# Patient Record
Sex: FEMALE | Race: WHITE | NOT HISPANIC OR LATINO | Employment: FULL TIME | URBAN - METROPOLITAN AREA
[De-identification: names, ages, dates, MRNs, and addresses within clinical notes are randomized per-mention and may not be internally consistent; named-entity substitution may affect disease eponyms.]

---

## 2019-08-12 RX ORDER — ALBUTEROL SULFATE 2.5 MG/3ML
2.5 SOLUTION RESPIRATORY (INHALATION) EVERY 4 HOURS PRN
COMMUNITY
Start: 2018-03-13

## 2019-08-12 RX ORDER — DICYCLOMINE HCL 20 MG
20 TABLET ORAL EVERY 6 HOURS
COMMUNITY
Start: 2019-02-19 | End: 2019-10-07 | Stop reason: SDUPTHER

## 2019-08-12 RX ORDER — LORATADINE 10 MG/1
10 CAPSULE, LIQUID FILLED ORAL
COMMUNITY
End: 2019-11-01

## 2019-08-12 RX ORDER — LACTOBACILLUS RHAMNOSUS GG 10B CELL
1 CAPSULE ORAL DAILY
Status: ON HOLD | COMMUNITY
Start: 2019-07-19 | End: 2019-11-12

## 2019-08-12 RX ORDER — FLUOXETINE 10 MG/1
CAPSULE ORAL
Refills: 0 | COMMUNITY
Start: 2019-06-24 | End: 2019-11-01

## 2019-08-12 RX ORDER — DOCUSATE SODIUM 100 MG/1
100 CAPSULE, LIQUID FILLED ORAL DAILY
Refills: 0 | Status: ON HOLD | COMMUNITY
Start: 2019-07-11 | End: 2019-11-12

## 2019-08-12 RX ORDER — OXYCODONE HYDROCHLORIDE AND ACETAMINOPHEN 5; 325 MG/1; MG/1
1 TABLET ORAL
COMMUNITY
Start: 2019-08-08 | End: 2019-11-01

## 2019-08-15 ENCOUNTER — OFFICE VISIT (OUTPATIENT)
Dept: GASTROENTEROLOGY | Facility: CLINIC | Age: 34
End: 2019-08-15
Payer: COMMERCIAL

## 2019-08-15 VITALS
WEIGHT: 143.8 LBS | HEART RATE: 78 BPM | BODY MASS INDEX: 26.46 KG/M2 | HEIGHT: 62 IN | DIASTOLIC BLOOD PRESSURE: 68 MMHG | SYSTOLIC BLOOD PRESSURE: 116 MMHG

## 2019-08-15 DIAGNOSIS — K64.9 HEMORRHOIDS, UNSPECIFIED HEMORRHOID TYPE: ICD-10-CM

## 2019-08-15 DIAGNOSIS — K58.0 IRRITABLE BOWEL SYNDROME WITH DIARRHEA: Primary | ICD-10-CM

## 2019-08-15 PROCEDURE — 99203 OFFICE O/P NEW LOW 30 MIN: CPT | Performed by: PHYSICIAN ASSISTANT

## 2019-08-15 RX ORDER — FLUOXETINE HYDROCHLORIDE 20 MG/1
20 CAPSULE ORAL DAILY
Refills: 0 | COMMUNITY
Start: 2019-07-25 | End: 2020-10-16

## 2019-08-15 RX ORDER — SERTRALINE HYDROCHLORIDE 100 MG/1
TABLET, FILM COATED ORAL
COMMUNITY
Start: 2019-05-22 | End: 2019-11-01

## 2019-08-15 RX ORDER — IBUPROFEN 600 MG/1
TABLET ORAL
COMMUNITY
Start: 2019-08-01 | End: 2019-11-01

## 2019-08-15 RX ORDER — LIDOCAINE 40 MG/G
CREAM TOPICAL AS NEEDED
Qty: 30 G | Refills: 0 | Status: SHIPPED | OUTPATIENT
Start: 2019-08-15 | End: 2019-11-01

## 2019-08-15 NOTE — PROGRESS NOTES
[facility] - Outpatient Follow-up Note  Glenroy Robertson 29 y o  female MRN: [id number]  Encounter: [id number]          ASSESSMENT AND PLAN:      1  Irritable bowel syndrome with diarrhea  He she has a long history of IBS D  Recently she has suffered with severe hemorrhoids and is status post hemorrhoidectomy although she reports she is having significant pain postoperatively and has been unable to see the surgeon before August 28th  Anal exam shows an external hemorrhoid - start lidocaine and Anusol cream  Cannot use any antidiarrheals as she is status post hemorrhoidectomy but will start a trial of Xifaxan  Donnatal as needed for pain    She will need EGD and a colonoscopy however will have to wait 6 weeks postoperatively    2  Hemorrhoids, unspecified hemorrhoid type  Follow-up with surgeon  ______________________________________________________________________    SUBJECTIVE:  17-year-old female with irritable bowel syndrome complicated by internal and external hemorrhoids presents for evaluation  She reports that she has had bowel troubles for most of her life  She tends towards diarrhea  She has 4-5 loose stools daily associated with abdominal cramping and bloating  There is no rectal bleeding or unexpected weight loss  She has never had a full evaluation for her symptoms in the past   She had a hemorrhoidectomy on August 1st and is reporting significant pain postoperatively  She has tried to contact the office several times and was given an appointment for August 28  REVIEW OF SYSTEMS IS OTHERWISE NEGATIVE        Historical Information   Past Medical History:   Diagnosis Date    Irritable bowel syndrome      Past Surgical History:   Procedure Laterality Date    HEMORRHOID SURGERY       Social History   Social History     Substance and Sexual Activity   Alcohol Use Not Currently     Social History     Substance and Sexual Activity   Drug Use Yes    Types: Marijuana Comment: medical     Social History     Tobacco Use   Smoking Status Former Smoker   Smokeless Tobacco Current User     Family History   Problem Relation Age of Onset    Cancer Mother     Diabetes Father     Heart disease Father        Meds/Allergies       Current Outpatient Medications:     docusate sodium (COLACE) 100 mg capsule    FLUoxetine (PROzac) 10 mg capsule    FLUoxetine (PROzac) 20 mg capsule    ibuprofen (MOTRIN) 600 mg tablet    Lactobacillus-Inulin ([facility]) CAPS    Loratadine (CLARITIN) 10 MG CAPS    PROCTOZONE-HC 2 5 % rectal cream    sertraline (ZOLOFT) 50 mg tablet    albuterol (2 5 mg/3 mL) 0 083 % nebulizer solution    dicyclomine (BENTYL) 20 mg tablet    hydrocortisone 2 5 % cream    lidocaine (LMX) 4 % cream    oxyCODONE-acetaminophen (PERCOCET) 5-325 mg per tablet    rifaximin (XIFAXAN) 550 mg tablet    sertraline (ZOLOFT) 100 mg tablet    Allergies   Allergen Reactions    Peanut-Containing Drug Products      GI distress           Objective     Blood pressure 116/68, pulse 78, height 5' 2" (1 575 m), weight 65 2 kg (143 lb 12 8 oz)  Body mass index is 26 3 kg/m²  PHYSICAL EXAM:      General Appearance:   Alert, cooperative, no distress   HEENT:   Normocephalic, atraumatic, anicteric      Neck:  Supple, symmetrical, trachea midline   Lungs:   Clear to auscultation bilaterally; no rales, rhonchi or wheezing; respirations unlabored    Heart[de-identified]   Regular rate and rhythm; no murmur, rub, or gallop  Abdomen:   Soft, non-tender, non-distended; normal bowel sounds; no masses, no organomegaly    Genitalia:   Deferred    Rectal:   Deferred    Extremities:  No cyanosis, clubbing or edema    Pulses:  2+ and symmetric    Skin:  No jaundice, rashes, or lesions    Lymph nodes:  No palpable cervical lymphadenopathy        Lab Results:   No visits with results within 1 Day(s) from this visit     Latest known visit with results is:   No results found for any previous visit  Radiology Results:   No results found

## 2019-08-16 ENCOUNTER — TELEPHONE (OUTPATIENT)
Dept: GASTROENTEROLOGY | Facility: CLINIC | Age: 34
End: 2019-08-16

## 2019-09-06 ENCOUNTER — OFFICE VISIT (OUTPATIENT)
Dept: GASTROENTEROLOGY | Facility: CLINIC | Age: 34
End: 2019-09-06
Payer: COMMERCIAL

## 2019-09-06 VITALS
HEIGHT: 62 IN | BODY MASS INDEX: 25.69 KG/M2 | DIASTOLIC BLOOD PRESSURE: 82 MMHG | HEART RATE: 80 BPM | WEIGHT: 139.6 LBS | SYSTOLIC BLOOD PRESSURE: 102 MMHG

## 2019-09-06 DIAGNOSIS — A09 DIARRHEA OF INFECTIOUS ORIGIN: Primary | ICD-10-CM

## 2019-09-06 PROCEDURE — 99213 OFFICE O/P EST LOW 20 MIN: CPT | Performed by: PHYSICIAN ASSISTANT

## 2019-09-06 RX ORDER — HYDROXYZINE HYDROCHLORIDE 25 MG/1
25 TABLET, FILM COATED ORAL EVERY 6 HOURS PRN
COMMUNITY
End: 2020-07-20 | Stop reason: ALTCHOICE

## 2019-09-06 RX ORDER — ALOSETRON HYDROCHLORIDE 1 MG/1
1 TABLET, FILM COATED ORAL DAILY
Qty: 30 TABLET | Refills: 3 | Status: SHIPPED | OUTPATIENT
Start: 2019-09-06 | End: 2019-11-01

## 2019-09-06 NOTE — PROGRESS NOTES
[facility] - Outpatient Follow-up Note  Ac Payne 29 y o  female MRN: [id number]  Encounter: [id number]          ASSESSMENT AND PLAN:      1  Diarrhea of infectious origin  No relief with Xifaxan  Her anal pain has improved significantly  She still reports abdominal cramping  Will start Lotronex 0 5mg daily  ______________________________________________________________________    SUBJECTIVE:  59-year-old female with diarrhea predominant irritable bowel syndrome presents for follow-up  Unfortunately, she experienced no relief in her symptoms with the Xifaxan  She is she admits still taking her stool softener  She reports that her anal pain is significantly improved since last visit  She did follow-up with the surgeon who told her that what appeared to be a residual hemorrhoid was just a skin tag  She admits to associated abdominal cramping but denies any bleeding  She has had these symptoms for most of her adult life  REVIEW OF SYSTEMS IS OTHERWISE NEGATIVE        Historical Information   Past Medical History:   Diagnosis Date    Irritable bowel syndrome      Past Surgical History:   Procedure Laterality Date    HEMORRHOID SURGERY       Social History   Social History     Substance and Sexual Activity   Alcohol Use Not Currently     Social History     Substance and Sexual Activity   Drug Use Yes    Types: Marijuana    Comment: medical     Social History     Tobacco Use   Smoking Status Former Smoker   Smokeless Tobacco Current User     Family History   Problem Relation Age of Onset    Cancer Mother     Diabetes Father     Heart disease Father        Meds/Allergies       Current Outpatient Medications:     albuterol (2 5 mg/3 mL) 0 083 % nebulizer solution    docusate sodium (COLACE) 100 mg capsule    FLUoxetine (PROzac) 10 mg capsule    FLUoxetine (PROzac) 20 mg capsule    hydrocortisone 2 5 % cream    hydrOXYzine HCL (ATARAX) 25 mg tablet   ibuprofen (MOTRIN) 600 mg tablet    Lactobacillus-Inulin ([address]) CAPS    lidocaine (LMX) 4 % cream    Loratadine (CLARITIN) 10 MG CAPS    PROCTOZONE-HC 2 5 % rectal cream    sertraline (ZOLOFT) 100 mg tablet    alosetron (LOTRONEX) 1 MG tablet    dicyclomine (BENTYL) 20 mg tablet    oxyCODONE-acetaminophen (PERCOCET) 5-325 mg per tablet    sertraline (ZOLOFT) 50 mg tablet    Allergies   Allergen Reactions    Nuts      GI distress    Peanut-Containing Drug Products      GI distress           Objective     Blood pressure 102/82, pulse 80, height 5' 2" (1 575 m), weight 63 3 kg (139 lb 9 6 oz)  Body mass index is 25 53 kg/m²  PHYSICAL EXAM:      General Appearance:   Alert, cooperative, no distress   HEENT:   Normocephalic, atraumatic, anicteric      Neck:  Supple, symmetrical, trachea midline   Lungs:   Clear to auscultation bilaterally; no rales, rhonchi or wheezing; respirations unlabored    Heart[de-identified]   Regular rate and rhythm; no murmur, rub, or gallop  Abdomen:   Soft, non-tender, non-distended; normal bowel sounds; no masses, no organomegaly    Genitalia:   Deferred    Rectal:   Deferred    Extremities:  No cyanosis, clubbing or edema    Pulses:  2+ and symmetric    Skin:  No jaundice, rashes, or lesions    Lymph nodes:  No palpable cervical lymphadenopathy        Lab Results:   No visits with results within 1 Day(s) from this visit  Latest known visit with results is:   No results found for any previous visit  Radiology Results:   No results found

## 2019-09-10 ENCOUNTER — TELEPHONE (OUTPATIENT)
Dept: GASTROENTEROLOGY | Facility: CLINIC | Age: 34
End: 2019-09-10

## 2019-09-10 NOTE — TELEPHONE ENCOUNTER
Tried on covermymeds and [employer] is not the PA processor for this patient evne though that is the infomration from [facility]

## 2019-09-10 NOTE — TELEPHONE ENCOUNTER
rcvd fax from [address]     Alosetron 1mg tab daily needs prior auth      Will follow up with with prior auth

## 2019-09-12 ENCOUNTER — APPOINTMENT (EMERGENCY)
Dept: RADIOLOGY | Facility: HOSPITAL | Age: 34
End: 2019-09-12
Payer: COMMERCIAL

## 2019-09-12 ENCOUNTER — HOSPITAL ENCOUNTER (EMERGENCY)
Facility: HOSPITAL | Age: 34
Discharge: HOME | End: 2019-09-12
Admitting: EMERGENCY MEDICINE
Payer: COMMERCIAL

## 2019-09-12 VITALS
TEMPERATURE: 98.4 F | RESPIRATION RATE: 12 BRPM | HEART RATE: 100 BPM | OXYGEN SATURATION: 98 % | DIASTOLIC BLOOD PRESSURE: 80 MMHG | WEIGHT: 150.13 LBS | SYSTOLIC BLOOD PRESSURE: 126 MMHG

## 2019-09-12 DIAGNOSIS — M54.2 MUSCULOSKELETAL NECK PAIN: Primary | ICD-10-CM

## 2019-09-12 PROBLEM — S06.0X0A CONCUSSION WITH NO LOSS OF CONSCIOUSNESS: Status: ACTIVE | Noted: 2019-09-12
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
## 2019-09-12 LAB
AMPHETAMINES SERPL QL SCN: NEGATIVE
ATRIAL RATE: 84 BPM
BARBITURATES UR QL: NEGATIVE
BASE EXCESS BLDA CALC-SCNC: -3 MMOL/L (ref -2–3)
BENZODIAZ UR QL: NEGATIVE
CA-I BLD-SCNC: 1.16 MMOL/L (ref 1.12–1.32)
COCAINE UR QL: NEGATIVE
GLUCOSE SERPL-MCNC: 161 MG/DL (ref 65–140)
HCO3 BLDA-SCNC: 20.9 MMOL/L (ref 24–30)
HCT VFR BLD CALC: 37 % (ref 34.8–46.1)
HGB BLDA-MCNC: 12.6 G/DL (ref 11.5–15.4)
METHADONE UR QL: NEGATIVE
OPIATES UR QL SCN: NEGATIVE
P AXIS: 63 DEGREES
PCO2 BLD: 22 MMOL/L (ref 21–32)
PCO2 BLD: 33.4 MM HG (ref 42–50)
PCP UR QL: NEGATIVE
PH BLD: 7.4 [PH] (ref 7.3–7.4)
PO2 BLD: 27 MM HG (ref 35–45)
POTASSIUM BLD-SCNC: 3.5 MMOL/L (ref 3.5–5.3)
PR INTERVAL: 156 MS
QRS AXIS: 54 DEGREES
QRSD INTERVAL: 66 MS
QT INTERVAL: 350 MS
QTC INTERVAL: 413 MS
SAO2 % BLD FROM PO2: 52 % (ref 95–98)
SODIUM BLD-SCNC: 140 MMOL/L (ref 136–145)
SPECIMEN SOURCE: ABNORMAL
T WAVE AXIS: 52 DEGREES
THC UR QL: POSITIVE
VENTRICULAR RATE: 84 BPM

## 2019-09-12 PROCEDURE — 84132 ASSAY OF SERUM POTASSIUM: CPT

## 2019-09-12 PROCEDURE — 82330 ASSAY OF CALCIUM: CPT

## 2019-09-12 PROCEDURE — 71045 X-RAY EXAM CHEST 1 VIEW: CPT

## 2019-09-12 PROCEDURE — 72125 CT NECK SPINE W/O DYE: CPT

## 2019-09-12 PROCEDURE — 80307 DRUG TEST PRSMV CHEM ANLYZR: CPT | Performed by: PHYSICIAN ASSISTANT

## 2019-09-12 PROCEDURE — 85014 HEMATOCRIT: CPT

## 2019-09-12 PROCEDURE — 70450 CT HEAD/BRAIN W/O DYE: CPT

## 2019-09-12 PROCEDURE — 93005 ELECTROCARDIOGRAM TRACING: CPT

## 2019-09-12 PROCEDURE — 99282 EMERGENCY DEPT VISIT SF MDM: CPT | Performed by: SURGERY

## 2019-09-12 PROCEDURE — 93010 ELECTROCARDIOGRAM REPORT: CPT | Performed by: INTERNAL MEDICINE

## 2019-09-12 PROCEDURE — 84295 ASSAY OF SERUM SODIUM: CPT

## 2019-09-12 PROCEDURE — 99285 EMERGENCY DEPT VISIT HI MDM: CPT

## 2019-09-12 PROCEDURE — 82947 ASSAY GLUCOSE BLOOD QUANT: CPT

## 2019-09-12 PROCEDURE — 82803 BLOOD GASES ANY COMBINATION: CPT

## 2019-09-12 PROCEDURE — NC001 PR NO CHARGE: Performed by: EMERGENCY MEDICINE

## 2019-09-12 RX ORDER — GABAPENTIN 100 MG/1
100 CAPSULE ORAL 3 TIMES DAILY
Qty: 30 CAPSULE | Refills: 0 | Status: SHIPPED | OUTPATIENT
Start: 2019-09-12 | End: 2019-11-01

## 2019-09-12 RX ORDER — FLUOXETINE 10 MG/1
10 CAPSULE ORAL DAILY
COMMUNITY
End: 2019-11-01

## 2019-09-12 RX ORDER — METHOCARBAMOL 500 MG/1
500 TABLET, FILM COATED ORAL EVERY 8 HOURS PRN
Qty: 15 TABLET | Refills: 0 | Status: SHIPPED | OUTPATIENT
Start: 2019-09-12 | End: 2020-06-04

## 2019-09-12 RX ORDER — IBUPROFEN 600 MG/1
600 TABLET ORAL ONCE
Status: COMPLETED | OUTPATIENT
Start: 2019-09-12 | End: 2019-09-12

## 2019-09-12 RX ORDER — DIPHENOXYLATE HYDROCHLORIDE AND ATROPINE SULFATE 2.5; .025 MG/1; MG/1
1 TABLET ORAL DAILY
Status: ON HOLD | COMMUNITY
End: 2019-11-12

## 2019-09-12 RX ADMIN — IBUPROFEN 600 MG: 600 TABLET, FILM COATED ORAL at 14:05

## 2019-09-12 NOTE — QUICK NOTE
The patient had a CT scan of the cervical spine demonstrating no acute fractures or traumatic malalignment  On exam, the patient had no midline cervical spine tenderness  The patient had full range of motion (was then able to flex, extend, and rotate head side to side) without pain  There were no distracting injuries and the patient was not intoxicated  The patients cervical collar was cleared radiologically and clinically      Ovidio Moe PA-C  9/12/2019  12:21 PM
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
## 2019-09-12 NOTE — DISCHARGE INSTRUCTIONS
Acute Neck Pain   WHAT YOU NEED TO KNOW:   Acute neck pain starts suddenly, increases quickly, and goes away in a few days  The pain may come and go, or be worse with certain movements  The pain may be only in your neck, or it may move to your arms, back, or shoulders  You may also have pain that starts in another body area and moves to your neck  DISCHARGE INSTRUCTIONS:   Return to the emergency department if:   · You have an injury that causes neck pain and shooting pain down your arms or legs  · Your neck pain suddenly becomes severe  · You have neck pain along with numbness, tingling, or weakness in your arms or legs  · You have a stiff neck, a headache, and a fever  Contact your healthcare provider if:   · You have new or worsening symptoms  · Your symptoms continue even after treatment  · You have questions or concerns about your condition or care  Medicines:   · NSAIDs , such as ibuprofen, help decrease swelling, pain, and fever  This medicine is available without a doctor's order  Ask your healthcare provider which medicine to take and how often to take it  Follow directions  NSAIDs can cause stomach bleeding or kidney problems if not taken correctly  If you take blood thinner medicine, always ask if NSAIDs are safe for you  · Acetaminophen  helps decrease pain and fever  Ask your healthcare provider how much to take and how often to take it  Follow directions  Acetaminophen can cause liver damage if not taken correctly  · Steroid medicine  may be used to reduce inflammation  This can help relieve pain caused by swelling  · Take your medicine as directed  Contact your healthcare provider if you think your medicine is not helping or if you have side effects  Tell him or her if you are allergic to any medicine  Keep a list of the medicines, vitamins, and herbs you take  Include the amounts, and when and why you take them  Bring the list or the pill bottles to follow-up visits  Carry your medicine list with you in case of an emergency  Manage or prevent acute neck pain:   · Rest your neck as directed  Do not make sudden movements, such as turning your head quickly  Your healthcare provider may recommend you wear a cervical collar for a short time  The collar will prevent you from moving your head  This will give your neck time to heal if an injury is causing your neck pain  Ask your healthcare provider when you can return to sports or other normal daily activities  · Apply heat as directed  Heat helps relieve pain and swelling  Use a heat wrap, or soak a small towel in warm water  Wring out the extra water  Apply the heat wrap or towel for 20 minutes every hour, or as directed  · Apply ice as directed  Ice helps relieve pain and swelling, and can help prevent tissue damage  Use an ice pack, or put ice in a bag  Cover the ice pack or back with a towel before you apply it to your neck  Apply the ice pack or ice for 15 minutes every hour, or as directed  Your healthcare provider can tell you how often to apply ice  · Do neck exercises as directed  Neck exercises help strengthen the muscles and increase range of motion  Your healthcare provider will tell you which exercises are right for you  He may give you instructions, or he may recommend that you work with a physical therapist  Your healthcare provider or therapist can make sure you are doing the exercises correctly  · Maintain good posture  Try to keep your head and shoulders lifted when you sit  If you work in front of a computer, make sure the monitor is at the right level  You should not need to look up down to see the screen  You should also not have to lean forward to be able to read what is on the screen  Make sure your keyboard, mouse, and other computer items are placed where you do not have to extend your shoulder to reach them  Get up often if you work in front of a computer or sit for long periods of time  Stretch or walk around to keep your neck muscles loose  Follow up with your healthcare provider as directed: Your healthcare provider may refer you to a specialist if your pain does not get better with treatment  Write down your questions so you remember to ask them during your visits  © 2017 [address] Information is for End User's use only and may not be sold, redistributed or otherwise used for commercial purposes  All illustrations and images included in CareNotes® are the copyrighted property of A D A M , Inc  or [employer]  The above information is an  only  It is not intended as medical advice for individual conditions or treatments  Talk to your doctor, nurse or pharmacist before following any medical regimen to see if it is safe and effective for you
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
## 2019-09-24 ENCOUNTER — TELEPHONE (OUTPATIENT)
Dept: GASTROENTEROLOGY | Facility: CLINIC | Age: 34
End: 2019-09-24

## 2019-09-24 NOTE — TELEPHONE ENCOUNTER
[employer] and and lotronix was approved on 9-6-2019 until 2099  Spoke to Martir Brumfield and pt picked up meds on 9-

## 2019-09-24 NOTE — TELEPHONE ENCOUNTER
rcvd fax from [facility] 99 and auth on file for member and drug and no further review required at this time

## 2019-10-02 ENCOUNTER — PREP FOR PROCEDURE (OUTPATIENT)
Dept: GASTROENTEROLOGY | Facility: CLINIC | Age: 34
End: 2019-10-02

## 2019-10-02 ENCOUNTER — OFFICE VISIT (OUTPATIENT)
Dept: GASTROENTEROLOGY | Facility: CLINIC | Age: 34
End: 2019-10-02
Payer: COMMERCIAL

## 2019-10-02 DIAGNOSIS — R19.7 DIARRHEA, UNSPECIFIED TYPE: Primary | ICD-10-CM

## 2019-10-02 DIAGNOSIS — R10.84 GENERALIZED ABDOMINAL PAIN: ICD-10-CM

## 2019-10-02 PROCEDURE — 99214 OFFICE O/P EST MOD 30 MIN: CPT | Performed by: PHYSICIAN ASSISTANT

## 2019-10-02 NOTE — PATIENT INSTRUCTIONS
Nutrition Tips for Relief of Diarrhea   WHAT YOU NEED TO KNOW:   There are diet changes you can make to help relieve or stop diarrhea  These changes include limiting or avoiding foods and liquids that are high in sugar, fat, fiber, and lactose  Lactose is a sugar found in milk products  Milk products can cause diarrhea in people who are lactose intolerant  You should also drink extra liquids to replace fluids that are lost when you have diarrhea  Diarrhea can lead to dehydration  DISCHARGE INSTRUCTIONS:   Foods to limit or avoid:   · Dairy:      ¨ Whole milk    ¨ Half-and-half, cream, and sour cream    ¨ Regular (whole milk) ice cream    · Grains:      ¨ Whole wheat and whole grain breads, pasta, cereals, and crackers    ¨ Brown and wild rice    ¨ Breads and cereals with seeds or nuts    ¨ Popcorn    · Fruit and vegetables:      ¨ All raw fruits, except bananas and melon    ¨ Dried fruits, including prunes and raisins    ¨ Canned fruit in heavy syrup    ¨ Prune juice and any fruit juice with pulp    ¨ Raw vegetables, except lettuce     ¨ Fried vegetables    ¨ Corn, raw and cooked broccoli, cabbage, cauliflower, and silvia greens    · Protein:      ¨ Fried meat, poultry, and fish    ¨ High-fat luncheon meats, such as bologna    ¨ Fatty meats, such as sausage, kaba, and hot dogs    ¨ Beans and nuts    · Liquids:      ¨ Sodas and fruit-flavored drinks    ¨ Drinks that contain caffeine, such as energy drinks, coffee, and tea     ¨ Drinks that contain alcohol or sugar alcohol, such as sorbitol  Foods and liquids you may eat or drink:  Most people can tolerate the foods and liquids listed below  If any of them make your symptoms worse, stop eating or drinking them until you feel better  If you are lactose intolerant, avoid milk products    · Dairy:      ¨ Skim or low-fat milk or evaporated milk    ¨ Soy milk or buttermilk     ¨ Low-fat, part-skim, and aged cheese    ¨ Yogurt, low-fat ice cream, or sherbert    · Grains: (Choose foods with less than 2 grams of dietary fiber per serving )     ¨ White or refined flour breads, bagels, pasta, and crackers    ¨ Cold or hot cereals made from white or refined flour such as puffed rice, cornflakes, or cream of wheat    ¨ White rice    · Fruit and vegetables:      ¨ Bananas or melon    ¨ Fruit juice without pulp, except prune juice    ¨ Canned fruit in juice or light syrup    ¨ Lettuce and most well-cooked vegetables without seeds or skins     ¨ Strained vegetable juice    · Protein:      ¨ Tender, well-cooked meat, poultry, or fish    ¨ Well-cooked eggs or soy foods (cooked without added fat)    ¨ Smooth nut butters    · Fats:  (Limit fats to less than 8 teaspoons a day)     ¨ Oil, butter, or margarine, or mayonnaise    ¨ Cream cheese or salad dressings    · Liquids:      ¨ For infants, breast milk or formula    ¨ Oral rehydration solution     ¨ Decaffeinated coffee or caffeine-free teas    ¨ Soft drinks without caffeine  Other guidelines to follow:   · Drink liquids as directed  You may need to drink more liquids than usual to prevent dehydration  Ask how much liquid to drink each day and which liquids are best for you  You may need to drink an oral rehydration solution (ORS)  An ORS helps replace fluids and electrolytes that you lose when you have diarrhea  · Eat small meals or snacks every 3 to 4 hours  instead of large meals  Continue eating even if you still have diarrhea  Your diarrhea will continue for a few days but should gradually go away  © 2017 [address] [employer] Information is for End User's use only and may not be sold, redistributed or otherwise used for commercial purposes  All illustrations and images included in CareNotes® are the copyrighted property of [employer] , [employer]  or [employer]  The above information is an  only  It is not intended as medical advice for individual conditions or treatments   Talk to your doctor, nurse or pharmacist before following any medical regimen to see if it is safe and effective for you

## 2019-10-02 NOTE — LETTER
October 2, 2019     Crystal Khanna MD  [address] [facility] [place] [address]    Patient: Keaton Dang   YOB: 1985   Date of Visit: 10/2/2019       Dear Dr Antonina Gan: Thank you for referring Keaton Dang to me for evaluation  Below are my notes for this consultation  If you have questions, please do not hesitate to call me  I look forward to following your patient along with you  Sincerely,        Mattie Shah PA-C        CC: No Recipients  Mattie Shah PA-C  10/2/2019 10:20 AM  Sign at close encounter  [facility] - Outpatient Follow-up Note  Keaton Dang 29 y o  female MRN: [id number]  Encounter: [id number]          ASSESSMENT AND PLAN:      1  Diarrhea, unspecified type  2  Generalized abdominal pain  Will continue current medication regimen  I have asked patient to be taking Bentyl 20 mg t i d  As scheduled  Will do colonoscopy with biopsy to rule out microscopic colitis   ______________________________________________________________________    SUBJECTIVE:    79-year-old female who is here with abdominal pain and diarrhea  Patient reports her last appointment she was started on Lotronex and this seems to be helping her  She does report that sometimes she actually has to take a stool softener now because her stool is so hard  She does report that her rectal bleeding has stopped now that she has had her hemorrhoidal surgery  Patient reports that she has never had a colonoscopy at this time  Patient reports abdominal pain that is every day and is like a pulsating cramp  She reports occasional nausea and vomiting  She reports occasional diaphoresis PT  REVIEW OF SYSTEMS IS OTHERWISE NEGATIVE        Historical Information   Past Medical History:   Diagnosis Date    Irritable bowel syndrome      Past Surgical History:   Procedure Laterality Date    ABDOMINAL SURGERY      HEMORRHOID SURGERY       Social History   Social History Substance and Sexual Activity   Alcohol Use Not Currently     Social History     Substance and Sexual Activity   Drug Use Yes    Types: Marijuana    Comment: medical     Social History     Tobacco Use   Smoking Status Never Smoker   Smokeless Tobacco Never Used     Family History   Problem Relation Age of Onset    Cancer Mother     Diabetes Father     Heart disease Father        Meds/Allergies       Current Outpatient Medications:     albuterol (2 5 mg/3 mL) 0 083 % nebulizer solution    alosetron (LOTRONEX) 1 MG tablet    ARIPiprazole (ABILIFY) 5 mg tablet    dicyclomine (BENTYL) 20 mg tablet    docusate sodium (COLACE) 100 mg capsule    FLUoxetine (PROzac) 10 mg capsule    FLUoxetine (PROzac) 10 mg capsule    FLUoxetine (PROzac) 20 mg capsule    gabapentin (NEURONTIN) 100 mg capsule    hydrocortisone 2 5 % cream    hydrOXYzine HCL (ATARAX) 25 mg tablet    ibuprofen (MOTRIN) 600 mg tablet    Lactobacillus-Inulin ([facility] DIGESTIVE HEALTH) CAPS    lidocaine (LMX) 4 % cream    Loratadine (CLARITIN) 10 MG CAPS    methocarbamol (ROBAXIN) 500 mg tablet    multivitamin (THERAGRAN) TABS    oxyCODONE-acetaminophen (PERCOCET) 5-325 mg per tablet    PROCTOZONE-HC 2 5 % rectal cream    sertraline (ZOLOFT) 100 mg tablet    sertraline (ZOLOFT) 50 mg tablet    Allergies   Allergen Reactions    Nuts      GI distress    Peanut-Containing Drug Products      GI distress    Peanut-Containing Drug Products            Objective     There were no vitals taken for this visit  There is no height or weight on file to calculate BMI  PHYSICAL EXAM:      General Appearance:   Alert, cooperative, no distress   HEENT:   Normocephalic, atraumatic, anicteric      Neck:  Supple, symmetrical, trachea midline   Lungs:   Clear to auscultation bilaterally; no rales, rhonchi or wheezing; respirations unlabored    Heart[de-identified]   Regular rate and rhythm; no murmur, rub, or gallop     Abdomen:   Soft, non-tender, non-distended; normal bowel sounds; no masses, no organomegaly    Genitalia:   Deferred    Rectal:   Deferred    Extremities:  No cyanosis, clubbing or edema    Pulses:  2+ and symmetric    Skin:  No jaundice, rashes, or lesions    Lymph nodes:  No palpable cervical lymphadenopathy        Lab Results:   No visits with results within 1 Day(s) from this visit  Latest known visit with results is:   Admission on 09/12/2019, Discharged on 09/12/2019   Component Date Value    Amph/Meth UR 09/12/2019 Negative     Barbiturate Ur 09/12/2019 Negative     Benzodiazepine Urine 09/12/2019 Negative     Cocaine Urine 09/12/2019 Negative     Methadone Urine 09/12/2019 Negative     Opiate Urine 09/12/2019 Negative     PCP Ur 09/12/2019 Negative     THC Urine 09/12/2019 Positive*    ph, Wilson ISTAT 09/12/2019 7 404*    pCO2, Wilson i-STAT 09/12/2019 33 4*    pO2, Wilson i-STAT 09/12/2019 27 0*    BE, i-STAT 09/12/2019 -3*    HCO3, Wilson i-STAT 09/12/2019 20 9*    CO2, i-STAT 09/12/2019 22     O2 Sat, i-STAT 09/12/2019 52*    SODIUM, I-STAT 09/12/2019 140     Potassium, i-STAT 09/12/2019 3 5     Calcium, Ionized i-STAT 09/12/2019 1 16     Hct, i-STAT 09/12/2019 37     Hgb, i-STAT 09/12/2019 12 6     Glucose, i-STAT 09/12/2019 161*    Specimen Type 09/12/2019 VENOUS     Ventricular Rate 09/12/2019 84     Atrial Rate 09/12/2019 84     MO Interval 09/12/2019 156     QRSD Interval 09/12/2019 66     QT Interval 09/12/2019 350     QTC Interval 09/12/2019 413     P Axis 09/12/2019 63     QRS Axis 09/12/2019 54     T Wave Axis 09/12/2019 52          Radiology Results:   Ct Head Wo Contrast    Result Date: 9/12/2019  Narrative: CT BRAIN - WITHOUT CONTRAST INDICATION:   Head trauma, headache  COMPARISON:  None  TECHNIQUE:  CT examination of the brain was performed  In addition to axial images, coronal 2D reformatted images were created and submitted for interpretation    Radiation dose length product (DLP) for this visit: 966 93 mGy-cm   This examination, like all CT scans performed in the [facility], was performed utilizing techniques to minimize radiation dose exposure, including the use of iterative  reconstruction and automated exposure control  IMAGE QUALITY:  Diagnostic  FINDINGS: PARENCHYMA:  No intracranial mass, mass effect or midline shift  No CT signs of acute infarction  No acute parenchymal hemorrhage  VENTRICLES AND EXTRA-AXIAL SPACES:  Normal for the patient's age  VISUALIZED ORBITS AND PARANASAL SINUSES:  Unremarkable  CALVARIUM AND EXTRACRANIAL SOFT TISSUES:  Normal      Impression: No acute intracranial abnormality  I personally discussed this study with Christian Raza on 9/12/2019 at 11:18 AM  Workstation performed: [id number]     Ct Spine Cervical Wo Contrast    Result Date: 9/12/2019  Narrative: CT CERVICAL SPINE - WITHOUT CONTRAST INDICATION:   Polytrauma, critical, head/C-spine injury suspected  COMPARISON:  None  TECHNIQUE:  CT examination of the cervical spine was performed without intravenous contrast   Contiguous axial images were obtained  Sagittal and coronal reconstructions were performed  Radiation dose length product (DLP) for this visit:  289 79 mGy-cm   This examination, like all CT scans performed in the [facility], was performed utilizing techniques to minimize radiation dose exposure, including the use of iterative  reconstruction and automated exposure control  IMAGE QUALITY:  Diagnostic  FINDINGS: ALIGNMENT:  Normal alignment of the cervical spine  No subluxation  VERTEBRAL BODIES:  No fracture  DEGENERATIVE CHANGES:  No significant cervical degenerative changes are noted  PREVERTEBRAL AND PARASPINAL SOFT TISSUES:  Unremarkable  THORACIC INLET:  Normal      Impression: No cervical spine fracture or traumatic malalignment  Workstation performed: [id number]     Xr Trauma Multiple    Result Date: 9/12/2019  Narrative: CHEST INDICATION: Injury   COMPARISON: None VIEWS:  AP supine portable FINDINGS: Monitoring leads and clips project over the chest  The cardiomediastinal silhouette is within normal limits for technique and patient positioning  Lungs are clear  No pleural effusion  No pneumothorax is seen on this supine film  Upright imaging is more sensitive to detect anterior pneumothorax if relevant  No displaced fractures are evident  Impression: No acute cardiopulmonary disease within limitations of supine imaging    Workstation performed: [id number]

## 2019-10-02 NOTE — PROGRESS NOTES
[facility] - Outpatient Follow-up Note  Jeff Nails 29 y o  female MRN: [id number]  Encounter: [id number]          ASSESSMENT AND PLAN:      1  Diarrhea, unspecified type  2  Generalized abdominal pain  Will continue current medication regimen  I have asked patient to be taking Bentyl 20 mg t i d  As scheduled  Will do colonoscopy with biopsy to rule out microscopic colitis   ______________________________________________________________________    SUBJECTIVE:    49-year-old female who is here with abdominal pain and diarrhea  Patient reports her last appointment she was started on Lotronex and this seems to be helping her  She does report that sometimes she actually has to take a stool softener now because her stool is so hard  She does report that her rectal bleeding has stopped now that she has had her hemorrhoidal surgery  Patient reports that she has never had a colonoscopy at this time  Patient reports abdominal pain that is every day and is like a pulsating cramp  She reports occasional nausea and vomiting  She reports occasional diaphoresis PT  REVIEW OF SYSTEMS IS OTHERWISE NEGATIVE        Historical Information   Past Medical History:   Diagnosis Date    Irritable bowel syndrome      Past Surgical History:   Procedure Laterality Date    ABDOMINAL SURGERY      HEMORRHOID SURGERY       Social History   Social History     Substance and Sexual Activity   Alcohol Use Not Currently     Social History     Substance and Sexual Activity   Drug Use Yes    Types: Marijuana    Comment: medical     Social History     Tobacco Use   Smoking Status Never Smoker   Smokeless Tobacco Never Used     Family History   Problem Relation Age of Onset    Cancer Mother     Diabetes Father     Heart disease Father        Meds/Allergies       Current Outpatient Medications:     albuterol (2 5 mg/3 mL) 0 083 % nebulizer solution    alosetron (LOTRONEX) 1 MG tablet    ARIPiprazole (ABILIFY) 5 mg tablet    dicyclomine (BENTYL) 20 mg tablet    docusate sodium (COLACE) 100 mg capsule    FLUoxetine (PROzac) 10 mg capsule    FLUoxetine (PROzac) 10 mg capsule    FLUoxetine (PROzac) 20 mg capsule    gabapentin (NEURONTIN) 100 mg capsule    hydrocortisone 2 5 % cream    hydrOXYzine HCL (ATARAX) 25 mg tablet    ibuprofen (MOTRIN) 600 mg tablet    Lactobacillus-Inulin ([facility] DIGESTIVE HEALTH) CAPS    lidocaine (LMX) 4 % cream    Loratadine (CLARITIN) 10 MG CAPS    methocarbamol (ROBAXIN) 500 mg tablet    multivitamin (THERAGRAN) TABS    oxyCODONE-acetaminophen (PERCOCET) 5-325 mg per tablet    PROCTOZONE-HC 2 5 % rectal cream    sertraline (ZOLOFT) 100 mg tablet    sertraline (ZOLOFT) 50 mg tablet    Allergies   Allergen Reactions    Nuts      GI distress    Peanut-Containing Drug Products      GI distress    Peanut-Containing Drug Products            Objective     There were no vitals taken for this visit  There is no height or weight on file to calculate BMI  PHYSICAL EXAM:      General Appearance:   Alert, cooperative, no distress   HEENT:   Normocephalic, atraumatic, anicteric      Neck:  Supple, symmetrical, trachea midline   Lungs:   Clear to auscultation bilaterally; no rales, rhonchi or wheezing; respirations unlabored    Heart[de-identified]   Regular rate and rhythm; no murmur, rub, or gallop  Abdomen:   Soft, non-tender, non-distended; normal bowel sounds; no masses, no organomegaly    Genitalia:   Deferred    Rectal:   Deferred    Extremities:  No cyanosis, clubbing or edema    Pulses:  2+ and symmetric    Skin:  No jaundice, rashes, or lesions    Lymph nodes:  No palpable cervical lymphadenopathy        Lab Results:   No visits with results within 1 Day(s) from this visit     Latest known visit with results is:   Admission on 09/12/2019, Discharged on 09/12/2019   Component Date Value    Amph/Meth UR 09/12/2019 Negative     Barbiturate Ur 09/12/2019 Negative     Benzodiazepine Urine 09/12/2019 Negative     Cocaine Urine 09/12/2019 Negative     Methadone Urine 09/12/2019 Negative     Opiate Urine 09/12/2019 Negative     PCP Ur 09/12/2019 Negative     THC Urine 09/12/2019 Positive*    ph, Wilson ISTAT 09/12/2019 7 404*    pCO2, Wilson i-STAT 09/12/2019 33 4*    pO2, Wilson i-STAT 09/12/2019 27 0*    BE, i-STAT 09/12/2019 -3*    HCO3, Wilson i-STAT 09/12/2019 20 9*    CO2, i-STAT 09/12/2019 22     O2 Sat, i-STAT 09/12/2019 52*    SODIUM, I-STAT 09/12/2019 140     Potassium, i-STAT 09/12/2019 3 5     Calcium, Ionized i-STAT 09/12/2019 1 16     Hct, i-STAT 09/12/2019 37     Hgb, i-STAT 09/12/2019 12 6     Glucose, i-STAT 09/12/2019 161*    Specimen Type 09/12/2019 VENOUS     Ventricular Rate 09/12/2019 84     Atrial Rate 09/12/2019 84     [place] Interval 09/12/2019 156     QRSD Interval 09/12/2019 66     QT Interval 09/12/2019 350     QTC Interval 09/12/2019 413     P Axis 09/12/2019 63     QRS Axis 09/12/2019 54     T Wave Axis 09/12/2019 52          Radiology Results:   Ct Head Wo Contrast    Result Date: 9/12/2019  Narrative: CT BRAIN - WITHOUT CONTRAST INDICATION:   Head trauma, headache  COMPARISON:  None  TECHNIQUE:  CT examination of the brain was performed  In addition to axial images, coronal 2D reformatted images were created and submitted for interpretation  Radiation dose length product (DLP) for this visit:  966 93 mGy-cm   This examination, like all CT scans performed in the [facility], was performed utilizing techniques to minimize radiation dose exposure, including the use of iterative  reconstruction and automated exposure control  IMAGE QUALITY:  Diagnostic  FINDINGS: PARENCHYMA:  No intracranial mass, mass effect or midline shift  No CT signs of acute infarction  No acute parenchymal hemorrhage  VENTRICLES AND EXTRA-AXIAL SPACES:  Normal for the patient's age  VISUALIZED ORBITS AND PARANASAL SINUSES:  Unremarkable   CALVARIUM AND EXTRACRANIAL SOFT TISSUES:  Normal      Impression: No acute intracranial abnormality  I personally discussed this study with Román Lucio on 9/12/2019 at 11:18 AM  Workstation performed: [id number]     Ct Spine Cervical Wo Contrast    Result Date: 9/12/2019  Narrative: CT CERVICAL SPINE - WITHOUT CONTRAST INDICATION:   Polytrauma, critical, head/C-spine injury suspected  COMPARISON:  None  TECHNIQUE:  CT examination of the cervical spine was performed without intravenous contrast   Contiguous axial images were obtained  Sagittal and coronal reconstructions were performed  Radiation dose length product (DLP) for this visit:  289 79 mGy-cm   This examination, like all CT scans performed in the [facility], was performed utilizing techniques to minimize radiation dose exposure, including the use of iterative  reconstruction and automated exposure control  IMAGE QUALITY:  Diagnostic  FINDINGS: ALIGNMENT:  Normal alignment of the cervical spine  No subluxation  VERTEBRAL BODIES:  No fracture  DEGENERATIVE CHANGES:  No significant cervical degenerative changes are noted  PREVERTEBRAL AND PARASPINAL SOFT TISSUES:  Unremarkable  THORACIC INLET:  Normal      Impression: No cervical spine fracture or traumatic malalignment  Workstation performed: [id number]     Xr Trauma Multiple    Result Date: 9/12/2019  Narrative: CHEST INDICATION: Injury  COMPARISON: None VIEWS:  AP supine portable FINDINGS: Monitoring leads and clips project over the chest  The cardiomediastinal silhouette is within normal limits for technique and patient positioning  Lungs are clear  No pleural effusion  No pneumothorax is seen on this supine film  Upright imaging is more sensitive to detect anterior pneumothorax if relevant  No displaced fractures are evident  Impression: No acute cardiopulmonary disease within limitations of supine imaging    Workstation performed: [id number]

## 2019-10-04 ENCOUNTER — TREATMENT (OUTPATIENT)
Dept: GASTROENTEROLOGY | Facility: CLINIC | Age: 34
End: 2019-10-04

## 2019-10-04 ENCOUNTER — HOSPITAL ENCOUNTER (OUTPATIENT)
Dept: GASTROENTEROLOGY | Facility: HOSPITAL | Age: 34
Setting detail: OUTPATIENT SURGERY
Discharge: HOME | End: 2019-10-04
Attending: INTERNAL MEDICINE
Payer: COMMERCIAL

## 2019-10-04 ENCOUNTER — ANESTHESIA (OUTPATIENT)
Dept: GASTROENTEROLOGY | Facility: HOSPITAL | Age: 34
End: 2019-10-04

## 2019-10-04 ENCOUNTER — ANESTHESIA EVENT (OUTPATIENT)
Dept: GASTROENTEROLOGY | Facility: HOSPITAL | Age: 34
End: 2019-10-04

## 2019-10-04 ENCOUNTER — APPOINTMENT (OUTPATIENT)
Dept: LAB | Facility: HOSPITAL | Age: 34
End: 2019-10-04
Attending: INTERNAL MEDICINE
Payer: COMMERCIAL

## 2019-10-04 VITALS
RESPIRATION RATE: 18 BRPM | HEART RATE: 93 BPM | HEIGHT: 62 IN | OXYGEN SATURATION: 97 % | BODY MASS INDEX: 24.83 KG/M2 | SYSTOLIC BLOOD PRESSURE: 109 MMHG | TEMPERATURE: 97.5 F | WEIGHT: 134.92 LBS | DIASTOLIC BLOOD PRESSURE: 69 MMHG

## 2019-10-04 DIAGNOSIS — C18.3 MALIGNANT NEOPLASM OF HEPATIC FLEXURE (HCC): Primary | ICD-10-CM

## 2019-10-04 DIAGNOSIS — R19.7 DIARRHEA, UNSPECIFIED TYPE: ICD-10-CM

## 2019-10-04 DIAGNOSIS — C18.3 MALIGNANT NEOPLASM OF HEPATIC FLEXURE (HCC): ICD-10-CM

## 2019-10-04 LAB
CEA SERPL-MCNC: 3.4 NG/ML (ref 0–3)
EXT PREGNANCY TEST URINE: NEGATIVE
EXT. CONTROL: NORMAL

## 2019-10-04 PROCEDURE — 88305 TISSUE EXAM BY PATHOLOGIST: CPT | Performed by: PATHOLOGY

## 2019-10-04 PROCEDURE — 82378 CARCINOEMBRYONIC ANTIGEN: CPT

## 2019-10-04 PROCEDURE — 81025 URINE PREGNANCY TEST: CPT | Performed by: STUDENT IN AN ORGANIZED HEALTH CARE EDUCATION/TRAINING PROGRAM

## 2019-10-04 PROCEDURE — 45380 COLONOSCOPY AND BIOPSY: CPT | Performed by: INTERNAL MEDICINE

## 2019-10-04 PROCEDURE — 36415 COLL VENOUS BLD VENIPUNCTURE: CPT

## 2019-10-04 PROCEDURE — 45381 COLONOSCOPY SUBMUCOUS NJX: CPT | Performed by: INTERNAL MEDICINE

## 2019-10-04 RX ORDER — SODIUM CHLORIDE, SODIUM LACTATE, POTASSIUM CHLORIDE, CALCIUM CHLORIDE 600; 310; 30; 20 MG/100ML; MG/100ML; MG/100ML; MG/100ML
125 INJECTION, SOLUTION INTRAVENOUS CONTINUOUS
Status: DISCONTINUED | OUTPATIENT
Start: 2019-10-04 | End: 2019-10-08 | Stop reason: HOSPADM

## 2019-10-04 RX ORDER — LIDOCAINE HYDROCHLORIDE 10 MG/ML
INJECTION, SOLUTION EPIDURAL; INFILTRATION; INTRACAUDAL; PERINEURAL AS NEEDED
Status: DISCONTINUED | OUTPATIENT
Start: 2019-10-04 | End: 2019-10-04 | Stop reason: SURG

## 2019-10-04 RX ORDER — PROPOFOL 10 MG/ML
INJECTION, EMULSION INTRAVENOUS AS NEEDED
Status: DISCONTINUED | OUTPATIENT
Start: 2019-10-04 | End: 2019-10-04 | Stop reason: SURG

## 2019-10-04 RX ADMIN — PROPOFOL 80 MG: 10 INJECTION, EMULSION INTRAVENOUS at 13:31

## 2019-10-04 RX ADMIN — PROPOFOL 170 MG: 10 INJECTION, EMULSION INTRAVENOUS at 13:28

## 2019-10-04 RX ADMIN — PROPOFOL 100 MG: 10 INJECTION, EMULSION INTRAVENOUS at 13:37

## 2019-10-04 RX ADMIN — PROPOFOL 50 MG: 10 INJECTION, EMULSION INTRAVENOUS at 13:33

## 2019-10-04 RX ADMIN — PROPOFOL 50 MG: 10 INJECTION, EMULSION INTRAVENOUS at 13:35

## 2019-10-04 RX ADMIN — PROPOFOL 50 MG: 10 INJECTION, EMULSION INTRAVENOUS at 13:40

## 2019-10-04 RX ADMIN — SODIUM CHLORIDE, SODIUM LACTATE, POTASSIUM CHLORIDE, AND CALCIUM CHLORIDE: .6; .31; .03; .02 INJECTION, SOLUTION INTRAVENOUS at 13:15

## 2019-10-04 RX ADMIN — LIDOCAINE HYDROCHLORIDE 50 MG: 10 INJECTION, SOLUTION EPIDURAL; INFILTRATION; INTRACAUDAL; PERINEURAL at 13:26

## 2019-10-04 RX ADMIN — PROPOFOL 50 MG: 10 INJECTION, EMULSION INTRAVENOUS at 13:39

## 2019-10-04 NOTE — ANESTHESIA PREPROCEDURE EVALUATION
Review of Systems/Medical History  Patient summary reviewed  Chart reviewed  No history of anesthetic complications     Cardiovascular  Exercise tolerance (METS): >4,     Pulmonary       GI/Hepatic            Endo/Other     GYN       Hematology   Musculoskeletal       Neurology   Psychology   Anxiety, Depression , being treated for depression,              Physical Exam    Airway    Mallampati score: II  TM Distance: >3 FB  Neck ROM: full     Dental   No notable dental hx     Cardiovascular      Pulmonary      Other Findings        Anesthesia Plan  ASA Score- 2     Anesthesia Type- IV sedation with anesthesia with ASA Monitors  Additional Monitors:   Airway Plan:         Plan Factors-    Induction- intravenous  Postoperative Plan-     Informed Consent- Anesthetic plan and risks discussed with patient  I personally reviewed this patient with the CRNA  Discussed and agreed on the Anesthesia Plan with the CRNA  Jaun Gonzalez

## 2019-10-04 NOTE — DISCHARGE INSTRUCTIONS
Colonoscopy   WHAT YOU NEED TO KNOW:   A colonoscopy is a procedure to examine the inside of your colon (intestine) with a scope  Polyps or tissue growths may have been removed during your colonoscopy  It is normal to feel bloated and to have some abdominal discomfort  You should be passing gas  If you have hemorrhoids or you had polyps removed, you may have a small amount of bleeding  DISCHARGE INSTRUCTIONS:   Seek care immediately if:   · You have a large amount of bright red blood in your bowel movements  · Your abdomen is hard and firm and you have severe pain  · You have sudden trouble breathing  Contact your healthcare provider if:   · You develop a rash or hives  · You have a fever within 24 hours of your procedure  · You have not had a bowel movement for 3 days after your procedure  · You have questions or concerns about your condition or care  Activity:   · Do not lift, strain, or run  for 3 days after your procedure  · Rest after your procedure  You have been given medicine to relax you  Do not  drive or make important decisions until the day after your procedure  Return to your normal activity as directed  · Relieve gas and discomfort from bloating  by lying on your right side with a heating pad on your abdomen  You may need to take short walks to help the gas move out  Eat small meals until bloating is relieved  If you had polyps removed: For 7 days after your procedure:  · Do not  take aspirin  · Do not  go on long car rides  Help prevent constipation:   · Eat a variety of healthy foods  Healthy foods include fruit, vegetables, whole-grain breads, low-fat dairy products, beans, lean meat, and fish  Ask if you need to be on a special diet  Your healthcare provider may recommend that you eat high-fiber foods such as cooked beans  Fiber helps you have regular bowel movements  · Drink liquids as directed    Adults should drink between 9 and 13 eight-ounce cups of liquid every day  Ask what amount is best for you  For most people, good liquids to drink are water, juice, and milk  · Exercise as directed  Talk to your healthcare provider about the best exercise plan for you  Exercise can help prevent constipation, decrease your blood pressure and improve your health  Follow up with your healthcare provider as directed:  Write down your questions so you remember to ask them during your visits  © 2017 [employer] Information is for End User's use only and may not be sold, redistributed or otherwise used for commercial purposes  All illustrations and images included in CareNotes® are the copyrighted property of [employer]  or [employer]  The above information is an  only  It is not intended as medical advice for individual conditions or treatments  Talk to your doctor, nurse or pharmacist before following any medical regimen to see if it is safe and effective for you

## 2019-10-04 NOTE — H&P
History and Physical -  [facility]  Katy Betancur 29 y o  female MRN: [id number]      HPI: Katy Betancur is a 29y o  year old female who presents for generalized abdominal pain, chronic diarrhea      REVIEW OF SYSTEMS: Per the HPI, and otherwise unremarkable  Historical Information   Past Medical History:   Diagnosis Date    Irritable bowel syndrome      Past Surgical History:   Procedure Laterality Date    ABDOMINAL SURGERY      HEMORRHOID SURGERY       Social History   Social History     Substance and Sexual Activity   Alcohol Use Not Currently     Social History     Substance and Sexual Activity   Drug Use Yes    Types: Marijuana    Comment: medical     Social History     Tobacco Use   Smoking Status Never Smoker   Smokeless Tobacco Never Used     Family History   Problem Relation Age of Onset    Cancer Mother     Diabetes Father     Heart disease Father        Meds/Allergies       (Not in a hospital admission)    Allergies   Allergen Reactions    Nuts      GI distress    Peanut-Containing Drug Products      GI distress    Peanut-Containing Drug Products        Objective     There were no vitals taken for this visit  PHYSICAL EXAM    Gen: NAD  CV: RRR  CHEST: Clear  ABD: soft, NT/ND  EXT: no edema      ASSESSMENT/PLAN:  This is a 29y o  year old female here for colonoscopy with biopsies, and she is stable and optimized for her procedure

## 2019-10-07 DIAGNOSIS — R10.9 ABDOMINAL PAIN, UNSPECIFIED ABDOMINAL LOCATION: Primary | ICD-10-CM

## 2019-10-07 RX ORDER — DICYCLOMINE HCL 20 MG
20 TABLET ORAL EVERY 6 HOURS
Qty: 360 TABLET | Refills: 3 | Status: ON HOLD | OUTPATIENT
Start: 2019-10-07 | End: 2019-11-12

## 2019-10-07 NOTE — TELEPHONE ENCOUNTER
Dr Quin Guidry - Patient called lmom - refill of dicyclomine 20 mg tablet   1 Tablet every 6 hours   Please call [facility] at [phone] ty

## 2019-10-10 ENCOUNTER — TELEPHONE (OUTPATIENT)
Dept: GASTROENTEROLOGY | Facility: CLINIC | Age: 34
End: 2019-10-10

## 2019-10-10 DIAGNOSIS — C18.3 MALIGNANT NEOPLASM OF HEPATIC FLEXURE (HCC): Primary | ICD-10-CM

## 2019-10-10 NOTE — TELEPHONE ENCOUNTER
Called [facility] (Tawana Alonzo) and spoke to Scott she will speak to the doctor to find out when he can see her, usually in cases like this they will see pt within 1 week  Scott will contact pt tomorrow  Called pt and informed her of the above and provider her with Tawana Alonzo contacts info

## 2019-10-10 NOTE — TELEPHONE ENCOUNTER
----- Message from Paolo Vivas PA-C sent at 10/10/2019  2:05 PM EDT -----  Results given  Please have patient see Colorectal Surgery ASAP Thanks,  I will put referral in system

## 2019-10-10 NOTE — TELEPHONE ENCOUNTER
[facility] pt - Pt called to see what the next step is with her treatment, please call [phone]   Ty

## 2019-10-10 NOTE — TELEPHONE ENCOUNTER
----- Message from Emiliano Cannon PA-C sent at 10/10/2019  2:05 PM EDT -----  Results given  Please have patient see Colorectal Surgery ASAP Thanks,  I will put referral in system

## 2019-10-11 NOTE — TELEPHONE ENCOUNTER
Called pt and advised  Marisel Cordoba had already called and pt just had a call 20 min ago for appt with colorectal surgery

## 2019-10-16 PROBLEM — C18.3 CANCER OF HEPATIC FLEXURE (HCC): Status: ACTIVE | Noted: 2019-10-16

## 2019-10-22 ENCOUNTER — HOSPITAL ENCOUNTER (OUTPATIENT)
Dept: CT IMAGING | Facility: CLINIC | Age: 34
Discharge: HOME | End: 2019-10-22
Payer: COMMERCIAL

## 2019-10-22 DIAGNOSIS — C18.3 CANCER OF HEPATIC FLEXURE (HCC): ICD-10-CM

## 2019-10-22 PROCEDURE — 74177 CT ABD & PELVIS W/CONTRAST: CPT

## 2019-10-22 PROCEDURE — 71260 CT THORAX DX C+: CPT

## 2019-10-22 RX ADMIN — IOHEXOL 100 ML: 350 INJECTION, SOLUTION INTRAVENOUS at 12:47

## 2019-10-23 ENCOUNTER — TELEPHONE (OUTPATIENT)
Dept: SURGICAL ONCOLOGY | Facility: CLINIC | Age: 34
End: 2019-10-23

## 2019-10-23 NOTE — TELEPHONE ENCOUNTER
New Patient Encounter    New Patient Intake Form   Patient Details:  Lawanda Garcia  1985  [id number]    Background Information:  [address] starts by opening a telephone encounter and gathering the following information   Who is calling to schedule? If not self, relationship to patient? 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 10/2019   Is patient aware of diagnosis? Yes   Reason for visit? NP DX   Have you had any testing done? If so: when, where? Yes   Are records in [facility]? yes   Was the patient told to bring a disk? no   Scheduling Information:   Preferred [place]:  [place]     Requesting Specific Provider? ali   Are there any dates/time the patient cannot be seen? no   Counseling Pre-Screen:  If the patient answers YES to any of the below questions, please route to the appropriate location specific counselor    Have you felt anxious or worried about cancer and the treatment you are receiving? Did Not Speak to Patient   Has your diagnosis caused physical, emotional, or financial hardship for you? Did Not Speak to Patient   Note: Do not ask the patient about transportation issues/needs  Please notate if the patient brings it up and the counselor will schedule accordingly  Miscellaneous: n/a   After completing the above information, please route to Financial Counselor and the appropriate Nurse Navigator for review

## 2019-11-01 ENCOUNTER — APPOINTMENT (OUTPATIENT)
Dept: LAB | Facility: HOSPITAL | Age: 34
DRG: 231 | End: 2019-11-01
Attending: COLON & RECTAL SURGERY
Payer: COMMERCIAL

## 2019-11-01 ENCOUNTER — CONSULT (OUTPATIENT)
Dept: GYNECOLOGIC ONCOLOGY | Facility: CLINIC | Age: 34
End: 2019-11-01
Payer: COMMERCIAL

## 2019-11-01 DIAGNOSIS — C18.3 CANCER OF HEPATIC FLEXURE (HCC): ICD-10-CM

## 2019-11-01 DIAGNOSIS — Z13.79 VISIT FOR GENETIC SCREENING: ICD-10-CM

## 2019-11-01 DIAGNOSIS — Z85.038 PERSONAL HISTORY OF COLON CANCER: Primary | ICD-10-CM

## 2019-11-01 LAB
ABO GROUP BLD: NORMAL
ALBUMIN SERPL BCP-MCNC: 4 G/DL (ref 3.5–5)
ALP SERPL-CCNC: 69 U/L (ref 46–116)
ALT SERPL W P-5'-P-CCNC: 15 U/L (ref 12–78)
ANION GAP SERPL CALCULATED.3IONS-SCNC: 8 MMOL/L (ref 4–13)
AST SERPL W P-5'-P-CCNC: 10 U/L (ref 5–45)
BASOPHILS # BLD AUTO: 0.07 THOUSANDS/ΜL (ref 0–0.1)
BASOPHILS NFR BLD AUTO: 1 % (ref 0–1)
BILIRUB SERPL-MCNC: 0.14 MG/DL (ref 0.2–1)
BLD GP AB SCN SERPL QL: NEGATIVE
BUN SERPL-MCNC: 5 MG/DL (ref 5–25)
CALCIUM SERPL-MCNC: 9.2 MG/DL (ref 8.3–10.1)
CHLORIDE SERPL-SCNC: 109 MMOL/L (ref 100–108)
CO2 SERPL-SCNC: 25 MMOL/L (ref 21–32)
CREAT SERPL-MCNC: 0.67 MG/DL (ref 0.6–1.3)
EOSINOPHIL # BLD AUTO: 0.25 THOUSAND/ΜL (ref 0–0.61)
EOSINOPHIL NFR BLD AUTO: 3 % (ref 0–6)
ERYTHROCYTE [DISTWIDTH] IN BLOOD BY AUTOMATED COUNT: 13.6 % (ref 11.6–15.1)
EST. AVERAGE GLUCOSE BLD GHB EST-MCNC: 97 MG/DL
GFR SERPL CREATININE-BSD FRML MDRD: 115 ML/MIN/1.73SQ M
GLUCOSE SERPL-MCNC: 92 MG/DL (ref 65–140)
HBA1C MFR BLD: 5 % (ref 4.2–6.3)
HCT VFR BLD AUTO: 36.7 % (ref 34.8–46.1)
HGB BLD-MCNC: 11.8 G/DL (ref 11.5–15.4)
IMM GRANULOCYTES # BLD AUTO: 0.02 THOUSAND/UL (ref 0–0.2)
IMM GRANULOCYTES NFR BLD AUTO: 0 % (ref 0–2)
LYMPHOCYTES # BLD AUTO: 1.7 THOUSANDS/ΜL (ref 0.6–4.47)
LYMPHOCYTES NFR BLD AUTO: 21 % (ref 14–44)
MCH RBC QN AUTO: 28.7 PG (ref 26.8–34.3)
MCHC RBC AUTO-ENTMCNC: 32.2 G/DL (ref 31.4–37.4)
MCV RBC AUTO: 89 FL (ref 82–98)
MONOCYTES # BLD AUTO: 0.43 THOUSAND/ΜL (ref 0.17–1.22)
MONOCYTES NFR BLD AUTO: 5 % (ref 4–12)
NEUTROPHILS # BLD AUTO: 5.47 THOUSANDS/ΜL (ref 1.85–7.62)
NEUTS SEG NFR BLD AUTO: 70 % (ref 43–75)
NRBC BLD AUTO-RTO: 0 /100 WBCS
PLATELET # BLD AUTO: 289 THOUSANDS/UL (ref 149–390)
PMV BLD AUTO: 11.6 FL (ref 8.9–12.7)
POTASSIUM SERPL-SCNC: 4.5 MMOL/L (ref 3.5–5.3)
PROT SERPL-MCNC: 7.9 G/DL (ref 6.4–8.2)
RBC # BLD AUTO: 4.11 MILLION/UL (ref 3.81–5.12)
RH BLD: POSITIVE
SODIUM SERPL-SCNC: 142 MMOL/L (ref 136–145)
SPECIMEN EXPIRATION DATE: NORMAL
WBC # BLD AUTO: 7.94 THOUSAND/UL (ref 4.31–10.16)

## 2019-11-01 PROCEDURE — 80053 COMPREHEN METABOLIC PANEL: CPT

## 2019-11-01 PROCEDURE — 86850 RBC ANTIBODY SCREEN: CPT

## 2019-11-01 PROCEDURE — 83036 HEMOGLOBIN GLYCOSYLATED A1C: CPT

## 2019-11-01 PROCEDURE — 85025 COMPLETE CBC W/AUTO DIFF WBC: CPT

## 2019-11-01 PROCEDURE — 86901 BLOOD TYPING SEROLOGIC RH(D): CPT

## 2019-11-01 PROCEDURE — 99242 OFF/OP CONSLTJ NEW/EST SF 20: CPT | Performed by: PHYSICIAN ASSISTANT

## 2019-11-01 PROCEDURE — 36415 COLL VENOUS BLD VENIPUNCTURE: CPT | Performed by: PHYSICIAN ASSISTANT

## 2019-11-01 PROCEDURE — 86900 BLOOD TYPING SEROLOGIC ABO: CPT

## 2019-11-01 PROCEDURE — 36415 COLL VENOUS BLD VENIPUNCTURE: CPT

## 2019-11-01 NOTE — PROGRESS NOTES
Assessment/Plan:    Problem List Items Addressed This Visit        Other    Personal history of colon cancer - Primary     70-year-old female with a newly diagnosed right sided colon cancer  She has a family history of thyroid cancer and breast cancer  She qualifies for genetic testing per NCCN guidelines  We discussed risk and benefits of testing  She understands the possible outcomes of testing including no pathogenic mutation, a positive pathogenic mutation and VUS  We also discussed prophylactic procedures/screening in the event a genetic mutation identified, as well as implications for patient's family members if mutation identified  Patient agrees to proceed with testing  Venous blood sample collected and sent to [facility]  I will call the patient with results in 2-4 weeks  The patient will undergo formal genetic counseling and appropriate referrals will be made in the event of a positive finding  Other Visit Diagnoses     Visit for genetic screening          I have spent 30 minutes with patient today in which greater than 50% of this time was spent in counseling/coordination of care regarding genetic testing risks, benefits and potential outcomes  CHIEF COMPLAINT:   Genetic testing consultation      Problem:  Right sided colon cancer  Family history of thyroid and breast cancer    Previous therapy:  1  Pending surgical resection    Patient ID: Georges Smith is a 29 y o  female  Who presents to the office for genetic testing consultation  She was referred by Dr Mallory Walsh  The patient has a new diagnosis of right-sided colon cancer  She is scheduled for surgical resection  The patient denies a family history of uterine cancer  Her mother had thyroid cancer and maternal grandmother with breast cancer in her 45s  She is without acute complaints today        Review of Systems   Unable to perform ROS: Other       Current Outpatient Medications   Medication Sig Dispense Refill    albuterol (2 5 mg/3 mL) 0 083 % nebulizer solution 2 5 mg every 4 (four) hours      alosetron (LOTRONEX) 1 MG tablet Take 1 tablet (1 mg total) by mouth daily 30 tablet 3    ARIPiprazole (ABILIFY) 5 mg tablet Take 5 mg by mouth daily  0    dicyclomine (BENTYL) 20 mg tablet Take 1 tablet (20 mg total) by mouth every 6 (six) hours 360 tablet 3    docusate sodium (COLACE) 100 mg capsule Take 100 mg by mouth daily  0    FLUoxetine (PROzac) 10 mg capsule TAKE 1 CAPSULE (10 MG TOTAL) BY MOUTH ONCE DAILY  0    FLUoxetine (PROzac) 10 mg capsule Take 10 mg by mouth daily      FLUoxetine (PROzac) 20 mg capsule Take 20 mg by mouth daily  0    gabapentin (NEURONTIN) 100 mg capsule Take 1 capsule (100 mg total) by mouth 3 (three) times a day 30 capsule 0    hydrocortisone 2 5 % cream Apply topically 3 (three) times a day 30 g 0    hydrOXYzine HCL (ATARAX) 25 mg tablet Take 25 mg by mouth every 6 (six) hours as needed      ibuprofen (MOTRIN) 600 mg tablet       Lactobacillus-Inulin ([address]) CAPS Take 1 capsule by mouth      lidocaine (LMX) 4 % cream Apply topically as needed for mild pain 30 g 0    Loratadine (CLARITIN) 10 MG CAPS Take 10 mg by mouth      methocarbamol (ROBAXIN) 500 mg tablet Take 1 tablet (500 mg total) by mouth every 8 (eight) hours as needed for muscle spasms 15 tablet 0    multivitamin (THERAGRAN) TABS Take 1 tablet by mouth daily      oxyCODONE-acetaminophen (PERCOCET) 5-325 mg per tablet Take 1 tablet by mouth      PROCTOZONE-HC 2 5 % rectal cream APPLY AS DIRECTED TWICE DAILY  0    sertraline (ZOLOFT) 100 mg tablet       sertraline (ZOLOFT) 50 mg tablet Take 50 mg by mouth daily  0     No current facility-administered medications for this visit          Allergies   Allergen Reactions    Nuts      GI distress    Peanut-Containing Drug Products      GI distress    Peanut-Containing Drug Products        Past Medical History:   Diagnosis Date    Anxiety     Depression     Irritable bowel syndrome        Past Surgical History:   Procedure Laterality Date    HEMORRHOID SURGERY         OB History        2    Para   2    Term                AB        Living           SAB        TAB        Ectopic        Multiple        Live Births                     Family History   Problem Relation Age of Onset   [employer] Cancer Mother     Thyroid cancer Mother     Diabetes Father     Heart disease Father     Colon polyps Maternal Uncle     Breast cancer Maternal Grandmother     Pancreatic cancer Paternal Grandfather     Colon cancer Neg Hx        The following portions of the patient's history were reviewed and updated as appropriate: past family history, past medical history and problem list       Objective: There were no vitals taken for this visit  There is no height or weight on file to calculate BMI  Physical Exam   Constitutional: She is oriented to person, place, and time  She appears well-developed and well-nourished  Pulmonary/Chest: Effort normal    Neurological: She is alert and oriented to person, place, and time  Psychiatric: She has a normal mood and affect   Her behavior is normal  Judgment and thought content normal

## 2019-11-01 NOTE — ASSESSMENT & PLAN NOTE
54-year-old female with a newly diagnosed right sided colon cancer  She has a family history of thyroid cancer and breast cancer  She qualifies for genetic testing per NCCN guidelines  We discussed risk and benefits of testing  She understands the possible outcomes of testing including no pathogenic mutation, a positive pathogenic mutation and VUS  We also discussed prophylactic procedures/screening in the event a genetic mutation identified, as well as implications for patient's family members if mutation identified  Patient agrees to proceed with testing  Venous blood sample collected and sent to [facility]  I will call the patient with results in 2-4 weeks  The patient will undergo formal genetic counseling and appropriate referrals will be made in the event of a positive finding

## 2019-11-01 NOTE — PRE-PROCEDURE INSTRUCTIONS
[facility] acPre-Surgery Instructions:   Medication Instructions    albuterol (2 5 mg/3 mL) 0 083 % nebulizer solution Instructed patient per Anesthesia Guidelines   ARIPiprazole (ABILIFY) 5 mg tablet Instructed patient per Anesthesia Guidelines   dicyclomine (BENTYL) 20 mg tablet Instructed patient per Anesthesia Guidelines   docusate sodium (COLACE) 100 mg capsule Instructed patient per Anesthesia Guidelines   FLUoxetine (PROzac) 20 mg capsule Instructed patient per Anesthesia Guidelines   hydrOXYzine HCL (ATARAX) 25 mg tablet Instructed patient per Anesthesia Guidelines   Lactobacillus-Inulin ([address]) CAPS Instructed patient per Anesthesia Guidelines   methocarbamol (ROBAXIN) 500 mg tablet Instructed patient per Anesthesia Guidelines   multivitamin (THERAGRAN) TABS Instructed patient per Anesthesia Guidelines   Probiotic Product (PROBIOTIC DAILY PO) Instructed patient per Anesthesia Guidelines  Review via phone with patient medications and showering instructions  Advice don't use alcohol,marijuana  Stop vitamins,supplements and NSAID'S week prior DOS  Aware of showers prep instructions from MD office  Advice ASC call  Verbalized understanding  PAT phone number provided for futures questions  Antidepressant Med Class   Continue to take this medication on your normal schedule  If this is an oral medication and you take it in the morning, then you may take this medicine with a sip of water  Antipsychotic Med Class   Continue to take this medication on your normal schedule  If this is an oral medication and you take it in the morning, then you may take this medicine with a sip of water  Inhalational Med Class   Continue to take these inhaler medications on your normal schedule up to and including the day of surgery  Stool Softener Med Class   Continue to take this medication on your normal schedule    If this is an oral medication and you take it in the morning, then you may take this medicine with a sip of water  Vitamin Med Class   You may continue to take any vitamin that your surgeon has prescribed to you up to the day before surgery   If your surgeon has not specifically prescribed this vitamin or instructed you to continue then stop taking 7 days prior to surgery

## 2019-11-11 ENCOUNTER — ANESTHESIA EVENT (OUTPATIENT)
Dept: PERIOP | Facility: HOSPITAL | Age: 34
DRG: 231 | End: 2019-11-11
Payer: COMMERCIAL

## 2019-11-12 ENCOUNTER — ANESTHESIA (OUTPATIENT)
Dept: PERIOP | Facility: HOSPITAL | Age: 34
DRG: 231 | End: 2019-11-12
Payer: COMMERCIAL

## 2019-11-12 ENCOUNTER — HOSPITAL ENCOUNTER (INPATIENT)
Facility: HOSPITAL | Age: 34
LOS: 2 days | Discharge: HOME | DRG: 231 | End: 2019-11-14
Attending: COLON & RECTAL SURGERY | Admitting: COLON & RECTAL SURGERY
Payer: COMMERCIAL

## 2019-11-12 DIAGNOSIS — C18.3 CANCER OF HEPATIC FLEXURE (HCC): ICD-10-CM

## 2019-11-12 LAB
ALBUMIN SERPL BCP-MCNC: 3.8 G/DL (ref 3.5–5)
ALP SERPL-CCNC: 74 U/L (ref 46–116)
ALT SERPL W P-5'-P-CCNC: 15 U/L (ref 12–78)
ANION GAP SERPL CALCULATED.3IONS-SCNC: 8 MMOL/L (ref 4–13)
AST SERPL W P-5'-P-CCNC: 8 U/L (ref 5–45)
BASOPHILS # BLD AUTO: 0.09 THOUSANDS/ΜL (ref 0–0.1)
BASOPHILS NFR BLD AUTO: 1 % (ref 0–1)
BILIRUB SERPL-MCNC: 0.24 MG/DL (ref 0.2–1)
BUN SERPL-MCNC: 8 MG/DL (ref 5–25)
CALCIUM SERPL-MCNC: 8.8 MG/DL (ref 8.3–10.1)
CHLORIDE SERPL-SCNC: 110 MMOL/L (ref 100–108)
CO2 SERPL-SCNC: 23 MMOL/L (ref 21–32)
CREAT SERPL-MCNC: 0.64 MG/DL (ref 0.6–1.3)
EOSINOPHIL # BLD AUTO: 0.23 THOUSAND/ΜL (ref 0–0.61)
EOSINOPHIL NFR BLD AUTO: 2 % (ref 0–6)
ERYTHROCYTE [DISTWIDTH] IN BLOOD BY AUTOMATED COUNT: 13.7 % (ref 11.6–15.1)
EXT PREGNANCY TEST URINE: NEGATIVE
EXT. CONTROL: NORMAL
GFR SERPL CREATININE-BSD FRML MDRD: 117 ML/MIN/1.73SQ M
GLUCOSE SERPL-MCNC: 92 MG/DL (ref 65–140)
HCT VFR BLD AUTO: 33.9 % (ref 34.8–46.1)
HGB BLD-MCNC: 11.1 G/DL (ref 11.5–15.4)
IMM GRANULOCYTES # BLD AUTO: 0.03 THOUSAND/UL (ref 0–0.2)
IMM GRANULOCYTES NFR BLD AUTO: 0 % (ref 0–2)
LYMPHOCYTES # BLD AUTO: 1.97 THOUSANDS/ΜL (ref 0.6–4.47)
LYMPHOCYTES NFR BLD AUTO: 21 % (ref 14–44)
MCH RBC QN AUTO: 28.9 PG (ref 26.8–34.3)
MCHC RBC AUTO-ENTMCNC: 32.7 G/DL (ref 31.4–37.4)
MCV RBC AUTO: 88 FL (ref 82–98)
MONOCYTES # BLD AUTO: 0.53 THOUSAND/ΜL (ref 0.17–1.22)
MONOCYTES NFR BLD AUTO: 6 % (ref 4–12)
NEUTROPHILS # BLD AUTO: 6.57 THOUSANDS/ΜL (ref 1.85–7.62)
NEUTS SEG NFR BLD AUTO: 70 % (ref 43–75)
NRBC BLD AUTO-RTO: 0 /100 WBCS
PLATELET # BLD AUTO: 296 THOUSANDS/UL (ref 149–390)
PMV BLD AUTO: 10.8 FL (ref 8.9–12.7)
POTASSIUM SERPL-SCNC: 3.7 MMOL/L (ref 3.5–5.3)
PROT SERPL-MCNC: 7.5 G/DL (ref 6.4–8.2)
RBC # BLD AUTO: 3.84 MILLION/UL (ref 3.81–5.12)
SODIUM SERPL-SCNC: 141 MMOL/L (ref 136–145)
WBC # BLD AUTO: 9.42 THOUSAND/UL (ref 4.31–10.16)

## 2019-11-12 PROCEDURE — 8E0W4CZ ROBOTIC ASSISTED PROCEDURE OF TRUNK REGION, PERCUTANEOUS ENDOSCOPIC APPROACH: ICD-10-PCS | Performed by: COLON & RECTAL SURGERY

## 2019-11-12 PROCEDURE — 15860 IV NJX TST VASC FLO FLAP/GRF: CPT | Performed by: COLON & RECTAL SURGERY

## 2019-11-12 PROCEDURE — 85025 COMPLETE CBC W/AUTO DIFF WBC: CPT | Performed by: COLON & RECTAL SURGERY

## 2019-11-12 PROCEDURE — 44204 LAPARO PARTIAL COLECTOMY: CPT | Performed by: COLON & RECTAL SURGERY

## 2019-11-12 PROCEDURE — 80053 COMPREHEN METABOLIC PANEL: CPT | Performed by: COLON & RECTAL SURGERY

## 2019-11-12 PROCEDURE — 88342 IMHCHEM/IMCYTCHM 1ST ANTB: CPT | Performed by: PATHOLOGY

## 2019-11-12 PROCEDURE — 88341 IMHCHEM/IMCYTCHM EA ADD ANTB: CPT | Performed by: PATHOLOGY

## 2019-11-12 PROCEDURE — 88309 TISSUE EXAM BY PATHOLOGIST: CPT | Performed by: PATHOLOGY

## 2019-11-12 PROCEDURE — 44204 LAPARO PARTIAL COLECTOMY: CPT | Performed by: PHYSICIAN ASSISTANT

## 2019-11-12 PROCEDURE — 15860 IV NJX TST VASC FLO FLAP/GRF: CPT | Performed by: PHYSICIAN ASSISTANT

## 2019-11-12 PROCEDURE — 81025 URINE PREGNANCY TEST: CPT | Performed by: ANESTHESIOLOGY

## 2019-11-12 PROCEDURE — 4A1BXSH MONITORING OF GASTROINTESTINAL VASCULAR PERFUSION USING INDOCYANINE GREEN DYE, EXTERNAL APPROACH: ICD-10-PCS | Performed by: COLON & RECTAL SURGERY

## 2019-11-12 PROCEDURE — 0DTF4ZZ RESECTION OF RIGHT LARGE INTESTINE, PERCUTANEOUS ENDOSCOPIC APPROACH: ICD-10-PCS | Performed by: COLON & RECTAL SURGERY

## 2019-11-12 RX ORDER — NEOSTIGMINE METHYLSULFATE 1 MG/ML
INJECTION INTRAVENOUS AS NEEDED
Status: DISCONTINUED | OUTPATIENT
Start: 2019-11-12 | End: 2019-11-12 | Stop reason: SURG

## 2019-11-12 RX ORDER — ONDANSETRON 2 MG/ML
4 INJECTION INTRAMUSCULAR; INTRAVENOUS ONCE AS NEEDED
Status: COMPLETED | OUTPATIENT
Start: 2019-11-12 | End: 2019-11-12

## 2019-11-12 RX ORDER — DEXTROSE MONOHYDRATE AND SODIUM CHLORIDE 5; .45 G/100ML; G/100ML
125 INJECTION, SOLUTION INTRAVENOUS CONTINUOUS
Status: DISCONTINUED | OUTPATIENT
Start: 2019-11-12 | End: 2019-11-13

## 2019-11-12 RX ORDER — LIDOCAINE HYDROCHLORIDE 10 MG/ML
INJECTION, SOLUTION INFILTRATION; PERINEURAL AS NEEDED
Status: DISCONTINUED | OUTPATIENT
Start: 2019-11-12 | End: 2019-11-12 | Stop reason: SURG

## 2019-11-12 RX ORDER — DEXAMETHASONE SODIUM PHOSPHATE 10 MG/ML
INJECTION, SOLUTION INTRAMUSCULAR; INTRAVENOUS AS NEEDED
Status: DISCONTINUED | OUTPATIENT
Start: 2019-11-12 | End: 2019-11-12 | Stop reason: SURG

## 2019-11-12 RX ORDER — MAGNESIUM HYDROXIDE 1200 MG/15ML
LIQUID ORAL AS NEEDED
Status: DISCONTINUED | OUTPATIENT
Start: 2019-11-12 | End: 2019-11-12 | Stop reason: HOSPADM

## 2019-11-12 RX ORDER — HEPARIN SODIUM 5000 [USP'U]/ML
5000 INJECTION, SOLUTION INTRAVENOUS; SUBCUTANEOUS ONCE
Status: COMPLETED | OUTPATIENT
Start: 2019-11-12 | End: 2019-11-12

## 2019-11-12 RX ORDER — CEFAZOLIN SODIUM 1 G/50ML
1000 SOLUTION INTRAVENOUS ONCE
Status: COMPLETED | OUTPATIENT
Start: 2019-11-12 | End: 2019-11-12

## 2019-11-12 RX ORDER — INDOCYANINE GREEN AND WATER 25 MG
KIT INJECTION AS NEEDED
Status: DISCONTINUED | OUTPATIENT
Start: 2019-11-12 | End: 2019-11-12 | Stop reason: SURG

## 2019-11-12 RX ORDER — ONDANSETRON 2 MG/ML
4 INJECTION INTRAMUSCULAR; INTRAVENOUS EVERY 6 HOURS PRN
Status: DISCONTINUED | OUTPATIENT
Start: 2019-11-12 | End: 2019-11-14 | Stop reason: HOSPADM

## 2019-11-12 RX ORDER — PROPOFOL 10 MG/ML
INJECTION, EMULSION INTRAVENOUS CONTINUOUS PRN
Status: DISCONTINUED | OUTPATIENT
Start: 2019-11-12 | End: 2019-11-12

## 2019-11-12 RX ORDER — ACETAMINOPHEN 325 MG/1
650 TABLET ORAL EVERY 4 HOURS PRN
Status: DISCONTINUED | OUTPATIENT
Start: 2019-11-12 | End: 2019-11-14 | Stop reason: HOSPADM

## 2019-11-12 RX ORDER — NEOMYCIN SULFATE 500 MG/1
1000 TABLET ORAL 3 TIMES DAILY
Status: DISCONTINUED | OUTPATIENT
Start: 2019-11-12 | End: 2019-11-12

## 2019-11-12 RX ORDER — SCOLOPAMINE TRANSDERMAL SYSTEM 1 MG/1
1 PATCH, EXTENDED RELEASE TRANSDERMAL
Status: DISCONTINUED | OUTPATIENT
Start: 2019-11-12 | End: 2019-11-14 | Stop reason: HOSPADM

## 2019-11-12 RX ORDER — MIDAZOLAM HYDROCHLORIDE 2 MG/2ML
INJECTION, SOLUTION INTRAMUSCULAR; INTRAVENOUS AS NEEDED
Status: DISCONTINUED | OUTPATIENT
Start: 2019-11-12 | End: 2019-11-12 | Stop reason: SURG

## 2019-11-12 RX ORDER — METHOCARBAMOL 500 MG/1
500 TABLET, FILM COATED ORAL EVERY 8 HOURS PRN
Status: DISCONTINUED | OUTPATIENT
Start: 2019-11-12 | End: 2019-11-14 | Stop reason: HOSPADM

## 2019-11-12 RX ORDER — PROPOFOL 10 MG/ML
INJECTION, EMULSION INTRAVENOUS AS NEEDED
Status: DISCONTINUED | OUTPATIENT
Start: 2019-11-12 | End: 2019-11-12 | Stop reason: SURG

## 2019-11-12 RX ORDER — KETAMINE HYDROCHLORIDE 50 MG/ML
INJECTION, SOLUTION, CONCENTRATE INTRAMUSCULAR; INTRAVENOUS AS NEEDED
Status: DISCONTINUED | OUTPATIENT
Start: 2019-11-12 | End: 2019-11-12 | Stop reason: SURG

## 2019-11-12 RX ORDER — OXYCODONE HYDROCHLORIDE 5 MG/1
5 TABLET ORAL EVERY 4 HOURS PRN
Status: DISCONTINUED | OUTPATIENT
Start: 2019-11-12 | End: 2019-11-14 | Stop reason: HOSPADM

## 2019-11-12 RX ORDER — HYDROMORPHONE HCL/PF 1 MG/ML
SYRINGE (ML) INJECTION AS NEEDED
Status: DISCONTINUED | OUTPATIENT
Start: 2019-11-12 | End: 2019-11-12 | Stop reason: SURG

## 2019-11-12 RX ORDER — GLYCOPYRROLATE 0.2 MG/ML
INJECTION INTRAMUSCULAR; INTRAVENOUS AS NEEDED
Status: DISCONTINUED | OUTPATIENT
Start: 2019-11-12 | End: 2019-11-12 | Stop reason: SURG

## 2019-11-12 RX ORDER — METOCLOPRAMIDE HYDROCHLORIDE 5 MG/ML
10 INJECTION INTRAMUSCULAR; INTRAVENOUS ONCE AS NEEDED
Status: DISCONTINUED | OUTPATIENT
Start: 2019-11-12 | End: 2019-11-12 | Stop reason: HOSPADM

## 2019-11-12 RX ORDER — ONDANSETRON 2 MG/ML
INJECTION INTRAMUSCULAR; INTRAVENOUS AS NEEDED
Status: DISCONTINUED | OUTPATIENT
Start: 2019-11-12 | End: 2019-11-12 | Stop reason: SURG

## 2019-11-12 RX ORDER — LIDOCAINE HYDROCHLORIDE 10 MG/ML
0.5 INJECTION, SOLUTION EPIDURAL; INFILTRATION; INTRACAUDAL; PERINEURAL ONCE AS NEEDED
Status: COMPLETED | OUTPATIENT
Start: 2019-11-12 | End: 2019-11-12

## 2019-11-12 RX ORDER — SODIUM CHLORIDE, SODIUM LACTATE, POTASSIUM CHLORIDE, CALCIUM CHLORIDE 600; 310; 30; 20 MG/100ML; MG/100ML; MG/100ML; MG/100ML
125 INJECTION, SOLUTION INTRAVENOUS CONTINUOUS
Status: DISCONTINUED | OUTPATIENT
Start: 2019-11-12 | End: 2019-11-12

## 2019-11-12 RX ORDER — DEXMEDETOMIDINE HYDROCHLORIDE 100 UG/ML
INJECTION, SOLUTION INTRAVENOUS AS NEEDED
Status: DISCONTINUED | OUTPATIENT
Start: 2019-11-12 | End: 2019-11-12 | Stop reason: SURG

## 2019-11-12 RX ORDER — SODIUM CHLORIDE 9 MG/ML
INJECTION, SOLUTION INTRAVENOUS CONTINUOUS PRN
Status: DISCONTINUED | OUTPATIENT
Start: 2019-11-12 | End: 2019-11-12

## 2019-11-12 RX ORDER — MORPHINE SULFATE 10 MG/ML
2 INJECTION, SOLUTION INTRAMUSCULAR; INTRAVENOUS EVERY 2 HOUR PRN
Status: DISCONTINUED | OUTPATIENT
Start: 2019-11-12 | End: 2019-11-13

## 2019-11-12 RX ORDER — FLUOXETINE HYDROCHLORIDE 20 MG/1
20 CAPSULE ORAL DAILY
Status: DISCONTINUED | OUTPATIENT
Start: 2019-11-13 | End: 2019-11-14 | Stop reason: HOSPADM

## 2019-11-12 RX ORDER — METRONIDAZOLE 500 MG/1
1000 TABLET ORAL 3 TIMES DAILY
Status: DISCONTINUED | OUTPATIENT
Start: 2019-11-12 | End: 2019-11-12

## 2019-11-12 RX ORDER — ROCURONIUM BROMIDE 10 MG/ML
INJECTION, SOLUTION INTRAVENOUS AS NEEDED
Status: DISCONTINUED | OUTPATIENT
Start: 2019-11-12 | End: 2019-11-12 | Stop reason: SURG

## 2019-11-12 RX ORDER — HYDROMORPHONE HCL/PF 1 MG/ML
0.5 SYRINGE (ML) INJECTION
Status: DISCONTINUED | OUTPATIENT
Start: 2019-11-12 | End: 2019-11-12 | Stop reason: HOSPADM

## 2019-11-12 RX ORDER — ARIPIPRAZOLE 5 MG/1
5 TABLET ORAL DAILY
Status: DISCONTINUED | OUTPATIENT
Start: 2019-11-13 | End: 2019-11-14 | Stop reason: HOSPADM

## 2019-11-12 RX ORDER — FENTANYL CITRATE 50 UG/ML
INJECTION, SOLUTION INTRAMUSCULAR; INTRAVENOUS AS NEEDED
Status: DISCONTINUED | OUTPATIENT
Start: 2019-11-12 | End: 2019-11-12 | Stop reason: SURG

## 2019-11-12 RX ADMIN — SODIUM CHLORIDE, SODIUM LACTATE, POTASSIUM CHLORIDE, AND CALCIUM CHLORIDE: .6; .31; .03; .02 INJECTION, SOLUTION INTRAVENOUS at 16:00

## 2019-11-12 RX ADMIN — DEXMEDETOMIDINE 0.4 MCG/KG/HR: 100 INJECTION, SOLUTION, CONCENTRATE INTRAVENOUS at 17:04

## 2019-11-12 RX ADMIN — GLYCOPYRROLATE 0.4 MG: 0.2 INJECTION, SOLUTION INTRAMUSCULAR; INTRAVENOUS at 18:36

## 2019-11-12 RX ADMIN — HYDROMORPHONE HYDROCHLORIDE 0.5 MG: 1 INJECTION, SOLUTION INTRAMUSCULAR; INTRAVENOUS; SUBCUTANEOUS at 15:31

## 2019-11-12 RX ADMIN — HYDROMORPHONE HYDROCHLORIDE 0.5 MG: 1 INJECTION, SOLUTION INTRAMUSCULAR; INTRAVENOUS; SUBCUTANEOUS at 16:29

## 2019-11-12 RX ADMIN — HYDROMORPHONE HYDROCHLORIDE 0.5 MG: 1 INJECTION, SOLUTION INTRAMUSCULAR; INTRAVENOUS; SUBCUTANEOUS at 15:20

## 2019-11-12 RX ADMIN — DEXAMETHASONE SODIUM PHOSPHATE 10 MG: 10 INJECTION, SOLUTION INTRAMUSCULAR; INTRAVENOUS at 14:35

## 2019-11-12 RX ADMIN — PROPOFOL 120 MCG/KG/MIN: 10 INJECTION, EMULSION INTRAVENOUS at 14:39

## 2019-11-12 RX ADMIN — HYDROMORPHONE HYDROCHLORIDE 0.5 MG: 1 INJECTION, SOLUTION INTRAMUSCULAR; INTRAVENOUS; SUBCUTANEOUS at 18:56

## 2019-11-12 RX ADMIN — KETAMINE HYDROCHLORIDE 30 MG: 50 INJECTION, SOLUTION INTRAMUSCULAR; INTRAVENOUS at 16:04

## 2019-11-12 RX ADMIN — NEOSTIGMINE METHYLSULFATE 3 MG: 1 INJECTION, SOLUTION INTRAVENOUS at 18:36

## 2019-11-12 RX ADMIN — ONDANSETRON 4 MG: 2 INJECTION INTRAMUSCULAR; INTRAVENOUS at 18:29

## 2019-11-12 RX ADMIN — OXYCODONE HYDROCHLORIDE 5 MG: 5 TABLET ORAL at 20:44

## 2019-11-12 RX ADMIN — ROCURONIUM BROMIDE 50 MG: 50 INJECTION, SOLUTION INTRAVENOUS at 14:31

## 2019-11-12 RX ADMIN — MORPHINE SULFATE 2 MG: 10 INJECTION INTRAVENOUS at 22:46

## 2019-11-12 RX ADMIN — CEFAZOLIN SODIUM 1000 MG: 1 SOLUTION INTRAVENOUS at 14:37

## 2019-11-12 RX ADMIN — FENTANYL CITRATE 50 MCG: 50 INJECTION, SOLUTION INTRAMUSCULAR; INTRAVENOUS at 14:31

## 2019-11-12 RX ADMIN — KETAMINE HYDROCHLORIDE 20 MG: 50 INJECTION, SOLUTION INTRAMUSCULAR; INTRAVENOUS at 16:55

## 2019-11-12 RX ADMIN — ROCURONIUM BROMIDE 20 MG: 50 INJECTION, SOLUTION INTRAVENOUS at 16:31

## 2019-11-12 RX ADMIN — ROCURONIUM BROMIDE 20 MG: 50 INJECTION, SOLUTION INTRAVENOUS at 17:27

## 2019-11-12 RX ADMIN — LIDOCAINE HYDROCHLORIDE 0.5 ML: 10 INJECTION, SOLUTION EPIDURAL; INFILTRATION; INTRACAUDAL; PERINEURAL at 13:04

## 2019-11-12 RX ADMIN — SODIUM CHLORIDE: 0.9 INJECTION, SOLUTION INTRAVENOUS at 17:12

## 2019-11-12 RX ADMIN — PHENYLEPHRINE HYDROCHLORIDE 100 MCG: 10 INJECTION INTRAVENOUS at 14:49

## 2019-11-12 RX ADMIN — SODIUM CHLORIDE, SODIUM LACTATE, POTASSIUM CHLORIDE, AND CALCIUM CHLORIDE 125 ML/HR: .6; .31; .03; .02 INJECTION, SOLUTION INTRAVENOUS at 13:04

## 2019-11-12 RX ADMIN — FENTANYL CITRATE 50 MCG: 50 INJECTION, SOLUTION INTRAMUSCULAR; INTRAVENOUS at 14:38

## 2019-11-12 RX ADMIN — ROCURONIUM BROMIDE 20 MG: 50 INJECTION, SOLUTION INTRAVENOUS at 15:11

## 2019-11-12 RX ADMIN — METRONIDAZOLE 500 MG: 500 INJECTION, SOLUTION INTRAVENOUS at 14:41

## 2019-11-12 RX ADMIN — INDOCYANINE GREEN AND WATER 8 MG: KIT at 17:56

## 2019-11-12 RX ADMIN — CEFAZOLIN SODIUM 1000 MG: 1 SOLUTION INTRAVENOUS at 18:28

## 2019-11-12 RX ADMIN — LIDOCAINE HYDROCHLORIDE 50 MG: 10 INJECTION, SOLUTION INFILTRATION; PERINEURAL at 14:31

## 2019-11-12 RX ADMIN — ROCURONIUM BROMIDE 20 MG: 50 INJECTION, SOLUTION INTRAVENOUS at 15:55

## 2019-11-12 RX ADMIN — ONDANSETRON 4 MG: 2 INJECTION INTRAMUSCULAR; INTRAVENOUS at 18:55

## 2019-11-12 RX ADMIN — MIDAZOLAM 2 MG: 1 INJECTION INTRAMUSCULAR; INTRAVENOUS at 14:29

## 2019-11-12 RX ADMIN — DEXMEDETOMIDINE 20 MCG: 100 INJECTION, SOLUTION, CONCENTRATE INTRAVENOUS at 17:02

## 2019-11-12 RX ADMIN — INDOCYANINE GREEN AND WATER 8 MG: KIT at 16:57

## 2019-11-12 RX ADMIN — HYDROMORPHONE HYDROCHLORIDE 0.5 MG: 1 INJECTION, SOLUTION INTRAMUSCULAR; INTRAVENOUS; SUBCUTANEOUS at 19:03

## 2019-11-12 RX ADMIN — DEXTROSE AND SODIUM CHLORIDE 125 ML/HR: 5; .45 INJECTION, SOLUTION INTRAVENOUS at 20:44

## 2019-11-12 RX ADMIN — ROCURONIUM BROMIDE 10 MG: 50 INJECTION, SOLUTION INTRAVENOUS at 18:01

## 2019-11-12 RX ADMIN — SODIUM CHLORIDE: 0.9 INJECTION, SOLUTION INTRAVENOUS at 14:39

## 2019-11-12 RX ADMIN — PROPOFOL 200 MG: 10 INJECTION, EMULSION INTRAVENOUS at 14:31

## 2019-11-12 RX ADMIN — PHENYLEPHRINE HYDROCHLORIDE 25 MCG/MIN: 10 INJECTION INTRAVENOUS at 14:58

## 2019-11-12 RX ADMIN — HYDROMORPHONE HYDROCHLORIDE 0.5 MG: 1 INJECTION, SOLUTION INTRAMUSCULAR; INTRAVENOUS; SUBCUTANEOUS at 19:18

## 2019-11-12 RX ADMIN — ONDANSETRON 4 MG: 2 INJECTION INTRAMUSCULAR; INTRAVENOUS at 14:29

## 2019-11-12 RX ADMIN — HEPARIN SODIUM 5000 UNITS: 5000 INJECTION INTRAVENOUS; SUBCUTANEOUS at 22:37

## 2019-11-12 NOTE — ANESTHESIA PREPROCEDURE EVALUATION
Review of Systems/Medical History  Patient summary reviewed  Chart reviewed  No history of anesthetic complications     Cardiovascular  Negative cardio ROS Exercise tolerance (METS): >4,     Pulmonary  Negative pulmonary ROS        GI/Hepatic    GI malignancy,        Negative  ROS        Endo/Other  Negative endo/other ROS      GYN  Negative gynecology ROS          Hematology  Negative hematology ROS      Musculoskeletal  Negative musculoskeletal ROS        Neurology  Negative neurology ROS      Psychology   Anxiety, Depression , being treated for depression,              Physical Exam    Airway    Mallampati score: II  TM Distance: >3 FB  Neck ROM: full     Dental       Cardiovascular  Comment: Negative ROS,     Pulmonary      Other Findings        Anesthesia Plan  ASA Score- 2     Anesthesia Type- general with ASA Monitors  Additional Monitors:   Airway Plan: ETT  Plan Factors-    Induction- intravenous  Postoperative Plan-     Informed Consent- Anesthetic plan and risks discussed with patient  I personally reviewed this patient with the CRNA  Discussed and agreed on the Anesthesia Plan with the CRNA  Pastor Briscoe

## 2019-11-12 NOTE — ANESTHESIA POSTPROCEDURE EVALUATION
Post-Op Assessment Note    CV Status:  Stable  Pain Score: 0    Pain management: adequate     Mental Status:  Alert and awake   Hydration Status:  Euvolemic   PONV Controlled:  Controlled   Airway Patency:  Patent   Post Op Vitals Reviewed: Yes      Staff: CRNA           /55 (11/12/19 1845)    Temp (!) 97 2 °F (36 2 °C) (11/12/19 1847)    Pulse 77 (11/12/19 1847)   Resp 18 (11/12/19 1847)    SpO2 99 % (11/12/19 1847)

## 2019-11-12 NOTE — OP NOTE
OPERATIVE REPORT  PATIENT NAME: Tika Benitez    :  1985  MRN: [id number]  Pt Location: [facility] OR ROOM 14    SURGERY DATE: 2019    Surgeon(s) and Role:     * Alexandru Phillips MD - Primary     * Elie Best PA-C - Assisting     * June Estrada PA-C - Assisting  The PAs were required for retraction and assistance with the procedure  No resident was available for the operation  Preop Diagnosis:  Cancer of hepatic flexure (HCC) [C18 3]    Post-Op Diagnosis Codes:     * Cancer of hepatic flexure (HCC) [C18 3]    Procedure(s) (LRB):  COLECTOMY ROBOTIC, right or subtotal (N/A), with terminal ileum  Ileocolic anastomosis  Laser fluorescence angiography  Specimen(s):  ID Type Source Tests Collected by Time Destination   1 : ileocolectomy Tissue Other TISSUE EXAM Alexandru Phillips MD 2019 1818        Estimated Blood Loss:   25 mL    Drains:  Urethral Catheter Non-latex 16 Fr  (Active)   Number of days: 0       Anesthesia Type:   General    Operative Indications:  Cancer of hepatic flexure ([address] ) [C18 3]    Operative Findings:  Tumor at hepatic flexure  No serosal invasion was noted  Complications:   None    Procedure and Technique:  The patient was placed in a supine position with her arms positioned at her sides  Her legs were cushion behind the knees and the heels were cushioned with soft egg crate material   Soft egg crate material was placed in a double-layer around her arms which were tucked at her sides using the draw sheet  The egg crate material was also used over her shoulders as we taper into position on the table using a bandolier double layer tape application  The abdomen was prepped widely using ChloraPrep and allowed to dry completely  She was draped in a sterile fashion  A time-out was done  Robotic surgery was prepared  We did a very small cutdown incision and a horizontal opening at the left lower quadrant    The skin was opened with a scalpel and dissection was carried down through the anterior fascia using cautery  A [place] clamp was used to enter the peritoneum and the opening was widened  An Leroy port sheath was positioned with the cap on it  Air insufflation allowed for inspection of the abdominal cavity with the camera  No peritoneal abnormalities were identified  Trocars were positioned using 9 mm trocars in the lower left midline the left lower quadrant and medial to the Leroy using 8 mm trocars  A 12 mm trocar was placed in the left upper abdomen  The robot was then docked with orientation toward hepatic flexure  Robotic dissection was then initiated at the proximal transverse colon mesentery where the medial approach was used to expose the duodenum anterolaterally  The duodenum was  from the mesentery and the avascular plane was used to dissect down to the right colic artery and through to the for lateral side of the hepatic flexure region through an avascular window  The ileocolic vasculature was then dissected free and was transected using a vessel sealer  The avascular plane identified was then used to dissect the mesentery of the colon away from the attachments  The right colon was then nearly completely freed up before the right colic artery was transected using the vessel sealer  No bleeding was and countered at this juncture  Dissection was then carried to complete the duodenal separation from the colon mesentery  The middle colic artery was transected due to the close proximity of the tumor to the mid transverse colon  An anastomosis just to the left of the mid transverse colon was then planned  The superior attachments of the greater omentum were  from the colon and the lateral attachments were then taken down along the transverse colon and around hepatic flexure    Dissection was carried down to the cecum and then the ileum was lifted superiorly to dissect at the base of the ileum to separate the ileal mesentery from retroperitoneal structures  This completed the detachment of colon from the retroperitoneal structures  The mesentery was fashioned at the terminal ileum with transection out to the level of the bowel using the vessel sealer  Similarly, the colon to the left of the mid transverse colon was prepared for anastomosis by  all the vascular structures and mesentery attached to the colon  At this time we aligned the terminal ileum and the transverse colon to the left of the midline  We used firefly laser fluorescence angiography to identify the sites of the anastomosis that would be well-vascularized  A 2 0 Vicryl suture was used to create adhesion between the bowel on the portion that would be excised  This allowed for lining the bowel up for placement of the stapler  The colon was opened on the anti mesenteric surface at at a tenia and the small bowel was opened and on the anti mesenteric surface and he could distant position away from where the bowel would be transected based on firefly evaluation  The stapler was run into the openings and was closed on the anti mesenteric surface and fired to complete a 5 cm length of stapling on the bowel, creating a wide anastomosis  No spillage occurred at this point or during the closure of the opening  I left the 1 5 cm length of bowel that was well-vascularized on the bowel to prevent any vascular compromise at the staple line  The stapler was removed and the opening created by the stapling was closed using a 3 0 V lock suture that was placed in a running fashion followed by running the suture back upon the wound in a Lembert type suture closure  The 2 layer closure was completely intact by all visualization and a repeat of the firefly revealed that the bowel was well vascularized and that are selected site for transection was well away from the staple line  The stapler was used to transect the colon and small bowel at this point    Re-evaluation with the firefly showed that the transection points were well-vascularized  We then evaluated all the dissection planes and found that the areas were free of surgical bleeding  The bowel was then grasp at 1 end and was to withdrawn through the left lower quadrant Leroy sheath using as a wound protector  The bowel was easily removed and revealed no evidence of serosal invasion of tumor  The incision created was very short at approximately 3 and 0 5 cm in length  Trocars and the [place] were then removed  The trocars were non cutting trocars and the wounds were not closed at the fascial level  The Leroy site was closed using 0 Vicryl suture at the peritoneum followed by a 1 PDS suture in the fascia  Wounds were irrigated and dried and the skin was closed using running or interrupted subcuticular 4 O Monocryl suture depending on the length of the wound  Wounds were cleansed and dried and then covered with Histoacryl  Sponge needle and instrument counts were correct  The patient tolerated the procedure well      I was present for the entire procedure    Patient Disposition:  PACU     SIGNATURE: Kathleen Dumas MD  DATE: November 12, 2019  TIME: 6:38 PM

## 2019-11-12 NOTE — H&P
History and Physical   Colon and Rectal Surgery   Elizabeth Youngblood 29 y o  female MRN: [id number]  Unit/Bed#: OR [place] Encounter: [id number]  11/12/19   1:39 PM      CC: Colon cancer    History of Present Illness   HPI:  Arabella Goldberg is a 29 y o  female for right colectomy    Historical Information   Past Medical History:   Diagnosis Date    Anxiety     Cancer ([address] )     colon    Depression     Irritable bowel syndrome      Past Surgical History:   Procedure Laterality Date    COLONOSCOPY      HEMORRHOID SURGERY         Meds/Allergies     Medications Prior to Admission   Medication    ARIPiprazole (ABILIFY) 5 mg tablet    dicyclomine (BENTYL) 20 mg tablet    FLUoxetine (PROzac) 20 mg capsule    hydrOXYzine HCL (ATARAX) 25 mg tablet    Lactobacillus-Inulin ([facility]) CAPS    methocarbamol (ROBAXIN) 500 mg tablet    multivitamin (THERAGRAN) TABS    Probiotic Product (PROBIOTIC DAILY PO)    albuterol (2 5 mg/3 mL) 0 083 % nebulizer solution    docusate sodium (COLACE) 100 mg capsule         Current Facility-Administered Medications:     ceFAZolin (ANCEF) IVPB (premix) 1,000 mg, 1,000 mg, Intravenous, Once **AND** metroNIDAZOLE (FLAGYL) IVPB (premix) 500 mg, 500 mg, Intravenous, Once, W García Mcclendon MD    lactated ringers infusion, 125 mL/hr, Intravenous, Continuous, Jing Farrell MD, Last Rate: 125 mL/hr at 11/12/19 1304, 125 mL/hr at 11/12/19 1304    Allergies   Allergen Reactions    Nuts      GI distress    Peanut-Containing Drug Products      GI distress    Peanut-Containing Drug Products          Social History   Social History     Substance and Sexual Activity   Alcohol Use Yes    Alcohol/week: 1 0 standard drinks    Types: 1 Glasses of wine per week    Frequency: 2-4 times a month     Social History     Substance and Sexual Activity   Drug Use Yes    Types: Marijuana    Comment: medical -not used since wednesday     Social History     Tobacco Use   Smoking Status Never Smoker   Smokeless Tobacco Never Used         Family History:   Family History   Problem Relation Age of Onset    Cancer Mother     Thyroid cancer Mother     Diabetes Father     Heart disease Father     Colon polyps Maternal Uncle     Breast cancer Maternal Grandmother     Pancreatic cancer Paternal Grandfather     Colon cancer Neg Hx          Objective     Current Vitals:   Blood Pressure: 106/74 (11/12/19 1245)  Pulse: 96 (11/12/19 1245)  Temperature: 98 3 °F (36 8 °C) (11/12/19 1245)  Temp Source: Tympanic (11/12/19 1245)  Respirations: 16 (11/12/19 1245)  Height: 5' 2" (157 5 cm) (11/12/19 1245)  Weight - Scale: 62 6 kg (138 lb) (11/12/19 1245)  SpO2: 98 % (11/12/19 1245)  No intake or output data in the 24 hours ending 11/12/19 1339    Physical Exam:  General:  Well nourished, no distress  Neuro: Alert and oriented  Eyes:Sclera anicteric, conjunctiva pink  Pulm: Clear to auscultation bilaterally  No respiratory Distress  CV:  Regular rate and rhythm  No murmurs  Abdomen:  Soft, flat, non-tender, without masses or hepatosplenomegaly  Lab Results:       ASSESSMENT:  Tyrone Barker is a 29 y o  female for right colectomy  She has not received her genetic testing results yet  We discussed subtotal colectomy but she prefers hemicolectomy regardless of the genetic testing results  She knows that yearly colonoscopy is necessary and she agrees to that plan  She has some diarrhea issues and wants to retain the remainder of her colon  Tiffany Valladares PLAN: Risks of surgery, including but not limited to bleeding, pain, infection, hernia formation, injury to the ureter or other internal organs requiring surgery specific to these injuries, anastomotic leak, deep vein thrombosis, pulmonary embolism, myocardial infarction or heart failure, stroke, death, need for and enterostomy, or other unspecified complications were discussed  Benefits and alternatives were discussed  Questions were answered       W  Jami Farrell MD

## 2019-11-13 LAB
ALBUMIN SERPL BCP-MCNC: 3.3 G/DL (ref 3.5–5)
ALP SERPL-CCNC: 58 U/L (ref 46–116)
ALT SERPL W P-5'-P-CCNC: 14 U/L (ref 12–78)
ANION GAP SERPL CALCULATED.3IONS-SCNC: 10 MMOL/L (ref 4–13)
AST SERPL W P-5'-P-CCNC: 11 U/L (ref 5–45)
BILIRUB SERPL-MCNC: 0.29 MG/DL (ref 0.2–1)
BUN SERPL-MCNC: 4 MG/DL (ref 5–25)
CALCIUM SERPL-MCNC: 7.9 MG/DL (ref 8.3–10.1)
CHLORIDE SERPL-SCNC: 112 MMOL/L (ref 100–108)
CO2 SERPL-SCNC: 20 MMOL/L (ref 21–32)
CREAT SERPL-MCNC: 0.55 MG/DL (ref 0.6–1.3)
ERYTHROCYTE [DISTWIDTH] IN BLOOD BY AUTOMATED COUNT: 13.4 % (ref 11.6–15.1)
GFR SERPL CREATININE-BSD FRML MDRD: 123 ML/MIN/1.73SQ M
GLUCOSE SERPL-MCNC: 128 MG/DL (ref 65–140)
HCT VFR BLD AUTO: 30.7 % (ref 34.8–46.1)
HGB BLD-MCNC: 9.9 G/DL (ref 11.5–15.4)
MCH RBC QN AUTO: 29 PG (ref 26.8–34.3)
MCHC RBC AUTO-ENTMCNC: 32.2 G/DL (ref 31.4–37.4)
MCV RBC AUTO: 90 FL (ref 82–98)
PLATELET # BLD AUTO: 250 THOUSANDS/UL (ref 149–390)
PMV BLD AUTO: 11.9 FL (ref 8.9–12.7)
POTASSIUM SERPL-SCNC: 3.4 MMOL/L (ref 3.5–5.3)
PROT SERPL-MCNC: 6.3 G/DL (ref 6.4–8.2)
RBC # BLD AUTO: 3.41 MILLION/UL (ref 3.81–5.12)
SODIUM SERPL-SCNC: 142 MMOL/L (ref 136–145)
WBC # BLD AUTO: 13.49 THOUSAND/UL (ref 4.31–10.16)

## 2019-11-13 PROCEDURE — 85027 COMPLETE CBC AUTOMATED: CPT | Performed by: PHYSICIAN ASSISTANT

## 2019-11-13 PROCEDURE — 80053 COMPREHEN METABOLIC PANEL: CPT | Performed by: PHYSICIAN ASSISTANT

## 2019-11-13 RX ORDER — OXYCODONE HYDROCHLORIDE 5 MG/1
10 TABLET ORAL EVERY 4 HOURS PRN
Status: DISCONTINUED | OUTPATIENT
Start: 2019-11-13 | End: 2019-11-14 | Stop reason: HOSPADM

## 2019-11-13 RX ORDER — POTASSIUM CHLORIDE 20 MEQ/1
40 TABLET, EXTENDED RELEASE ORAL ONCE
Status: COMPLETED | OUTPATIENT
Start: 2019-11-13 | End: 2019-11-13

## 2019-11-13 RX ORDER — HYDROXYZINE HYDROCHLORIDE 25 MG/1
25 TABLET, FILM COATED ORAL EVERY 6 HOURS PRN
Status: DISCONTINUED | OUTPATIENT
Start: 2019-11-13 | End: 2019-11-14 | Stop reason: HOSPADM

## 2019-11-13 RX ORDER — HYDROMORPHONE HCL/PF 1 MG/ML
0.2 SYRINGE (ML) INJECTION
Status: DISCONTINUED | OUTPATIENT
Start: 2019-11-13 | End: 2019-11-14

## 2019-11-13 RX ADMIN — MORPHINE SULFATE 2 MG: 10 INJECTION INTRAVENOUS at 03:22

## 2019-11-13 RX ADMIN — POTASSIUM CHLORIDE 40 MEQ: 1500 TABLET, EXTENDED RELEASE ORAL at 09:21

## 2019-11-13 RX ADMIN — OXYCODONE HYDROCHLORIDE 10 MG: 10 TABLET ORAL at 21:35

## 2019-11-13 RX ADMIN — ACETAMINOPHEN 650 MG: 325 TABLET ORAL at 08:26

## 2019-11-13 RX ADMIN — METHOCARBAMOL TABLETS 500 MG: 500 TABLET, COATED ORAL at 01:32

## 2019-11-13 RX ADMIN — MORPHINE SULFATE 2 MG: 10 INJECTION INTRAVENOUS at 14:06

## 2019-11-13 RX ADMIN — OXYCODONE HYDROCHLORIDE 10 MG: 10 TABLET ORAL at 17:35

## 2019-11-13 RX ADMIN — ARIPIPRAZOLE 5 MG: 5 TABLET ORAL at 08:26

## 2019-11-13 RX ADMIN — FLUOXETINE 20 MG: 20 CAPSULE ORAL at 08:27

## 2019-11-13 RX ADMIN — DEXTROSE AND SODIUM CHLORIDE 125 ML/HR: 5; .45 INJECTION, SOLUTION INTRAVENOUS at 04:38

## 2019-11-13 RX ADMIN — HYDROMORPHONE HYDROCHLORIDE 0.2 MG: 1 INJECTION, SOLUTION INTRAMUSCULAR; INTRAVENOUS; SUBCUTANEOUS at 14:34

## 2019-11-13 RX ADMIN — OXYCODONE HYDROCHLORIDE 5 MG: 5 TABLET ORAL at 01:04

## 2019-11-13 RX ADMIN — HYDROXYZINE HYDROCHLORIDE 25 MG: 25 TABLET, FILM COATED ORAL at 01:59

## 2019-11-13 RX ADMIN — OXYCODONE HYDROCHLORIDE 5 MG: 5 TABLET ORAL at 12:26

## 2019-11-13 RX ADMIN — ENOXAPARIN SODIUM 40 MG: 40 INJECTION SUBCUTANEOUS at 08:26

## 2019-11-13 RX ADMIN — METHOCARBAMOL TABLETS 500 MG: 500 TABLET, COATED ORAL at 13:58

## 2019-11-13 NOTE — PROGRESS NOTES
Progress Note - General Surgery   Elizabeth Youngblood 29 y o  female MRN: [id number]  Unit/Bed#: [facility] 828-01 Encounter: [id number]    Assessment:  34F w/ hepatic flexure cancer, now s/p Robotic R/subtotal colectomy w/ ileocolic anastamosis on 95/23, POD#1    Plan:  Clears - ADAT pending bowel function  D/C Cates  Fluids to 75 cc/hr, can saline lock pending tolerating diet  IS  OOB/Ambulate  Pain control PRN    Subjective/Objective   Chief Complaint:     Subjective: no acute events overnight  Patient is anxious and complained of R sided abdominal discomfort however given this is where the robotic arms were, this is to be expected    Objective:     Blood pressure 134/78, pulse 97, temperature 98 7 °F (37 1 °C), resp  rate 15, height 5' 3" (1 6 m), weight 65 kg (143 lb 3 2 oz), SpO2 96 %  ,Body mass index is 25 37 kg/m²  Intake/Output Summary (Last 24 hours) at 11/13/2019 0804  Last data filed at 11/13/2019 0358  Gross per 24 hour   Intake 3700 ml   Output 1375 ml   Net 2325 ml       Invasive Devices     Peripheral Intravenous Line            Peripheral IV 11/12/19 Left Hand less than 1 day    Peripheral IV 11/12/19 Right Arm less than 1 day          Drain            Urethral Catheter Non-latex 16 Fr  less than 1 day                Physical Exam: General: AAOx3  Head: normocephalic, atraumatic  Neck: supple, trachea midline   Respiratory: BS b/l  Abdomen: Soft, appropriately tender at incisions on R side of abdomen, Incision c/d/i  Heart: RRR, S1s2  Ext: Warm no cyanosis   Pulse: 2+ radial      Lab, Imaging and other studies:  I have personally reviewed pertinent lab results    , CBC:   Lab Results   Component Value Date    WBC 13 49 (H) 11/13/2019    HGB 9 9 (L) 11/13/2019    HCT 30 7 (L) 11/13/2019    MCV 90 11/13/2019     11/13/2019    MCH 29 0 11/13/2019    MCHC 32 2 11/13/2019    RDW 13 4 11/13/2019    MPV 11 9 11/13/2019    NRBC 0 11/12/2019   , CMP:   Lab Results   Component Value Date    SODIUM 142 11/13/2019    K 3 4 (L) 11/13/2019     (H) 11/13/2019    CO2 20 (L) 11/13/2019    BUN 4 (L) 11/13/2019    CREATININE 0 55 (L) 11/13/2019    CALCIUM 7 9 (L) 11/13/2019    AST 11 11/13/2019    ALT 14 11/13/2019    ALKPHOS 58 11/13/2019    EGFR 123 11/13/2019     VTE Pharmacologic Prophylaxis: Heparin  VTE Mechanical Prophylaxis: sequential compression device

## 2019-11-13 NOTE — UTILIZATION REVIEW
Initial Clinical Review    Elective  surgical procedure   91944  Age/Sex: 29 y o  female  Surgery Date: 11/12  Procedure: COLECTOMY ROBOTIC, right or subtotal (N/A), with terminal ileum  Ileocolic anastomosis  Laser fluorescence angiography  Anesthesia: general  Operative Findings: Tumor at hepatic flexure  No serosal invasion was noted  Admission Orders: Date/Time/Statement: Inpatient Admission Orders (From admission, onward)     Ordered        11/12/19 1922  Inpatient Admission  Once                   Orders Placed This Encounter   Procedures    Inpatient Admission     Standing Status:   Standing     Number of Occurrences:   1     Order Specific Question:   Admitting Physician     Answer:   Lieutenant Ty     Order Specific Question:   Level of Care     Answer:   Med Surg [16]     Order Specific Question:   Estimated length of stay     Answer:   Inpatient Only Surgery     Vital Signs: /78   Pulse 97   Temp 98 7 °F (37 1 °C)   Resp 15   Ht 5' 3" (1 6 m)   Wt 65 kg (143 lb 3 2 oz)   SpO2 96%   BMI 25 37 kg/m²   Diet: GI , lo fiber diet , no nuts   Mobility: amb pt   DVT Prophylaxis: SCD  Medications/Pain Control:   Scheduled Medications:    Medications:  ARIPiprazole 5 mg Oral Daily   enoxaparin 40 mg Subcutaneous Daily   FLUoxetine 20 mg Oral Daily   scopolamine 1 patch Transdermal Q72H     Continuous IV Infusions:     PRN Meds:    acetaminophen 650 mg Oral Q4H PRN   hydrOXYzine HCL 25 mg Oral Q6H PRN   lactated ringers 1,000 mL Intravenous Once PRN   And      lactated ringers 1,000 mL Intravenous Once PRN   methocarbamol 500 mg Oral Q8H PRN   morphine injection 2 mg Intravenous Q2H PRN x2   ondansetron 4 mg Intravenous Q6H PRN   oxyCODONE 5 mg Oral Q4H PRN   sodium chloride 1,000 mL Intravenous Once PRN   And      sodium chloride 1,000 mL Intravenous Once PRN       Network Utilization Review Department  [email or web address]  ATTENTION: Please call with any questions or concerns to [phone] and carefully listen to the prompts so that you are directed to the right person  All voicemails are confidential   Mirella Camp all requests for admission clinical reviews, approved or denied determinations and any other requests to dedicated fax number below belonging to the campus where the patient is receiving treatment    FACILITY NAME UR FAX NUMBER   ADMISSION DENIALS (Administrative/Medical Necessity) [address] [facility] (Maternity/NICU/Pediatrics) [phone]   [facility] [address] [place] [address] 300 [facility] [phone]   [address] [place] [address] [address] [place] [address] [phone]   [facility] 2000 [facility] [address] [place] [address] [phone]

## 2019-11-13 NOTE — PROGRESS NOTES
Patient complaining of sharp stabbing pain into her right shoulder  I explained to the patient that this could be gas pain traveling into her shoulder  Morphine IV given for breakthrough pain  Patient has ambulated around the unit 2 times today  I paged [facility] surgery for further input

## 2019-11-13 NOTE — SOCIAL WORK
Initial interview:     CM met with the patient to review the CM role and discuss possible dc needs  Pt lives with her two Dtrs (7 & 8) in a single story home in [place], [place]   6 RADHA  Pt is independent, unemployed and drives  Pt has a SPC, crutches, BSC, SC, reachers and sock aids  Pt uses a stall shower with no grab bars  No hx of VNA or IP rehab  Pt denied drug,  etoh or mental illness history  No POA or LW  Prescriptions are filled at [address]  Main contact:   JUANA LIRA  [place] [phone]  Sister Ronald Hernandez [phone]    JR Plan - pend med progression  Pt denied any dc needs at this time  CM reviewed d/c planning process including the following: identifying help at home, patient preference for d/c planning needs, Discharge Lounge, [facility] Meds to Bed program, availability of treatment team to discuss questions or concerns patient and/or family may have regarding understanding medications and recognizing signs and symptoms once discharged  CM also encouraged patient to follow up with all recommended appointments after discharge  Patient advised of importance for patient and family to participate in managing patients medical well being

## 2019-11-14 ENCOUNTER — DOCUMENTATION (OUTPATIENT)
Dept: HEMATOLOGY ONCOLOGY | Facility: CLINIC | Age: 34
End: 2019-11-14

## 2019-11-14 VITALS
HEART RATE: 84 BPM | RESPIRATION RATE: 16 BRPM | BODY MASS INDEX: 25.37 KG/M2 | OXYGEN SATURATION: 96 % | SYSTOLIC BLOOD PRESSURE: 115 MMHG | WEIGHT: 143.2 LBS | HEIGHT: 63 IN | DIASTOLIC BLOOD PRESSURE: 80 MMHG | TEMPERATURE: 98.3 F

## 2019-11-14 LAB
ANION GAP SERPL CALCULATED.3IONS-SCNC: 7 MMOL/L (ref 4–13)
BASOPHILS # BLD AUTO: 0.05 THOUSANDS/ΜL (ref 0–0.1)
BASOPHILS NFR BLD AUTO: 1 % (ref 0–1)
BUN SERPL-MCNC: 3 MG/DL (ref 5–25)
CALCIUM SERPL-MCNC: 8.1 MG/DL (ref 8.3–10.1)
CHLORIDE SERPL-SCNC: 109 MMOL/L (ref 100–108)
CO2 SERPL-SCNC: 25 MMOL/L (ref 21–32)
CREAT SERPL-MCNC: 0.49 MG/DL (ref 0.6–1.3)
EOSINOPHIL # BLD AUTO: 0.06 THOUSAND/ΜL (ref 0–0.61)
EOSINOPHIL NFR BLD AUTO: 1 % (ref 0–6)
ERYTHROCYTE [DISTWIDTH] IN BLOOD BY AUTOMATED COUNT: 13.8 % (ref 11.6–15.1)
GFR SERPL CREATININE-BSD FRML MDRD: 128 ML/MIN/1.73SQ M
GLUCOSE SERPL-MCNC: 98 MG/DL (ref 65–140)
HCT VFR BLD AUTO: 29.1 % (ref 34.8–46.1)
HGB BLD-MCNC: 9.3 G/DL (ref 11.5–15.4)
IMM GRANULOCYTES # BLD AUTO: 0.02 THOUSAND/UL (ref 0–0.2)
IMM GRANULOCYTES NFR BLD AUTO: 0 % (ref 0–2)
LYMPHOCYTES # BLD AUTO: 1.3 THOUSANDS/ΜL (ref 0.6–4.47)
LYMPHOCYTES NFR BLD AUTO: 16 % (ref 14–44)
MCH RBC QN AUTO: 28.4 PG (ref 26.8–34.3)
MCHC RBC AUTO-ENTMCNC: 32 G/DL (ref 31.4–37.4)
MCV RBC AUTO: 89 FL (ref 82–98)
MONOCYTES # BLD AUTO: 0.58 THOUSAND/ΜL (ref 0.17–1.22)
MONOCYTES NFR BLD AUTO: 7 % (ref 4–12)
NEUTROPHILS # BLD AUTO: 6.4 THOUSANDS/ΜL (ref 1.85–7.62)
NEUTS SEG NFR BLD AUTO: 75 % (ref 43–75)
NRBC BLD AUTO-RTO: 0 /100 WBCS
PLATELET # BLD AUTO: 233 THOUSANDS/UL (ref 149–390)
PMV BLD AUTO: 11.3 FL (ref 8.9–12.7)
POTASSIUM SERPL-SCNC: 3.3 MMOL/L (ref 3.5–5.3)
RBC # BLD AUTO: 3.27 MILLION/UL (ref 3.81–5.12)
SODIUM SERPL-SCNC: 141 MMOL/L (ref 136–145)
WBC # BLD AUTO: 8.41 THOUSAND/UL (ref 4.31–10.16)

## 2019-11-14 PROCEDURE — 80048 BASIC METABOLIC PNL TOTAL CA: CPT | Performed by: PHYSICIAN ASSISTANT

## 2019-11-14 PROCEDURE — 85025 COMPLETE CBC W/AUTO DIFF WBC: CPT | Performed by: PHYSICIAN ASSISTANT

## 2019-11-14 RX ORDER — OXYCODONE HYDROCHLORIDE 5 MG/1
5 TABLET ORAL EVERY 6 HOURS PRN
Qty: 30 TABLET | Refills: 0 | Status: SHIPPED | OUTPATIENT
Start: 2019-11-14 | End: 2019-11-24

## 2019-11-14 RX ORDER — ACETAMINOPHEN 325 MG/1
650 TABLET ORAL EVERY 4 HOURS PRN
Qty: 30 TABLET | Refills: 0 | Status: SHIPPED | OUTPATIENT
Start: 2019-11-14 | End: 2020-06-04

## 2019-11-14 RX ORDER — POTASSIUM CHLORIDE 20 MEQ/1
40 TABLET, EXTENDED RELEASE ORAL ONCE
Status: COMPLETED | OUTPATIENT
Start: 2019-11-14 | End: 2019-11-14

## 2019-11-14 RX ADMIN — ENOXAPARIN SODIUM 40 MG: 40 INJECTION SUBCUTANEOUS at 08:05

## 2019-11-14 RX ADMIN — ARIPIPRAZOLE 5 MG: 5 TABLET ORAL at 08:05

## 2019-11-14 RX ADMIN — POTASSIUM CHLORIDE 40 MEQ: 1500 TABLET, EXTENDED RELEASE ORAL at 09:35

## 2019-11-14 RX ADMIN — HYDROMORPHONE HYDROCHLORIDE 0.2 MG: 1 INJECTION, SOLUTION INTRAMUSCULAR; INTRAVENOUS; SUBCUTANEOUS at 03:02

## 2019-11-14 RX ADMIN — OXYCODONE HYDROCHLORIDE 5 MG: 5 TABLET ORAL at 07:38

## 2019-11-14 RX ADMIN — FLUOXETINE 20 MG: 20 CAPSULE ORAL at 08:05

## 2019-11-14 NOTE — DISCHARGE SUMMARY
Discharge Summary - Colorectal Surgery   Elizabeth Youngblood 29 y o  female MRN: [id number]  [facility] Encounter: [id number]        Admitting Diagnosis: Hepatic flexure cancer    Admit Date: 11/12/19    Discharge Diagnosis: Same    Discharge Date: 11/14/19    HPI: Eliceo Bowers is a pleasant 30 yo woman who presents to our office with  A hepatic flexure colonic lesion that pathology reveals at least a high grade dysplasia  The appearance on colonoscopy report shows it is vi circumferential and is large exophytic  Patient has a CEA of 3 4  Patient is now admitted for surgical intervention  Procedures Performed: 11/12/19 Robotic right subtotal colectomy with terminal ileum, ileocolic anastomosis, SPY angiography - Dr Ni Garcia: Patient has done extremely well post operatively  She was tolerating clear liquids the night following surgery  She has been up and OOB and ambulating the halls well on her own  She is now tolerating a low residue diet  Her pain is controlled with Oxycodone and tylenol  Patient is non distended, her incisional wounds are clean and dry  Patient will be discharged home today and followed in the office in 4 weeks  Sooner if needed  Oxycodone was E-scripted to her [address] in [place]  5 mg every 4-6 hours as needed for pain  # 30 dispensed and no refills  Pathology pending    Complications: None      Condition at Discharge: good     Discharge instructions/Information to patient and family:   See after visit summary for information provided to patient and family  Provisions for Follow-Up Care:  See after visit summary for information related to follow-up care and any pertinent home health orders  Disposition: Home    Planned Readmission: No    Discharge Statement   I spent 20 minutes discharging the patient  This time was spent on the day of discharge  I had direct contact with the patient on the day of discharge   Additional documentation is required if more than 30 minutes were spent on discharge  Discharge Medications:  See after visit summary for reconciled discharge medications provided to patient and family

## 2019-11-14 NOTE — PLAN OF CARE
Problem: PAIN - ADULT  Goal: Verbalizes/displays adequate comfort level or baseline comfort level  Description  Interventions:  - Encourage patient to monitor pain and request assistance  - Assess pain using appropriate pain scale  - Administer analgesics based on type and severity of pain and evaluate response  - Implement non-pharmacological measures as appropriate and evaluate response  - Consider cultural and social influences on pain and pain management  - Notify physician/advanced practitioner if interventions unsuccessful or patient reports new pain  Outcome: Progressing     Problem: INFECTION - ADULT  Goal: Absence or prevention of progression during hospitalization  Description  INTERVENTIONS:  - Assess and monitor for signs and symptoms of infection  - Monitor lab/diagnostic results  - Monitor all insertion sites, i e  indwelling lines, tubes, and drains  - Monitor endotracheal if appropriate and nasal secretions for changes in amount and color  - [place] appropriate cooling/warming therapies per order  - Administer medications as ordered  - Instruct and encourage patient and family to use good hand hygiene technique  - Identify and instruct in appropriate isolation precautions for identified infection/condition  Outcome: Progressing  Goal: Absence of fever/infection during neutropenic period  Description  INTERVENTIONS:  - Monitor WBC    Outcome: Progressing     Problem: SAFETY ADULT  Goal: Patient will remain free of falls  Description  INTERVENTIONS:  - Assess patient frequently for physical needs  -  Identify cognitive and physical deficits and behaviors that affect risk of falls    -  [place] fall precautions as indicated by assessment   - Educate patient/family on patient safety including physical limitations  - Instruct patient to call for assistance with activity based on assessment  - Modify environment to reduce risk of injury  - Consider OT/PT consult to assist with strengthening/mobility  Outcome: Progressing  Goal: Maintain or return to baseline ADL function  Description  INTERVENTIONS:  -  Assess patient's ability to carry out ADLs; assess patient's baseline for ADL function and identify physical deficits which impact ability to perform ADLs (bathing, care of mouth/teeth, toileting, grooming, dressing, etc )  - Assess/evaluate cause of self-care deficits   - Assess range of motion  - Assess patient's mobility; develop plan if impaired  - Assess patient's need for assistive devices and provide as appropriate  - Encourage maximum independence but intervene and supervise when necessary  - Involve family in performance of ADLs  - Assess for home care needs following discharge   - Consider OT consult to assist with ADL evaluation and planning for discharge  - Provide patient education as appropriate  Outcome: Progressing  Goal: Maintain or return mobility status to optimal level  Description  INTERVENTIONS:  - Assess patient's baseline mobility status (ambulation, transfers, stairs, etc )    - Identify cognitive and physical deficits and behaviors that affect mobility  - Identify mobility aids required to assist with transfers and/or ambulation (gait belt, sit-to-stand, lift, walker, cane, etc )  - [place] fall precautions as indicated by assessment  - Record patient progress and toleration of activity level on Mobility SBAR; progress patient to next Phase/Stage  - Instruct patient to call for assistance with activity based on assessment  - Consider rehabilitation consult to assist with strengthening/weightbearing, etc   Outcome: Progressing     Problem: DISCHARGE PLANNING  Goal: Discharge to home or other facility with appropriate resources  Description  INTERVENTIONS:  - Identify barriers to discharge w/patient and caregiver  - Arrange for needed discharge resources and transportation as appropriate  - Identify discharge learning needs (meds, wound care, etc )  - Arrange for interpretive services to assist at discharge as needed  - Refer to Case Management Department for coordinating discharge planning if the patient needs post-hospital services based on physician/advanced practitioner order or complex needs related to functional status, cognitive ability, or social support system  Outcome: Progressing     Problem: Knowledge Deficit  Goal: Patient/family/caregiver demonstrates understanding of disease process, treatment plan, medications, and discharge instructions  Description  Complete learning assessment and assess knowledge base    Interventions:  - Provide teaching at level of understanding  - Provide teaching via preferred learning methods  Outcome: Progressing

## 2019-11-14 NOTE — PROGRESS NOTES
Progress Note - Colorecta Surgery   Elizabeth Youngblood 29 y o  female MRN: [id number]  Unit/Bed#: [facility] 828-01 Encounter: [id number]    Assessment:  34F w/ hepatic flexure cancer, now s/p Robotic R/subtotal colectomy w/ ileocolic anastamosis on 89/71    + flatus    Plan:  Continue low residue diet  OOB, ambulation  PRN pain control w/ oral agents  Continue home meds  Possible DC home today - if patient is able to get off IV pain meds    Subjective/Objective   Chief Complaint:     Subjective: No complaints, pain relatively well controlled however still taking IV pain meds  Tolerating PO, no N/V, took in 680  + Flatus  Objective:     Blood pressure 126/79, pulse 96, temperature 98 1 °F (36 7 °C), resp  rate 16, height 5' 3" (1 6 m), weight 65 kg (143 lb 3 2 oz), SpO2 97 %  ,Body mass index is 25 37 kg/m²        Intake/Output Summary (Last 24 hours) at 11/14/2019 0540  Last data filed at 11/14/2019 0503  Gross per 24 hour   Intake 930 ml   Output 4200 ml   Net -3270 ml       Invasive Devices     Peripheral Intravenous Line            Peripheral IV 11/12/19 Left Hand 1 day    Peripheral IV 11/12/19 Right Arm 1 day                Physical Exam:   Gen: A&O, NAD  Cardio: RRR  Lungs: CTAB  Abd: Soft, non distended, mildly tender      Lab, Imaging and other studies:  CBC:   Lab Results   Component Value Date    WBC 8 41 11/14/2019    HGB 9 3 (L) 11/14/2019    HCT 29 1 (L) 11/14/2019    MCV 89 11/14/2019     11/14/2019    MCH 28 4 11/14/2019    MCHC 32 0 11/14/2019    RDW 13 8 11/14/2019    MPV 11 3 11/14/2019    NRBC 0 11/14/2019   , CMP: No results found for: SODIUM, K, CL, CO2, ANIONGAP, BUN, CREATININE, GLUCOSE, CALCIUM, AST, ALT, ALKPHOS, PROT, BILITOT, EGFR, Coagulation: No results found for: PT, INR, APTT  VTE Pharmacologic Prophylaxis: Heparin  VTE Mechanical Prophylaxis: sequential compression device

## 2019-11-14 NOTE — PROGRESS NOTES
Rectal/GI Multidisciplinary Conference: 11/14/19    Diagnosis: Colon Cancer     Diana Caba was presented at the Rectal/GI Multidisciplinary Conference today       Physician Recommended Plan:     - Awaiting final pathology for further treatment decision  - Referral to genetics has been completed  - Follow up with Dr Siva Shultz post-op    Patient has a consultation with Dr Ambar Ramsey scheduled on 12/6/19

## 2019-11-18 NOTE — UTILIZATION REVIEW
Notification of Discharge  This is a Notification of Discharge from our facility [address]  Please be advised that this patient has been discharge from our facility  Below you will find the admission and discharge date and time including the patients disposition  PRESENTATION DATE: 11/12/2019 11:15 AM    IP ADMISSION DATE: 11/12/19 1922   DISCHARGE DATE: 11/14/2019 11:00 AM  DISPOSITION: Home/Self Care Home/Self Care   Admission Orders listed below:  Admission Orders (From admission, onward)     Ordered        11/12/19 1922  Inpatient Admission  Once                   Please contact the UR Department if additional information is required to close this patient's authorization/case  Elvin Bourne  [employer] Utilization Review Department  Main: [phone] x carefully listen to the prompts  All voicemails are confidential   [email or web address]  Send all requests for admission clinical reviews, approved or denied determinations and any other requests to dedicated fax number below belonging to the campus where the patient is receiving treatment    List of dedicated fax numbers:  [address] DENIALS (Administrative/Medical Necessity) [phone]   [address] (Maternity/NICU/Pediatrics) [phone]   Simi Marquez [phone]   Kaitlin Alvarado [phone]   [facility] [phone]   [address]  [facility] 15[address] [facility] [phone]   Mylinda Reveal 2000 [facility] [address] [place] [address] [phone]

## 2019-11-20 ENCOUNTER — TELEPHONE (OUTPATIENT)
Dept: GYNECOLOGIC ONCOLOGY | Facility: HOSPITAL | Age: 34
End: 2019-11-20

## 2019-11-20 NOTE — TELEPHONE ENCOUNTER
Patient notified of genetic testing results  [facility] Colorectal panel wild-type  Results mailed to patient and scanned into Epic

## 2019-12-06 ENCOUNTER — TELEPHONE (OUTPATIENT)
Dept: HEMATOLOGY ONCOLOGY | Facility: CLINIC | Age: 34
End: 2019-12-06

## 2019-12-06 NOTE — TELEPHONE ENCOUNTER
Patient canceled appointment for todat at 3 with Dr Edd Cuevas, said she can not drive that far in the snow, please call her back to reschedule  She is a new patient and was scheduled for a consult   Please call her back on [phone]

## 2020-01-14 ENCOUNTER — CONSULT (OUTPATIENT)
Dept: HEMATOLOGY ONCOLOGY | Facility: CLINIC | Age: 35
End: 2020-01-14
Payer: COMMERCIAL

## 2020-01-14 VITALS
BODY MASS INDEX: 27.05 KG/M2 | TEMPERATURE: 99.5 F | WEIGHT: 147 LBS | DIASTOLIC BLOOD PRESSURE: 62 MMHG | RESPIRATION RATE: 16 BRPM | HEART RATE: 102 BPM | SYSTOLIC BLOOD PRESSURE: 112 MMHG | OXYGEN SATURATION: 99 % | HEIGHT: 62 IN

## 2020-01-14 DIAGNOSIS — C18.3 CANCER OF HEPATIC FLEXURE (HCC): Primary | ICD-10-CM

## 2020-01-14 PROCEDURE — 99214 OFFICE O/P EST MOD 30 MIN: CPT | Performed by: INTERNAL MEDICINE
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
## 2020-01-16 ENCOUNTER — TELEPHONE (OUTPATIENT)
Dept: HEMATOLOGY ONCOLOGY | Facility: CLINIC | Age: 35
End: 2020-01-16

## 2020-01-16 NOTE — TELEPHONE ENCOUNTER
Received call from patient with c/o bloody stool and abdominal cramping last night  Patient reports that this has happened 4 or 5 times since her colon surgery in November  She tells me she took a lotronex last night and that seemed to help  Her stools are sometimes diarrhea, sometimes with mucous, sometimes formed  She has no c/o nausea or vomitting  I relayed this information to Dr Siva Shultz who has advised she see him in the office for these issues  I called Robby Castellanos back and reiterated the same  She has Dr Kartik Cotto office number and will make an appointment

## 2020-01-20 ENCOUNTER — OFFICE VISIT (OUTPATIENT)
Dept: GASTROENTEROLOGY | Facility: CLINIC | Age: 35
End: 2020-01-20
Payer: COMMERCIAL

## 2020-01-20 VITALS
SYSTOLIC BLOOD PRESSURE: 124 MMHG | HEIGHT: 62 IN | WEIGHT: 147.6 LBS | BODY MASS INDEX: 27.16 KG/M2 | HEART RATE: 90 BPM | DIASTOLIC BLOOD PRESSURE: 84 MMHG

## 2020-01-20 DIAGNOSIS — K62.5 GASTROINTESTINAL HEMORRHAGE ASSOCIATED WITH ANORECTAL SOURCE: ICD-10-CM

## 2020-01-20 DIAGNOSIS — R10.84 GENERALIZED ABDOMINAL PAIN: Primary | ICD-10-CM

## 2020-01-20 PROCEDURE — 99213 OFFICE O/P EST LOW 20 MIN: CPT | Performed by: PHYSICIAN ASSISTANT

## 2020-01-20 RX ORDER — ALOSETRON HYDROCHLORIDE 1 MG/1
1 TABLET, FILM COATED ORAL 2 TIMES DAILY
COMMUNITY
End: 2020-06-04

## 2020-01-20 RX ORDER — CHOLESTYRAMINE 4 G/9G
1 POWDER, FOR SUSPENSION ORAL
Qty: 90 PACKET | Refills: 2 | Status: SHIPPED | OUTPATIENT
Start: 2020-01-20 | End: 2020-06-04

## 2020-01-20 NOTE — PROGRESS NOTES
[facility] - Outpatient Follow-up Note  Monse Luis 29 y o  female MRN: [id number]  Encounter: [id number]          ASSESSMENT AND PLAN:      1  Generalized abdominal pain  2  Gastrointestinal hemorrhage associated with anorectal source    Diagnosed with cancer at the hepatic flexure in October s/p resection with Dr Sisto Siemens in November  Tumor was T3 N0; no adjuvent chemotherapy was required  Over the past 2 weeks the reports increasingly loose stools with blood    Stop Lotronex  Will plan colonoscopy   F/U with Dr Sisto Siemens as scheduled  Check labs    ______________________________________________________________________    SUBJECTIVE:  28-year-old female with a recently diagnosed colon cancer presents for evaluation of loose stools and rectal bleeding  She was diagnosed with a tumor at the hepatic flexure via colonoscopy in October  She was referred to Dr Christiana Brooks who performed a hemicolectomy in November  She had a primary anastomosis performed  She recovered well  The tumor was found to be T3 N0 and so no adjuvant chemotherapy was required  Over the past 2 weeks she has noted increasingly loose stools, abdominal pain and rectal bleeding  She states that the blood is not profuse  It actually appears to look like red flakes mid with mucus  She has a history of irritable bowel syndrome and had been prescribed Lotronex in the past   She reports that she started taking this again over the past few days  The bleeding started before she was taking Lotronex  Even by taking Lotronex she is still having 3-4 stools a day  REVIEW OF SYSTEMS IS OTHERWISE NEGATIVE        Historical Information   Past Medical History:   Diagnosis Date    Anxiety     Cancer ([facility] [address] )     colon    Depression     Irritable bowel syndrome      Past Surgical History:   Procedure Laterality Date    COLONOSCOPY      HEMORRHOID SURGERY      [place] LAP,SURG,COLECTOMY, PARTIAL, W/ANAST N/A 11/12/2019    Procedure: ROBOTIC ASSISTED ILEOCOLECTOMY;  Surgeon: Claudell Hoa, MD;  Location: [facility];  Service: Robotics     Social History   Social History     Substance and Sexual Activity   Alcohol Use Yes    Alcohol/week: 1 0 standard drinks    Types: 1 Glasses of wine per week    Frequency: 2-4 times a month     Social History     Substance and Sexual Activity   Drug Use Yes    Types: Marijuana    Comment: medical -not used since wednesday     Social History     Tobacco Use   Smoking Status Never Smoker   Smokeless Tobacco Never Used     Family History   Problem Relation Age of Onset    Cancer Mother     Thyroid cancer Mother     Diabetes Father     Heart disease Father     Colon polyps Maternal Uncle     Breast cancer Maternal Grandmother     Pancreatic cancer Paternal Grandfather     Colon polyps Paternal Aunt     Colon cancer Neg Hx        Meds/Allergies       Current Outpatient Medications:     acetaminophen (TYLENOL) 325 mg tablet    albuterol (2 5 mg/3 mL) 0 083 % nebulizer solution    alosetron (LOTRONEX) 1 MG tablet    ALPRAZolam (XANAX) 0 25 mg tablet    ARIPiprazole (ABILIFY) 5 mg tablet    FLUoxetine (PROzac) 20 mg capsule    hydrOXYzine HCL (ATARAX) 25 mg tablet    methocarbamol (ROBAXIN) 500 mg tablet    cholestyramine (QUESTRAN) 4 g packet    Allergies   Allergen Reactions    Nuts      GI distress    Peanut-Containing Drug Products      GI distress    Peanut-Containing Drug Products            Objective     Blood pressure 124/84, pulse 90, height 5' 2" (1 575 m), weight 67 kg (147 lb 9 6 oz)  Body mass index is 27 kg/m²  PHYSICAL EXAM:      General Appearance:   Alert, cooperative, no distress   HEENT:   Normocephalic, atraumatic, anicteric      Neck:  Supple, symmetrical, trachea midline   Lungs:   Clear to auscultation bilaterally; no rales, rhonchi or wheezing; respirations unlabored    Heart[de-identified]   Regular rate and rhythm; no murmur, rub, or gallop     Abdomen:   Soft, non-tender, non-distended; normal bowel sounds; no masses, no organomegaly    Genitalia:   Deferred    Rectal:   Deferred    Extremities:  No cyanosis, clubbing or edema    Pulses:  2+ and symmetric    Skin:  No jaundice, rashes, or lesions    Lymph nodes:  No palpable cervical lymphadenopathy        Lab Results:   No visits with results within 1 Day(s) from this visit     Latest known visit with results is:   Admission on 11/12/2019, Discharged on 11/14/2019   Component Date Value    EXT Preg Test, Ur 11/12/2019 Negative     Control 11/12/2019 Valid     WBC 11/12/2019 9 42     RBC 11/12/2019 3 84     Hemoglobin 11/12/2019 11 1*    Hematocrit 11/12/2019 33 9*    MCV 11/12/2019 88     MCH 11/12/2019 28 9     MCHC 11/12/2019 32 7     RDW 11/12/2019 13 7     MPV 11/12/2019 10 8     Platelets 38/28/0343 296     nRBC 11/12/2019 0     Neutrophils Relative 11/12/2019 70     Immat GRANS % 11/12/2019 0     Lymphocytes Relative 11/12/2019 21     Monocytes Relative 11/12/2019 6     Eosinophils Relative 11/12/2019 2     Basophils Relative 11/12/2019 1     Neutrophils Absolute 11/12/2019 6 57     Immature Grans Absolute 11/12/2019 0 03     Lymphocytes Absolute 11/12/2019 1 97     Monocytes Absolute 11/12/2019 0 53     Eosinophils Absolute 11/12/2019 0 23     Basophils Absolute 11/12/2019 0 09     Sodium 11/12/2019 141     Potassium 11/12/2019 3 7     Chloride 11/12/2019 110*    CO2 11/12/2019 23     ANION GAP 11/12/2019 8     BUN 11/12/2019 8     Creatinine 11/12/2019 0 64     Glucose 11/12/2019 92     Calcium 11/12/2019 8 8     AST 11/12/2019 8     ALT 11/12/2019 15     Alkaline Phosphatase 11/12/2019 74     Total Protein 11/12/2019 7 5     Albumin 11/12/2019 3 8     Total Bilirubin 11/12/2019 0 24     eGFR 11/12/2019 117     Case Report 11/12/2019                      Value:Surgical Pathology Report                         Case: [id number]                                   Authorizing Provider:  Dominga Gonzalez Maliha Conner MD       Collected:           11/12/2019 1818              Ordering Location:     [address] [place] [address] Road      Received:            11/12/2019 [facility]                                                      Pathologist:           Nini Claire MD                                                                 Specimen:    Large Intestine, NOS, ileocolectomy                                                        Addendum 11/12/2019                      Value: This result contains rich text formatting which cannot be displayed here   Final Diagnosis 11/12/2019                      Value: This result contains rich text formatting which cannot be displayed here   Additional Information 11/12/2019                      Value: This result contains rich text formatting which cannot be displayed here      Synoptic Checklist 11/12/2019                      Value:COLON AND RECTUM: Resection, Including Transanal Disk Excision of Rectal Neoplasms  (ColoRectal - All Specimens)                                                        8th Edition - Protocol posted: 2/27/2019                                                        SPECIMEN                               Procedure:    Right hemicolectomy                                                         TUMOR                               Tumor Site:    Hepatic flexure                                Histologic Type:    Adenocarcinoma                                Histologic Grade:    G2: Moderately differentiated                                Tumor Size:    Greatest dimension (Centimeters): 4 5 cm                                 Additional Dimension (Centimeters):    4 3 cm                                 Additional Dimension (Centimeters):    1 6 cm                               Tumor Deposits:    Not identified                                Tumor Extension:    Tumor invades through the muscularis propria into pericolorectal tissue                                Macroscopic Tumor Perforation:    Not identified                                Lymphovascular Invasion:    Present                                  :    Large vessel (venous) invasion                                    :    Intramural                                Perineural Invasion:    Not identified                                Type of Polyp in Which Invasive Carcinoma Arose:    None identified                                Treatment Effect:    No known presurgical therapy                                                         MARGINS                               Margins: All margins are uninvolved by invasive carcinoma, high-grade dysplasia, intramucosal adenocarcinoma, and adenoma                                  Margins Examined:    Proximal                                  Margins Examined:    Distal                                  Margins Examined:    Radial or Mesenteric                                  Distance of Invasive Carcinoma from Closest Margin:    4 1 cm                                 Closest Margin:    Radial or Mesenteric                                                         LYMPH NODES                               Number of Lymph Nodes Involved:    0                                Number of Lymph Nodes Examined:    31                                                         PATHOLOGIC STAGE CLASSIFICATION (pTNM, AJCC 8th Edition)                               Primary Tumor (pT):    pT3                                Regional Lymph Nodes (pN):    pN0                                                         ADDITIONAL FINDINGS                               Additional Pathologic Findings:    None identified       Gross Description 11/12/2019                      Value: This result contains rich text formatting which cannot be displayed here      Sodium 11/13/2019 142     Potassium 11/13/2019 3 4*    Chloride 11/13/2019 112*    CO2 11/13/2019 20*    ANION GAP 11/13/2019 10     BUN 11/13/2019 4*    Creatinine 11/13/2019 0 55*    Glucose 11/13/2019 128     Calcium 11/13/2019 7 9*    AST 11/13/2019 11     ALT 11/13/2019 14     Alkaline Phosphatase 11/13/2019 58     Total Protein 11/13/2019 6 3*    Albumin 11/13/2019 3 3*    Total Bilirubin 11/13/2019 0 29     eGFR 11/13/2019 123     WBC 11/13/2019 13 49*    RBC 11/13/2019 3 41*    Hemoglobin 11/13/2019 9 9*    Hematocrit 11/13/2019 30 7*    MCV 11/13/2019 90     MCH 11/13/2019 29 0     MCHC 11/13/2019 32 2     RDW 11/13/2019 13 4     Platelets 50/18/5207 250     MPV 11/13/2019 11 9     WBC 11/14/2019 8 41     RBC 11/14/2019 3 27*    Hemoglobin 11/14/2019 9 3*    Hematocrit 11/14/2019 29 1*    MCV 11/14/2019 89     MCH 11/14/2019 28 4     MCHC 11/14/2019 32 0     RDW 11/14/2019 13 8     MPV 11/14/2019 11 3     Platelets 74/16/7065 233     nRBC 11/14/2019 0     Neutrophils Relative 11/14/2019 75     Immat GRANS % 11/14/2019 0     Lymphocytes Relative 11/14/2019 16     Monocytes Relative 11/14/2019 7     Eosinophils Relative 11/14/2019 1     Basophils Relative 11/14/2019 1     Neutrophils Absolute 11/14/2019 6 40     Immature Grans Absolute 11/14/2019 0 02     Lymphocytes Absolute 11/14/2019 1 30     Monocytes Absolute 11/14/2019 0 58     Eosinophils Absolute 11/14/2019 0 06     Basophils Absolute 11/14/2019 0 05     Sodium 11/14/2019 141     Potassium 11/14/2019 3 3*    Chloride 11/14/2019 109*    CO2 11/14/2019 25     ANION GAP 11/14/2019 7     BUN 11/14/2019 3*    Creatinine 11/14/2019 0 49*    Glucose 11/14/2019 98     Calcium 11/14/2019 8 1*    eGFR 11/14/2019 128          Radiology Results:   No results found

## 2020-02-04 NOTE — TELEPHONE ENCOUNTER
Spoke with Bridget Cunningham to see how she is feeling and if she continues to have bloody stools  She states that she saw her GI doctor near her home and is scheduled for colonoscopy this week  I offered to set up an office visit with Dr Ariana Zuñiga sooner than her scheduled appointment in March and she declined at this time  She states that she will keep her March OV  I asked that she have a copy of the  result from her colonoscopy sent to Dr Ariana Zuñiga as well  She was agreeable

## 2020-02-06 ENCOUNTER — PREP FOR PROCEDURE (OUTPATIENT)
Dept: GASTROENTEROLOGY | Facility: CLINIC | Age: 35
End: 2020-02-06

## 2020-02-06 DIAGNOSIS — R19.7 DIARRHEA, UNSPECIFIED TYPE: Primary | ICD-10-CM

## 2020-02-07 ENCOUNTER — HOSPITAL ENCOUNTER (OUTPATIENT)
Dept: GASTROENTEROLOGY | Facility: HOSPITAL | Age: 35
Setting detail: OUTPATIENT SURGERY
Discharge: HOME | End: 2020-02-07
Attending: INTERNAL MEDICINE | Admitting: INTERNAL MEDICINE
Payer: COMMERCIAL

## 2020-02-07 ENCOUNTER — ANESTHESIA EVENT (OUTPATIENT)
Dept: GASTROENTEROLOGY | Facility: HOSPITAL | Age: 35
End: 2020-02-07

## 2020-02-07 ENCOUNTER — ANESTHESIA (OUTPATIENT)
Dept: GASTROENTEROLOGY | Facility: HOSPITAL | Age: 35
End: 2020-02-07

## 2020-02-07 VITALS
HEIGHT: 62 IN | WEIGHT: 146.39 LBS | DIASTOLIC BLOOD PRESSURE: 75 MMHG | SYSTOLIC BLOOD PRESSURE: 119 MMHG | BODY MASS INDEX: 26.94 KG/M2 | RESPIRATION RATE: 16 BRPM | OXYGEN SATURATION: 100 % | HEART RATE: 80 BPM | TEMPERATURE: 97.8 F

## 2020-02-07 DIAGNOSIS — R19.7 DIARRHEA, UNSPECIFIED TYPE: ICD-10-CM

## 2020-02-07 LAB
EXT PREGNANCY TEST URINE: NEGATIVE
EXT. CONTROL: NORMAL

## 2020-02-07 PROCEDURE — 45385 COLONOSCOPY W/LESION REMOVAL: CPT | Performed by: INTERNAL MEDICINE

## 2020-02-07 PROCEDURE — 81025 URINE PREGNANCY TEST: CPT | Performed by: STUDENT IN AN ORGANIZED HEALTH CARE EDUCATION/TRAINING PROGRAM

## 2020-02-07 PROCEDURE — 88305 TISSUE EXAM BY PATHOLOGIST: CPT | Performed by: PATHOLOGY

## 2020-02-07 RX ORDER — LIDOCAINE HYDROCHLORIDE 10 MG/ML
INJECTION, SOLUTION EPIDURAL; INFILTRATION; INTRACAUDAL; PERINEURAL AS NEEDED
Status: DISCONTINUED | OUTPATIENT
Start: 2020-02-07 | End: 2020-02-07 | Stop reason: SURG

## 2020-02-07 RX ORDER — SODIUM CHLORIDE, SODIUM LACTATE, POTASSIUM CHLORIDE, CALCIUM CHLORIDE 600; 310; 30; 20 MG/100ML; MG/100ML; MG/100ML; MG/100ML
125 INJECTION, SOLUTION INTRAVENOUS CONTINUOUS
Status: DISCONTINUED | OUTPATIENT
Start: 2020-02-07 | End: 2020-02-11 | Stop reason: HOSPADM

## 2020-02-07 RX ORDER — SODIUM CHLORIDE, SODIUM LACTATE, POTASSIUM CHLORIDE, CALCIUM CHLORIDE 600; 310; 30; 20 MG/100ML; MG/100ML; MG/100ML; MG/100ML
INJECTION, SOLUTION INTRAVENOUS CONTINUOUS PRN
Status: DISCONTINUED | OUTPATIENT
Start: 2020-02-07 | End: 2020-02-07 | Stop reason: SURG

## 2020-02-07 RX ORDER — PROPOFOL 10 MG/ML
INJECTION, EMULSION INTRAVENOUS AS NEEDED
Status: DISCONTINUED | OUTPATIENT
Start: 2020-02-07 | End: 2020-02-07 | Stop reason: SURG

## 2020-02-07 RX ADMIN — PROPOFOL 100 MG: 10 INJECTION, EMULSION INTRAVENOUS at 13:31

## 2020-02-07 RX ADMIN — PROPOFOL 50 MG: 10 INJECTION, EMULSION INTRAVENOUS at 13:37

## 2020-02-07 RX ADMIN — PROPOFOL 30 MG: 10 INJECTION, EMULSION INTRAVENOUS at 13:33

## 2020-02-07 RX ADMIN — LIDOCAINE HYDROCHLORIDE 50 MG: 10 INJECTION, SOLUTION EPIDURAL; INFILTRATION; INTRACAUDAL; PERINEURAL at 13:30

## 2020-02-07 RX ADMIN — SODIUM CHLORIDE, SODIUM LACTATE, POTASSIUM CHLORIDE, AND CALCIUM CHLORIDE: .6; .31; .03; .02 INJECTION, SOLUTION INTRAVENOUS at 13:10

## 2020-02-07 RX ADMIN — PROPOFOL 20 MG: 10 INJECTION, EMULSION INTRAVENOUS at 13:35

## 2020-02-07 RX ADMIN — PROPOFOL 20 MG: 10 INJECTION, EMULSION INTRAVENOUS at 13:39

## 2020-02-07 NOTE — ANESTHESIA PREPROCEDURE EVALUATION
Review of Systems/Medical History  Patient summary reviewed  Chart reviewed  No history of anesthetic complications     Cardiovascular  Exercise tolerance (METS): >4,     Pulmonary       GI/Hepatic    GI malignancy, Bowel prep            Endo/Other     GYN       Hematology   Musculoskeletal       Neurology   Psychology   Anxiety, Depression , being treated for depression,              Physical Exam    Airway    Mallampati score: II  TM Distance: >3 FB  Neck ROM: full     Dental   No notable dental hx     Cardiovascular      Pulmonary      Other Findings        Anesthesia Plan  ASA Score- 2     Anesthesia Type- IV sedation with anesthesia with ASA Monitors  Additional Monitors:   Airway Plan:         Plan Factors-    Induction- intravenous  Postoperative Plan-     Informed Consent- Anesthetic plan and risks discussed with patient  I personally reviewed this patient with the CRNA  Discussed and agreed on the Anesthesia Plan with the CRNA  Alyssa Bishop

## 2020-02-07 NOTE — DISCHARGE INSTRUCTIONS
Colonoscopy   WHAT YOU NEED TO KNOW:   A colonoscopy is a procedure to examine the inside of your colon (intestine) with a scope  Polyps or tissue growths may have been removed during your colonoscopy  It is normal to feel bloated and to have some abdominal discomfort  You should be passing gas  If you have hemorrhoids or you had polyps removed, you may have a small amount of bleeding  DISCHARGE INSTRUCTIONS:   Seek care immediately if:   · You have a large amount of bright red blood in your bowel movements  · Your abdomen is hard and firm and you have severe pain  · You have sudden trouble breathing  Contact your healthcare provider if:   · You develop a rash or hives  · You have a fever within 24 hours of your procedure  · You have not had a bowel movement for 3 days after your procedure  · You have questions or concerns about your condition or care  Activity:   · Do not lift, strain, or run  for 3 days after your procedure  · Rest after your procedure  You have been given medicine to relax you  Do not  drive or make important decisions until the day after your procedure  Return to your normal activity as directed  · Relieve gas and discomfort from bloating  by lying on your right side with a heating pad on your abdomen  You may need to take short walks to help the gas move out  Eat small meals until bloating is relieved  If you had polyps removed: For 7 days after your procedure:  · Do not  take aspirin  · Do not  go on long car rides  Help prevent constipation:   · Eat a variety of healthy foods  Healthy foods include fruit, vegetables, whole-grain breads, low-fat dairy products, beans, lean meat, and fish  Ask if you need to be on a special diet  Your healthcare provider may recommend that you eat high-fiber foods such as cooked beans  Fiber helps you have regular bowel movements  · Drink liquids as directed    Adults should drink between 9 and 13 eight-ounce cups of liquid every day  Ask what amount is best for you  For most people, good liquids to drink are water, juice, and milk  · Exercise as directed  Talk to your healthcare provider about the best exercise plan for you  Exercise can help prevent constipation, decrease your blood pressure and improve your health  Follow up with your healthcare provider as directed:  Write down your questions so you remember to ask them during your visits  © 2017 [address] [employer] Information is for End User's use only and may not be sold, redistributed or otherwise used for commercial purposes  All illustrations and images included in CareNotes® are the copyrighted property of [employer]  or Dillon Alex[employer]  The above information is an  only  It is not intended as medical advice for individual conditions or treatments  Talk to your doctor, nurse or pharmacist before following any medical regimen to see if it is safe and effective for you

## 2020-02-07 NOTE — ANESTHESIA POSTPROCEDURE EVALUATION
Post-Op Assessment Note    CV Status:  Stable  Pain Score: 0    Pain management: adequate     Mental Status:  Awake   Hydration Status:  Stable   PONV Controlled:  Controlled   Airway Patency:  Patent   Post Op Vitals Reviewed: Yes      Staff: CRNA           BP   103/61   Temp      Pulse  83   Resp   14   SpO2   100

## 2020-02-07 NOTE — H&P
History and Physical - [facility]  Abdelrahman Manning 29 y o  female MRN: [id number]      HPI: Abdelrahman Manning is a 29y o  year old female who presents for follow-up for personal history of colon cancer resected in November 2019, rectal bleeding      REVIEW OF SYSTEMS: Per the HPI, and otherwise unremarkable  Historical Information   Past Medical History:   Diagnosis Date    Anxiety     Cancer ([address] )     colon    Depression     Irritable bowel syndrome      Past Surgical History:   Procedure Laterality Date    COLONOSCOPY      HEMORRHOID SURGERY      [place] LAP,SURG,COLECTOMY, PARTIAL, W/ANAST N/A 11/12/2019    Procedure: ROBOTIC ASSISTED ILEOCOLECTOMY;  Surgeon: Galina Walters MD;  Location: [facility];  Service: Robotics     Social History   Social History     Substance and Sexual Activity   Alcohol Use Yes    Alcohol/week: 1 0 standard drinks    Types: 1 Glasses of wine per week    Frequency: 2-4 times a month     Social History     Substance and Sexual Activity   Drug Use Yes    Types: Marijuana    Comment: medical -not used since wednesday     Social History     Tobacco Use   Smoking Status Never Smoker   Smokeless Tobacco Never Used     Family History   Problem Relation Age of Onset    Cancer Mother     Thyroid cancer Mother     Diabetes Father     Heart disease Father     Colon polyps Maternal Uncle     Breast cancer Maternal Grandmother     Pancreatic cancer Paternal Grandfather     Colon polyps Paternal Aunt     Colon cancer Neg Hx        Meds/Allergies       (Not in a hospital admission)    Allergies   Allergen Reactions    Nuts      GI distress    Peanut-Containing Drug Products      GI distress    Peanut-Containing Drug Products        Objective     There were no vitals taken for this visit        PHYSICAL EXAM    Gen: NAD  CV: RRR  CHEST: Clear  ABD: soft, NT/ND  EXT: no edema      ASSESSMENT/PLAN:  This is a 29y o  year old female here for colonoscopy, and she is stable and optimized for her procedure

## 2020-03-10 ENCOUNTER — DOCUMENTATION (OUTPATIENT)
Dept: INFUSION CENTER | Facility: CLINIC | Age: 35
End: 2020-03-10

## 2020-03-10 NOTE — SOCIAL WORK
MSW reviewed pt's DT, completed in [facility], which she self scored as a 5 and listed concerns including depression, fear, nervousness, sadness, worry, loss of interest in activities, and 4 physical symptoms  Upon review of pt's chart, it is noted that she is not having further treatment following her surgery and will be seen for follow up and observation only  No MSW interventions are warranted at this time, MSW will remain available to pt moving forward should her needs change

## 2020-03-30 ENCOUNTER — PATIENT OUTREACH (OUTPATIENT)
Dept: HEMATOLOGY ONCOLOGY | Facility: CLINIC | Age: 35
End: 2020-03-30

## 2020-05-19 ENCOUNTER — APPOINTMENT (OUTPATIENT)
Dept: LAB | Facility: CLINIC | Age: 35
End: 2020-05-19
Payer: COMMERCIAL

## 2020-05-19 DIAGNOSIS — C18.3 CANCER OF HEPATIC FLEXURE (HCC): ICD-10-CM

## 2020-05-19 LAB
ALBUMIN SERPL BCP-MCNC: 3.5 G/DL (ref 3.5–5)
ALP SERPL-CCNC: 80 U/L (ref 46–116)
ALT SERPL W P-5'-P-CCNC: 17 U/L (ref 12–78)
ANION GAP SERPL CALCULATED.3IONS-SCNC: 7 MMOL/L (ref 4–13)
AST SERPL W P-5'-P-CCNC: 11 U/L (ref 5–45)
BASOPHILS # BLD AUTO: 0.08 THOUSANDS/ΜL (ref 0–0.1)
BASOPHILS NFR BLD AUTO: 1 % (ref 0–1)
BILIRUB SERPL-MCNC: 0.21 MG/DL (ref 0.2–1)
BUN SERPL-MCNC: 9 MG/DL (ref 5–25)
CALCIUM SERPL-MCNC: 8.8 MG/DL (ref 8.3–10.1)
CEA SERPL-MCNC: 0.8 NG/ML (ref 0–3)
CHLORIDE SERPL-SCNC: 105 MMOL/L (ref 100–108)
CO2 SERPL-SCNC: 25 MMOL/L (ref 21–32)
CREAT SERPL-MCNC: 0.75 MG/DL (ref 0.6–1.3)
EOSINOPHIL # BLD AUTO: 0.1 THOUSAND/ΜL (ref 0–0.61)
EOSINOPHIL NFR BLD AUTO: 1 % (ref 0–6)
ERYTHROCYTE [DISTWIDTH] IN BLOOD BY AUTOMATED COUNT: 12.8 % (ref 11.6–15.1)
GFR SERPL CREATININE-BSD FRML MDRD: 104 ML/MIN/1.73SQ M
GLUCOSE SERPL-MCNC: 102 MG/DL (ref 65–140)
HCT VFR BLD AUTO: 36.7 % (ref 34.8–46.1)
HGB BLD-MCNC: 12.1 G/DL (ref 11.5–15.4)
IMM GRANULOCYTES # BLD AUTO: 0.02 THOUSAND/UL (ref 0–0.2)
IMM GRANULOCYTES NFR BLD AUTO: 0 % (ref 0–2)
LYMPHOCYTES # BLD AUTO: 1.54 THOUSANDS/ΜL (ref 0.6–4.47)
LYMPHOCYTES NFR BLD AUTO: 19 % (ref 14–44)
MCH RBC QN AUTO: 30.2 PG (ref 26.8–34.3)
MCHC RBC AUTO-ENTMCNC: 33 G/DL (ref 31.4–37.4)
MCV RBC AUTO: 92 FL (ref 82–98)
MONOCYTES # BLD AUTO: 0.49 THOUSAND/ΜL (ref 0.17–1.22)
MONOCYTES NFR BLD AUTO: 6 % (ref 4–12)
NEUTROPHILS # BLD AUTO: 5.7 THOUSANDS/ΜL (ref 1.85–7.62)
NEUTS SEG NFR BLD AUTO: 73 % (ref 43–75)
NRBC BLD AUTO-RTO: 0 /100 WBCS
PLATELET # BLD AUTO: 281 THOUSANDS/UL (ref 149–390)
PMV BLD AUTO: 11.3 FL (ref 8.9–12.7)
POTASSIUM SERPL-SCNC: 3.8 MMOL/L (ref 3.5–5.3)
PROT SERPL-MCNC: 7.6 G/DL (ref 6.4–8.2)
RBC # BLD AUTO: 4.01 MILLION/UL (ref 3.81–5.12)
SODIUM SERPL-SCNC: 137 MMOL/L (ref 136–145)
WBC # BLD AUTO: 7.93 THOUSAND/UL (ref 4.31–10.16)

## 2020-05-19 PROCEDURE — 36415 COLL VENOUS BLD VENIPUNCTURE: CPT

## 2020-05-19 PROCEDURE — 82378 CARCINOEMBRYONIC ANTIGEN: CPT

## 2020-05-19 PROCEDURE — 80053 COMPREHEN METABOLIC PANEL: CPT

## 2020-05-19 PROCEDURE — 85025 COMPLETE CBC W/AUTO DIFF WBC: CPT

## 2020-06-04 ENCOUNTER — OFFICE VISIT (OUTPATIENT)
Dept: GASTROENTEROLOGY | Facility: CLINIC | Age: 35
End: 2020-06-04
Payer: COMMERCIAL

## 2020-06-04 VITALS
TEMPERATURE: 100 F | HEART RATE: 99 BPM | BODY MASS INDEX: 30.44 KG/M2 | WEIGHT: 165.4 LBS | DIASTOLIC BLOOD PRESSURE: 76 MMHG | SYSTOLIC BLOOD PRESSURE: 110 MMHG | HEIGHT: 62 IN

## 2020-06-04 DIAGNOSIS — R10.84 GENERALIZED ABDOMINAL PAIN: Primary | ICD-10-CM

## 2020-06-04 DIAGNOSIS — R10.84 GENERALIZED ABDOMINAL PAIN: ICD-10-CM

## 2020-06-04 DIAGNOSIS — R19.7 DIARRHEA, UNSPECIFIED TYPE: Primary | ICD-10-CM

## 2020-06-04 DIAGNOSIS — C18.3 MALIGNANT NEOPLASM OF HEPATIC FLEXURE (HCC): ICD-10-CM

## 2020-06-04 PROCEDURE — 99213 OFFICE O/P EST LOW 20 MIN: CPT | Performed by: PHYSICIAN ASSISTANT

## 2020-06-04 RX ORDER — ARIPIPRAZOLE 15 MG/1
15 TABLET ORAL DAILY
COMMUNITY
Start: 2020-05-27 | End: 2020-10-16

## 2020-06-04 RX ORDER — MONTELUKAST SODIUM 4 MG/1
1 TABLET, CHEWABLE ORAL 3 TIMES DAILY
Qty: 90 TABLET | Refills: 3 | Status: SHIPPED | OUTPATIENT
Start: 2020-06-04 | End: 2020-10-16

## 2020-06-04 RX ORDER — DICYCLOMINE HCL 20 MG
20 TABLET ORAL EVERY 6 HOURS PRN
Qty: 60 TABLET | Refills: 3 | Status: SHIPPED | OUTPATIENT
Start: 2020-06-04 | End: 2020-10-16

## 2020-06-09 ENCOUNTER — OFFICE VISIT (OUTPATIENT)
Dept: HEMATOLOGY ONCOLOGY | Facility: CLINIC | Age: 35
End: 2020-06-09
Payer: COMMERCIAL

## 2020-06-09 ENCOUNTER — APPOINTMENT (OUTPATIENT)
Dept: LAB | Facility: HOSPITAL | Age: 35
End: 2020-06-09
Payer: COMMERCIAL

## 2020-06-09 VITALS
OXYGEN SATURATION: 98 % | TEMPERATURE: 99.1 F | HEIGHT: 62 IN | RESPIRATION RATE: 18 BRPM | HEART RATE: 95 BPM | SYSTOLIC BLOOD PRESSURE: 110 MMHG | WEIGHT: 165 LBS | BODY MASS INDEX: 30.36 KG/M2 | DIASTOLIC BLOOD PRESSURE: 80 MMHG

## 2020-06-09 DIAGNOSIS — C18.3 MALIGNANT NEOPLASM OF HEPATIC FLEXURE (HCC): ICD-10-CM

## 2020-06-09 DIAGNOSIS — C18.3 CANCER OF HEPATIC FLEXURE (HCC): Primary | ICD-10-CM

## 2020-06-09 DIAGNOSIS — R19.7 DIARRHEA, UNSPECIFIED TYPE: ICD-10-CM

## 2020-06-09 DIAGNOSIS — R10.84 GENERALIZED ABDOMINAL PAIN: ICD-10-CM

## 2020-06-09 LAB
ALBUMIN SERPL BCP-MCNC: 3.6 G/DL (ref 3.5–5)
ALP SERPL-CCNC: 87 U/L (ref 46–116)
ALT SERPL W P-5'-P-CCNC: 17 U/L (ref 12–78)
ANION GAP SERPL CALCULATED.3IONS-SCNC: 10 MMOL/L (ref 4–13)
AST SERPL W P-5'-P-CCNC: 12 U/L (ref 5–45)
BASOPHILS # BLD AUTO: 0.06 THOUSANDS/ΜL (ref 0–0.1)
BASOPHILS NFR BLD AUTO: 1 % (ref 0–1)
BILIRUB SERPL-MCNC: 0.2 MG/DL (ref 0.2–1)
BUN SERPL-MCNC: 7 MG/DL (ref 5–25)
CALCIUM SERPL-MCNC: 8.8 MG/DL (ref 8.3–10.1)
CEA SERPL-MCNC: 0.7 NG/ML (ref 0–3)
CHLORIDE SERPL-SCNC: 105 MMOL/L (ref 100–108)
CO2 SERPL-SCNC: 24 MMOL/L (ref 21–32)
CREAT SERPL-MCNC: 0.75 MG/DL (ref 0.6–1.3)
CRP SERPL QL: <3 MG/L
EOSINOPHIL # BLD AUTO: 0.2 THOUSAND/ΜL (ref 0–0.61)
EOSINOPHIL NFR BLD AUTO: 2 % (ref 0–6)
ERYTHROCYTE [DISTWIDTH] IN BLOOD BY AUTOMATED COUNT: 12.4 % (ref 11.6–15.1)
GFR SERPL CREATININE-BSD FRML MDRD: 104 ML/MIN/1.73SQ M
GLUCOSE P FAST SERPL-MCNC: 103 MG/DL (ref 65–99)
HCT VFR BLD AUTO: 37.7 % (ref 34.8–46.1)
HGB BLD-MCNC: 12.6 G/DL (ref 11.5–15.4)
IMM GRANULOCYTES # BLD AUTO: 0.04 THOUSAND/UL (ref 0–0.2)
IMM GRANULOCYTES NFR BLD AUTO: 1 % (ref 0–2)
LYMPHOCYTES # BLD AUTO: 2 THOUSANDS/ΜL (ref 0.6–4.47)
LYMPHOCYTES NFR BLD AUTO: 23 % (ref 14–44)
MCH RBC QN AUTO: 30.3 PG (ref 26.8–34.3)
MCHC RBC AUTO-ENTMCNC: 33.4 G/DL (ref 31.4–37.4)
MCV RBC AUTO: 91 FL (ref 82–98)
MONOCYTES # BLD AUTO: 0.39 THOUSAND/ΜL (ref 0.17–1.22)
MONOCYTES NFR BLD AUTO: 5 % (ref 4–12)
NEUTROPHILS # BLD AUTO: 5.95 THOUSANDS/ΜL (ref 1.85–7.62)
NEUTS SEG NFR BLD AUTO: 68 % (ref 43–75)
NRBC BLD AUTO-RTO: 0 /100 WBCS
PLATELET # BLD AUTO: 281 THOUSANDS/UL (ref 149–390)
PMV BLD AUTO: 11 FL (ref 8.9–12.7)
POTASSIUM SERPL-SCNC: 4.1 MMOL/L (ref 3.5–5.3)
PROT SERPL-MCNC: 8 G/DL (ref 6.4–8.2)
RBC # BLD AUTO: 4.16 MILLION/UL (ref 3.81–5.12)
SODIUM SERPL-SCNC: 139 MMOL/L (ref 136–145)
WBC # BLD AUTO: 8.64 THOUSAND/UL (ref 4.31–10.16)

## 2020-06-09 PROCEDURE — 36415 COLL VENOUS BLD VENIPUNCTURE: CPT

## 2020-06-09 PROCEDURE — 82784 ASSAY IGA/IGD/IGG/IGM EACH: CPT

## 2020-06-09 PROCEDURE — 82175 ASSAY OF ARSENIC: CPT

## 2020-06-09 PROCEDURE — 99213 OFFICE O/P EST LOW 20 MIN: CPT | Performed by: INTERNAL MEDICINE

## 2020-06-09 PROCEDURE — 80053 COMPREHEN METABOLIC PANEL: CPT

## 2020-06-09 PROCEDURE — 86255 FLUORESCENT ANTIBODY SCREEN: CPT

## 2020-06-09 PROCEDURE — 85025 COMPLETE CBC W/AUTO DIFF WBC: CPT

## 2020-06-09 PROCEDURE — 83655 ASSAY OF LEAD: CPT

## 2020-06-09 PROCEDURE — 83825 ASSAY OF MERCURY: CPT

## 2020-06-09 PROCEDURE — 82378 CARCINOEMBRYONIC ANTIGEN: CPT

## 2020-06-09 PROCEDURE — 83516 IMMUNOASSAY NONANTIBODY: CPT

## 2020-06-09 PROCEDURE — 86140 C-REACTIVE PROTEIN: CPT

## 2020-06-10 ENCOUNTER — TRANSCRIBE ORDERS (OUTPATIENT)
Dept: ADMINISTRATIVE | Facility: HOSPITAL | Age: 35
End: 2020-06-10

## 2020-06-10 LAB
ENDOMYSIUM IGA SER QL: NEGATIVE
GLIADIN PEPTIDE IGA SER-ACNC: 8 UNITS (ref 0–19)
GLIADIN PEPTIDE IGG SER-ACNC: 2 UNITS (ref 0–19)
IGA SERPL-MCNC: 403 MG/DL (ref 87–352)
TTG IGA SER-ACNC: <2 U/ML (ref 0–3)
TTG IGG SER-ACNC: <2 U/ML (ref 0–5)

## 2020-06-12 ENCOUNTER — HOSPITAL ENCOUNTER (OUTPATIENT)
Dept: CT IMAGING | Facility: CLINIC | Age: 35
Discharge: HOME | End: 2020-06-12
Payer: COMMERCIAL

## 2020-06-12 DIAGNOSIS — R19.7 DIARRHEA, UNSPECIFIED TYPE: ICD-10-CM

## 2020-06-12 PROCEDURE — 74177 CT ABD & PELVIS W/CONTRAST: CPT

## 2020-06-12 RX ADMIN — IOHEXOL 100 ML: 350 INJECTION, SOLUTION INTRAVENOUS at 10:03

## 2020-06-15 ENCOUNTER — TELEPHONE (OUTPATIENT)
Dept: GASTROENTEROLOGY | Facility: CLINIC | Age: 35
End: 2020-06-15

## 2020-06-16 ENCOUNTER — APPOINTMENT (OUTPATIENT)
Dept: LAB | Facility: CLINIC | Age: 35
End: 2020-06-16
Payer: COMMERCIAL

## 2020-06-16 DIAGNOSIS — R19.7 DIARRHEA, UNSPECIFIED TYPE: ICD-10-CM

## 2020-06-16 PROCEDURE — 83835 ASSAY OF METANEPHRINES: CPT

## 2020-06-16 PROCEDURE — 87505 NFCT AGENT DETECTION GI: CPT

## 2020-06-16 PROCEDURE — 83497 ASSAY OF 5-HIAA: CPT

## 2020-06-17 LAB
C DIFF TOX GENS STL QL NAA+PROBE: NEGATIVE
CAMPYLOBACTER DNA SPEC NAA+PROBE: NORMAL
SALMONELLA DNA SPEC QL NAA+PROBE: NORMAL
SHIGA TOXIN STX GENE SPEC NAA+PROBE: NORMAL
SHIGELLA DNA SPEC QL NAA+PROBE: NORMAL

## 2020-06-18 LAB
ARSENIC BLD-MCNC: 7 UG/L (ref 2–23)
LEAD BLD-MCNC: NORMAL UG/DL (ref 0–4)
MERCURY BLD-MCNC: 1.1 UG/L (ref 0–14.9)

## 2020-06-19 LAB
METANEPH 24H UR-MRATE: 23 UG/24 HR (ref 36–209)
METANEPHS 24H UR-MCNC: 76 UG/L
NORMETANEPHRINE 24H UR-MCNC: 175 UG/L
NORMETANEPHRINE 24H UR-MRATE: 53 UG/24 HR (ref 131–612)

## 2020-06-21 LAB
5OH-INDOLEACETATE 24H UR-MCNC: 1.8 MG/L
5OH-INDOLEACETATE 24H UR-MRATE: 0.5 MG/24 HR (ref 0–14.9)

## 2020-07-06 ENCOUNTER — OFFICE VISIT (OUTPATIENT)
Dept: GASTROENTEROLOGY | Facility: CLINIC | Age: 35
End: 2020-07-06
Payer: COMMERCIAL

## 2020-07-06 VITALS
SYSTOLIC BLOOD PRESSURE: 102 MMHG | DIASTOLIC BLOOD PRESSURE: 80 MMHG | WEIGHT: 170 LBS | BODY MASS INDEX: 31.28 KG/M2 | HEIGHT: 62 IN | HEART RATE: 99 BPM | TEMPERATURE: 99.6 F

## 2020-07-06 DIAGNOSIS — R19.7 DIARRHEA, UNSPECIFIED TYPE: ICD-10-CM

## 2020-07-06 DIAGNOSIS — R10.84 GENERALIZED ABDOMINAL PAIN: Primary | ICD-10-CM

## 2020-07-06 PROCEDURE — 99213 OFFICE O/P EST LOW 20 MIN: CPT | Performed by: PHYSICIAN ASSISTANT

## 2020-07-06 RX ORDER — PHENOBARBITAL, HYOSCYAMINE SULFATE, ATROPINE SULFATE AND SCOPOLAMINE HYDROBROMIDE .0194; .1037; 16.2; .0065 MG/1; MG/1; MG/1; MG/1
1 TABLET ORAL EVERY 6 HOURS PRN
Qty: 60 TABLET | Refills: 3 | Status: SHIPPED | OUTPATIENT
Start: 2020-07-06 | End: 2020-07-20 | Stop reason: ALTCHOICE

## 2020-07-06 NOTE — PROGRESS NOTES
[facility] - Outpatient Follow-up Note  Rosa M Amanda 28 y o  female MRN: [id number]  Encounter: [id number]          ASSESSMENT AND PLAN:      1  Generalized abdominal pain  2  Diarrhea, unspecified type  Colestid TID caused constipation and bloating  Decrease to once daily    CT showed uterine fibroids which are likely contributing to her pain and bloating  She will make an appt with gyn    ______________________________________________________________________    SUBJECTIVE:  43-year-old female presents for follow-up of abdominal pain and diarrhea  She started taking Colestid 1 g t i d  And this caused constipation  As she became constipated she had increasing amounts of abdominal pain and bloating  Workup after her last visit to include labs, stool testing, urine testing and CT was relatively unremarkable  She had uterine fibroids noted on her CT scan  The CT also raise concern for colitis however colonoscopy in February of this year when she was still having the diarrhea was negative  Stool testing for infectious colitis with negative and C reactive protein was normal   She continues to deny rectal bleeding  Unfortunately, she has been unable to get an appointment with psychiatry and remains highly anxious  She previously failed treatment with Xifaxan and Lotronex although it seems she was only treated with low dose Lotronex  Dicyclomine has not helped her pain  REVIEW OF SYSTEMS IS OTHERWISE NEGATIVE        Historical Information   Past Medical History:   Diagnosis Date    Anxiety     Cancer [facility])     colon    Depression     Irritable bowel syndrome      Past Surgical History:   Procedure Laterality Date    COLONOSCOPY      HEMORRHOID SURGERY      [place] LAP,SURG,COLECTOMY, PARTIAL, W/ANAST N/A 11/12/2019    Procedure: ROBOTIC ASSISTED ILEOCOLECTOMY;  Surgeon: Charlotte Kemp MD;  [facility];  Service: Robotics     Social History   Social History Substance and Sexual Activity   Alcohol Use Not Currently    Alcohol/week: 1 0 standard drinks    Types: 1 Glasses of wine per week    Frequency: 2-4 times a month     Social History     Substance and Sexual Activity   Drug Use Yes    Types: Marijuana    Comment: medical marijuana     Social History     Tobacco Use   Smoking Status Never Smoker   Smokeless Tobacco Never Used     Family History   Problem Relation Age of Onset    Cancer Mother     Thyroid cancer Mother     Diabetes Father     Heart disease Father     Colon polyps Maternal Uncle     Breast cancer Maternal Grandmother     Pancreatic cancer Paternal Grandfather     Colon polyps Paternal Aunt     Colon cancer Neg Hx        Meds/Allergies       Current Outpatient Medications:     albuterol (2 5 mg/3 mL) 0 083 % nebulizer solution    ALPRAZolam (XANAX) 0 25 mg tablet    ARIPiprazole (ABILIFY) 15 mg tablet    colestipol (COLESTID) 1 g tablet    dicyclomine (BENTYL) 20 mg tablet    FLUoxetine (PROzac) 20 mg capsule    hydrOXYzine HCL (ATARAX) 25 mg tablet    Multiple Vitamins-Minerals (HAIR SKIN AND NAILS FORMULA PO)    Allergies   Allergen Reactions    Nuts      GI distress    Peanut-Containing Drug Products      GI distress    Peanut-Containing Drug Products            Objective     Blood pressure 102/80, pulse 99, temperature 99 6 °F (37 6 °C), temperature source Tympanic, height 5' 2" (1 575 m), weight 77 1 kg (170 lb)  Body mass index is 31 09 kg/m²  PHYSICAL EXAM:      General Appearance:   Alert, cooperative, no distress   HEENT:   Normocephalic, atraumatic, anicteric      Neck:  Supple, symmetrical, trachea midline   Lungs:   Clear to auscultation bilaterally; no rales, rhonchi or wheezing; respirations unlabored    Heart[de-identified]   Regular rate and rhythm; no murmur, rub, or gallop     Abdomen:   Soft, non-tender, non-distended; normal bowel sounds; no masses, no organomegaly    Genitalia:   Deferred    Rectal:   Deferred  Extremities:  No cyanosis, clubbing or edema    Pulses:  2+ and symmetric    Skin:  No jaundice, rashes, or lesions    Lymph nodes:  No palpable cervical lymphadenopathy        Lab Results:   No visits with results within 1 Day(s) from this visit  Latest known visit with results is:   Appointment on 06/16/2020   Component Date Value    Salmonella sp PCR 06/16/2020 None Detected     Shigella sp/Enteroinvasi* 06/16/2020 None Detected     Campylobacter sp (jejuni* 06/16/2020 None Detected     Shiga toxin 1/Shiga toxi* 06/16/2020 None Detected     C difficile toxin by PCR 06/16/2020 Negative     5-HIAA, 24H Ur 06/16/2020 0 5     5-HIAA, Urine 06/16/2020 1 8     Metaneph, Total, 24H Ur 06/16/2020 23*    Metanephrines, Ur 06/16/2020 76     Normetanephrine, Ur 06/16/2020 175     Normetanephrine, 24H Ur 06/16/2020 53*         Radiology Results:   Ct Abdomen Pelvis W Contrast    Result Date: 6/15/2020  Narrative: CT ABDOMEN AND PELVIS WITH IV CONTRAST INDICATION:   R19 7: Diarrhea, unspecified  Abdominal pain  Fever  COMPARISON:  Compared with 10/22/2019 TECHNIQUE:  CT examination of the abdomen and pelvis was performed  Axial, sagittal, and coronal 2D reformatted images were created from the source data and submitted for interpretation  Radiation dose length product (DLP) for this visit:  779 mGy-cm   This examination, like all CT scans performed in the [facility], was performed utilizing techniques to minimize radiation dose exposure, including the use of iterative reconstruction and automated exposure control  IV Contrast:  100 mL of iohexol (OMNIPAQUE) Enteric Contrast:  Enteric contrast was not administered  FINDINGS: ABDOMEN LOWER CHEST:  No clinically significant abnormality identified in the visualized lower chest  LIVER/BILIARY TREE:  Unremarkable  GALLBLADDER:  No calcified gallstones  No pericholecystic inflammatory change  SPLEEN:  Unremarkable  PANCREAS:  Unremarkable   ADRENAL GLANDS:  Unremarkable  KIDNEYS/URETERS:  Unremarkable  No hydronephrosis  STOMACH AND BOWEL:  Interval right hemicolectomy with resection of the known hepatic flexure mass  The rectal sigmoid colon demonstrates apparent wall thickening and is decompressed  Colitis cannot be excluded  APPENDIX:  No findings to suggest appendicitis  ABDOMINOPELVIC CAVITY:  No ascites  No pneumoperitoneum  No lymphadenopathy  VESSELS:  Unremarkable for patient's age  PELVIS REPRODUCTIVE ORGANS:  Uterus is large and lobulated with bulky fibroids which enhances homogeneously  Largest intramural fibroid on the left anteriorly measuring 6 4 cm  Largest fundal fibroid which is completely exophytic measuring 6 5 x 6 cm  A U shaped device is seen in the vaginal region  URINARY BLADDER:  Unremarkable  ABDOMINAL WALL/INGUINAL REGIONS:  Unremarkable  OSSEOUS STRUCTURES:  No acute fracture or destructive osseous lesion  Impression: 1  Interval right hemicolectomy  2  Decompressed rectosigmoid colon with apparent wall thickening  Colitis cannot be excluded given the history of abdominal pain and diarrhea    3  Multiple uterine fibroids with a completely exophytic fundal fibroid measuring 6 5 cm are unchanged  The study was marked in EPIC for significant notification   Workstation performed: [id number]

## 2020-07-20 ENCOUNTER — TRANSCRIBE ORDERS (OUTPATIENT)
Dept: ADMINISTRATIVE | Facility: HOSPITAL | Age: 35
End: 2020-07-20

## 2020-07-20 ENCOUNTER — OFFICE VISIT (OUTPATIENT)
Dept: OBGYN CLINIC | Age: 35
End: 2020-07-20
Payer: COMMERCIAL

## 2020-07-20 VITALS
TEMPERATURE: 98.4 F | WEIGHT: 170.8 LBS | DIASTOLIC BLOOD PRESSURE: 82 MMHG | BODY MASS INDEX: 31.24 KG/M2 | SYSTOLIC BLOOD PRESSURE: 110 MMHG

## 2020-07-20 DIAGNOSIS — R10.2 PELVIC PAIN: ICD-10-CM

## 2020-07-20 DIAGNOSIS — Z11.3 SCREEN FOR STD (SEXUALLY TRANSMITTED DISEASE): ICD-10-CM

## 2020-07-20 DIAGNOSIS — Z01.419 ENCOUNTER FOR GYNECOLOGICAL EXAMINATION WITHOUT ABNORMAL FINDING: Primary | ICD-10-CM

## 2020-07-20 DIAGNOSIS — D25.9 UTERINE LEIOMYOMA, UNSPECIFIED LOCATION: ICD-10-CM

## 2020-07-20 DIAGNOSIS — N63.20 LEFT BREAST LUMP: Primary | ICD-10-CM

## 2020-07-20 DIAGNOSIS — N63.20 LEFT BREAST LUMP: ICD-10-CM

## 2020-07-20 PROCEDURE — 87591 N.GONORRHOEAE DNA AMP PROB: CPT | Performed by: NURSE PRACTITIONER

## 2020-07-20 PROCEDURE — 87491 CHLMYD TRACH DNA AMP PROBE: CPT | Performed by: NURSE PRACTITIONER

## 2020-07-20 PROCEDURE — 87624 HPV HI-RISK TYP POOLED RSLT: CPT | Performed by: NURSE PRACTITIONER

## 2020-07-20 PROCEDURE — 99385 PREV VISIT NEW AGE 18-39: CPT | Performed by: NURSE PRACTITIONER

## 2020-07-20 PROCEDURE — G0145 SCR C/V CYTO,THINLAYER,RESCR: HCPCS | Performed by: NURSE PRACTITIONER

## 2020-07-20 RX ORDER — FLUOXETINE HYDROCHLORIDE 20 MG/1
CAPSULE ORAL
COMMUNITY
Start: 2020-07-15 | End: 2020-07-20 | Stop reason: SDUPTHER

## 2020-07-20 NOTE — PATIENT INSTRUCTIONS
Uterine Fibroids   WHAT YOU NEED TO KNOW:   What are uterine fibroids? Uterine fibroids are growths found inside your uterus (womb)  Uterine fibroids also may be called tumors (lumps) or leiomyomas  Uterine fibroids often appear in groups, or you may have only one  They can be small or large, and they can grow in size  They are almost always benign (not cancer) and likely will not spread to other parts of your body  What increases my risk of getting uterine fibroids? The cause of uterine fibroids is not clear  Ask your healthcare provider about these and other risk factors for uterine fibroids:  · Heredity:  Your risk for uterine fibroids may increase if a close family member also has fibroids  · Hormone imbalance:  Hormones are chemicals that affect your growth  Too many hormones may cause uterine fibroids or make them grow  · Early onset of menstrual periods: If you started your period at an early age, you may be at risk for uterine fibroids  · Childbearing age:  Uterine fibroids occur more often in women of childbearing age  They are even more common when you are 36to 61years old  They may grow when you are pregnant and shrink after you no longer have a monthly period  · Excess weight:  Too much body weight may increase your hormone levels and lead to uterine fibroids  Ask your healthcare provider about your ideal weight for your height  What are the signs and symptoms of uterine fibroids? You may have no signs or symptoms  Symptoms depend on the size, type, and number of fibroids you have  Symptoms also depend on where the fibroids are inside your uterus  Signs and symptoms of fibroids include the following:  · Heavy or painful menstrual bleeding  · Pelvic pressure and pain  You may have increased pelvic pain during sex  · Constipation or pain when you have a bowel movement (BM)  · Frequent need to urinate  How are uterine fibroids diagnosed?   Ask your healthcare provider about these and other tests that you may need:  · Blood tests: You may need blood taken to give caregivers information about how your body is working  The blood may be taken from your hand, arm, or IV  · Pelvic exam:  This is also called an internal or vaginal exam  During a pelvic exam, your healthcare provider gently puts a speculum into your vagina  A speculum is a tool that opens your vagina  This lets your healthcare provider see your cervix (bottom part of your uterus)  With gloved hands, your healthcare provider will check the size and shape of your uterus and ovaries  · Vaginal ultrasound:  During this test, your healthcare provider places a small wand in your vagina  Sound waves from the wand show pictures of the inside of your uterus (womb) and ovaries  · Biopsy:  A biopsy is a tissue sample of a fibroid that your healthcare provider takes from your uterus for testing  How are uterine fibroids treated? You may not need treatment for your fibroids if you do not have symptoms  The following treatments may shrink your fibroids and help your pain:  · Medicines:     ¨ Hormone medicine: This medicine changes the level of certain hormones and may then help shrink your fibroids  ¨ Contraceptives: These medicines help prevent pregnancy  They also may help shrink your fibroids  ¨ Nonsteroidal anti-inflammatory medicine: This group of medicine is also called NSAIDs  Nonsteroidal anti-inflammatory medicine may help decrease pain, fever, and swelling  This medicine can be bought without a doctor's order  This medicine can cause stomach bleeding or kidney problems in certain people  · Surgery: The type of surgery you may have depends on the size, number, and location of your fibroids  Ask your healthcare provider for more information about the following:     ¨ Embolization: This surgery blocks or slows the flow of blood to the fibroid  This may make your fibroids shrink or disappear  ¨ Myomectomy: This surgery removes your uterine fibroids  ¨ Hysterectomy:  For this surgery, your healthcare provider removes your uterus from your body  You may need a hysterectomy if your condition is severe (very bad)  After this surgery, you will no longer be able to have children  When should I contact my healthcare provider? · Your symptoms, such as heavy bleeding, pain, or pelvic pressure, worsen  · You feel weak and are more tired than usual      · You do not feel like your bladder is empty after you urinate  You also may urinate small amounts more often  · You have more trouble having or are not able to have a BM  · You have new or worse hot flashes  · You have any questions about your condition or care  When should I seek immediate care or call 911? · Your heart begins to race, and you feel faint  · You begin to pass large blood clots from your vagina  CARE AGREEMENT:   You have the right to help plan your care  Learn about your health condition and how it may be treated  Discuss treatment options with your caregivers to decide what care you want to receive  You always have the right to refuse treatment  The above information is an  only  It is not intended as medical advice for individual conditions or treatments  Talk to your doctor, nurse or pharmacist before following any medical regimen to see if it is safe and effective for you  © 2017 [employer] Information is for End User's use only and may not be sold, redistributed or otherwise used for commercial purposes  All illustrations and images included in CareNotes® are the copyrighted property of [employer]  or Dillon Alexandra
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
## 2020-07-21 ENCOUNTER — TELEPHONE (OUTPATIENT)
Dept: GASTROENTEROLOGY | Facility: CLINIC | Age: 35
End: 2020-07-21

## 2020-07-21 NOTE — TELEPHONE ENCOUNTER
Patient is stating that you were supposed to rx a pain med that starts with "phe" and that the med was cx'ed on the office's end  We cannot find anything in the chart to match this med  Please call patient directly to go over this with her thank you

## 2020-07-21 NOTE — TELEPHONE ENCOUNTER
It just looks like Astrid Ferrer recommended decreasing her colestipol to once a day from 3 times a day  She needs a new prescription?

## 2020-07-21 NOTE — TELEPHONE ENCOUNTER
Jaguar pt-  Patient was a medication a few weeks ago  Ashly Christensen ) she did not know the name     No order has been received @ the pharmacy as of yet     Is there a problem? Uses: Yovani Alonso [phone]  Ashly Christensen Please phone [phone] to kalie Christensen

## 2020-07-22 DIAGNOSIS — R10.84 GENERALIZED ABDOMINAL PAIN: Primary | ICD-10-CM

## 2020-07-22 LAB
C TRACH DNA SPEC QL NAA+PROBE: NEGATIVE
N GONORRHOEA DNA SPEC QL NAA+PROBE: NEGATIVE

## 2020-07-22 RX ORDER — PHENOBARBITAL, HYOSCYAMINE SULFATE, ATROPINE SULFATE AND SCOPOLAMINE HYDROBROMIDE .0194; .1037; 16.2; .0065 MG/1; MG/1; MG/1; MG/1
1 TABLET ORAL EVERY 6 HOURS PRN
Qty: 60 TABLET | Refills: 3 | Status: SHIPPED | OUTPATIENT
Start: 2020-07-22 | End: 2020-08-04 | Stop reason: SDUPTHER

## 2020-07-23 ENCOUNTER — TELEPHONE (OUTPATIENT)
Dept: GASTROENTEROLOGY | Facility: CLINIC | Age: 35
End: 2020-07-23

## 2020-07-23 LAB
HPV HR 12 DNA CVX QL NAA+PROBE: NEGATIVE
HPV16 DNA CVX QL NAA+PROBE: NEGATIVE
HPV18 DNA CVX QL NAA+PROBE: NEGATIVE

## 2020-07-27 ENCOUNTER — HOSPITAL ENCOUNTER (OUTPATIENT)
Dept: ULTRASOUND IMAGING | Facility: CLINIC | Age: 35
Discharge: HOME | End: 2020-07-27
Payer: COMMERCIAL

## 2020-07-27 DIAGNOSIS — R10.2 PELVIC PAIN: ICD-10-CM

## 2020-07-27 DIAGNOSIS — D25.9 UTERINE LEIOMYOMA, UNSPECIFIED LOCATION: ICD-10-CM

## 2020-07-27 PROCEDURE — 76830 TRANSVAGINAL US NON-OB: CPT

## 2020-07-27 PROCEDURE — 76856 US EXAM PELVIC COMPLETE: CPT

## 2020-07-28 ENCOUNTER — TELEPHONE (OUTPATIENT)
Dept: GASTROENTEROLOGY | Facility: CLINIC | Age: 35
End: 2020-07-28

## 2020-07-28 NOTE — TELEPHONE ENCOUNTER
Johanna Ludwig from [facility] called  Patient will need to take the generic  Needs clarification of dose   Please call Johanna Ludwig asap at [phone]

## 2020-07-29 LAB
LAB AP GYN PRIMARY INTERPRETATION: NORMAL
Lab: NORMAL

## 2020-08-03 ENCOUNTER — TELEPHONE (OUTPATIENT)
Dept: OBGYN CLINIC | Age: 35
End: 2020-08-03

## 2020-08-03 NOTE — TELEPHONE ENCOUNTER
Patient called looking for results of U/S that she had done on 7-  Best call back [phone], thanks!

## 2020-08-04 ENCOUNTER — HOSPITAL ENCOUNTER (OUTPATIENT)
Dept: MAMMOGRAPHY | Facility: CLINIC | Age: 35
Discharge: HOME | End: 2020-08-04
Payer: COMMERCIAL

## 2020-08-04 ENCOUNTER — HOSPITAL ENCOUNTER (OUTPATIENT)
Dept: ULTRASOUND IMAGING | Facility: CLINIC | Age: 35
Discharge: HOME | End: 2020-08-04
Payer: COMMERCIAL

## 2020-08-04 VITALS — HEIGHT: 62 IN | BODY MASS INDEX: 31.28 KG/M2 | WEIGHT: 170 LBS

## 2020-08-04 DIAGNOSIS — R10.84 GENERALIZED ABDOMINAL PAIN: ICD-10-CM

## 2020-08-04 DIAGNOSIS — N63.20 LEFT BREAST LUMP: ICD-10-CM

## 2020-08-04 PROCEDURE — 77066 DX MAMMO INCL CAD BI: CPT

## 2020-08-04 PROCEDURE — G0279 TOMOSYNTHESIS, MAMMO: HCPCS

## 2020-08-04 PROCEDURE — 76642 ULTRASOUND BREAST LIMITED: CPT

## 2020-08-04 RX ORDER — PHENOBARBITAL, HYOSCYAMINE SULFATE, ATROPINE SULFATE AND SCOPOLAMINE HYDROBROMIDE .0194; .1037; 16.2; .0065 MG/1; MG/1; MG/1; MG/1
1 TABLET ORAL EVERY 6 HOURS PRN
Qty: 60 TABLET | Refills: 3 | Status: SHIPPED | OUTPATIENT
Start: 2020-08-04 | End: 2020-10-16

## 2020-08-04 NOTE — TELEPHONE ENCOUNTER
Astrid Ferrer pt- Pt needs Donnatal 16 2mg every 6 hours as needed sent to [facility] pharmacy on [address]

## 2020-08-05 NOTE — TELEPHONE ENCOUNTER
Contacted and scheduled in e lazara with provider Marciano Villalta for 11 am on Thursday 09/17/2020  Pt was grateful

## 2020-08-05 NOTE — TELEPHONE ENCOUNTER
I called pt and reviewed her results with her  Multiple fibroids with symptoms of heavy menses and pelvic pain, but may desire more children  So with this she wants to also discuss surgical options with a MD, I also expressed my concerns do the amount of fibroids as well  So can review this with an MD, but still could be managed by hormonal contraception but if pt wants to here all options available to here she will need to discuss with an MD       Please call pt to schedule consult to review US result and fibroid management options with MD   Thanks!

## 2020-08-12 DIAGNOSIS — R10.84 GENERALIZED ABDOMINAL PAIN: ICD-10-CM

## 2020-08-12 RX ORDER — PHENOBARBITAL, HYOSCYAMINE SULFATE, ATROPINE SULFATE, SCOPOLAMINE HYDROBROMIDE 16.2; .1037; .0194; .0065 MG/1; MG/1; MG/1; MG/1
TABLET ORAL
Qty: 60 TABLET | Refills: 3 | OUTPATIENT
Start: 2020-08-12

## 2020-08-12 NOTE — TELEPHONE ENCOUNTER
[facility] patient PA for Donnotal stating the wrong PA was done can we please call pharmacy to clarify there phone number is [phone]

## 2020-08-12 NOTE — TELEPHONE ENCOUNTER
Called pharmacy  Prior Jen Virk was approved for phenobarbital only and not all of the four ingredients of what Donnatol consisits of  Was told to  Redo auth with medication phenohytro instead which is generic version that pharmacy stocks

## 2020-08-14 ENCOUNTER — TELEPHONE (OUTPATIENT)
Dept: GASTROENTEROLOGY | Facility: CLINIC | Age: 35
End: 2020-08-14

## 2020-08-14 NOTE — TELEPHONE ENCOUNTER
Jacob Mejía pt - Ana called, the Donnatal does not require a review, the pharmacy made a mistake and they have to order it and it will be in tomorrow for the pt to

## 2020-08-18 ENCOUNTER — TELEPHONE (OUTPATIENT)
Dept: GASTROENTEROLOGY | Facility: CLINIC | Age: 35
End: 2020-08-18

## 2020-08-18 ENCOUNTER — TELEPHONE (OUTPATIENT)
Dept: OBGYN CLINIC | Age: 35
End: 2020-08-18

## 2020-08-18 DIAGNOSIS — N63.20 LEFT BREAST LUMP: Primary | ICD-10-CM

## 2020-08-18 NOTE — TELEPHONE ENCOUNTER
Called pt     pt said the generic donnatal is no longer being manufactured and this is the medication     Wants to know if can have something else to replace donnatal   Routing to pa

## 2020-08-18 NOTE — TELEPHONE ENCOUNTER
Called pt to advise of benign mammo diagnostic results  Patient advised reports she is still having breast discomfort advised per tanvir recommendation referral to breast specialist provided Verito Vidal office # & refferal placed in chart advised pt I will mail her refferal but she can call there office for appt  Patient stated her understanding

## 2020-08-18 NOTE — TELEPHONE ENCOUNTER
----- Message from Robert Antonio, [address]  sent at 8/17/2020  2:13 PM EDT -----  Please review benign results,  If she continues to  Have discomfort, swelling we can refer her to breast doctors for further care for the lymph nodes  Please place referral and I will sign if she does Thanks!

## 2020-08-18 NOTE — TELEPHONE ENCOUNTER
Taya Ask - patient called the last 3 medictions we prescribed patient are not approved by insurance  Patient would like to speak with someone   Please call Yumiko Davis [phone]

## 2020-08-18 NOTE — TELEPHONE ENCOUNTER
[place] [facility] called  They spoke with patient today, 08/18/20  Generic donnatal is very expensive and the NDC is not available  Patient does not want to pay for prescription

## 2020-08-25 DIAGNOSIS — R10.84 GENERALIZED ABDOMINAL PAIN: Primary | ICD-10-CM

## 2020-08-25 RX ORDER — HYOSCYAMINE SULFATE 0.125 MG
0.12 TABLET ORAL EVERY 4 HOURS PRN
Qty: 60 TABLET | Refills: 3 | Status: SHIPPED | OUTPATIENT
Start: 2020-08-25 | End: 2020-10-16

## 2020-08-25 NOTE — TELEPHONE ENCOUNTER
Pt's  called, states pt is in a lot of pain and she needs some kind of medication called in that will be covered by her insurance  Please call Shivam Mariano at [phone]   Ty

## 2020-08-25 NOTE — TELEPHONE ENCOUNTER
Pt called and wanted to see if anything can be prescribed for her pain that would be covered by her insurance  Please call [phone] ty

## 2020-09-17 ENCOUNTER — OFFICE VISIT (OUTPATIENT)
Dept: OBGYN CLINIC | Age: 35
End: 2020-09-17
Payer: COMMERCIAL

## 2020-09-17 VITALS
BODY MASS INDEX: 31.47 KG/M2 | WEIGHT: 171 LBS | SYSTOLIC BLOOD PRESSURE: 124 MMHG | HEIGHT: 62 IN | DIASTOLIC BLOOD PRESSURE: 74 MMHG

## 2020-09-17 DIAGNOSIS — Z85.038 PERSONAL HISTORY OF COLON CANCER: ICD-10-CM

## 2020-09-17 DIAGNOSIS — D25.9 UTERINE LEIOMYOMA, UNSPECIFIED LOCATION: Primary | ICD-10-CM

## 2020-09-17 DIAGNOSIS — R10.2 PELVIC PAIN: ICD-10-CM

## 2020-09-17 PROCEDURE — 99214 OFFICE O/P EST MOD 30 MIN: CPT | Performed by: STUDENT IN AN ORGANIZED HEALTH CARE EDUCATION/TRAINING PROGRAM
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
## 2020-09-17 NOTE — ASSESSMENT & PLAN NOTE
Per Korin Sheikh genetic testing negative  We reviewed risk of surgery especially given her previous bowel resection, risk of fistula, risk of needing ostomy, expressed understanding  Would like to proceed with hysterectomy  Recent Pap smear negative, no history LEEP/cone/cryotherapy  Plan for robotic-assisted approach, will reach out to Emily Reza for additional surgical assistance if needed

## 2020-09-17 NOTE — ASSESSMENT & PLAN NOTE
We reviewed her imaging together, large fibroid likely contributing to pain  Reviewed that may not solve pain 100% but willing to take this chance - even if can only decrease pain from and 8 to a 4, looking to improve quality of life

## 2020-09-17 NOTE — ASSESSMENT & PLAN NOTE
Reviewed that her largest fibroid is subserosal and is likely contributing to her discomfort and pain  Reviewed options to manage including medical management, IUD, OCP, Depo Provera  Also reviewed myomectomy as a possibility for managing symptomatic fibroids  Bart Flavors previously failed medical management with Depo  Does not want to try different medications, would like definitive surgical management  Is concerned for the possibility of fibroids growing back if the uterus is left in place  Certain at this time that she is done with childbearing, especially given her young age at diagnosis of colon cancer  Reviewed risks and benefits of surgery including risk of damage to bowel, bladder, blood vessels, nerves  Risk formation of fistula  Risk of needing larger incision or laparotomy to complete surgery  Accepts blood products if would be life saving  Plan to perform endometrial biopsy at next visit, Rx sent for cytotec for pretreatment

## 2020-09-30 ENCOUNTER — APPOINTMENT (OUTPATIENT)
Dept: LAB | Facility: HOSPITAL | Age: 35
End: 2020-09-30
Payer: COMMERCIAL

## 2020-09-30 ENCOUNTER — TELEPHONE (OUTPATIENT)
Dept: HEMATOLOGY ONCOLOGY | Facility: CLINIC | Age: 35
End: 2020-09-30

## 2020-09-30 DIAGNOSIS — C18.3 CANCER OF HEPATIC FLEXURE (HCC): ICD-10-CM

## 2020-09-30 DIAGNOSIS — Z01.818 PRE-OP TESTING: Primary | ICD-10-CM

## 2020-09-30 LAB
ABO GROUP BLD: NORMAL
ALBUMIN SERPL BCP-MCNC: 3.4 G/DL (ref 3.5–5)
ALP SERPL-CCNC: 78 U/L (ref 46–116)
ALT SERPL W P-5'-P-CCNC: 15 U/L (ref 12–78)
ANION GAP SERPL CALCULATED.3IONS-SCNC: 11 MMOL/L (ref 4–13)
AST SERPL W P-5'-P-CCNC: 9 U/L (ref 5–45)
BASOPHILS # BLD AUTO: 0.06 THOUSANDS/ΜL (ref 0–0.1)
BASOPHILS NFR BLD AUTO: 1 % (ref 0–1)
BILIRUB SERPL-MCNC: 0.2 MG/DL (ref 0.2–1)
BLD GP AB SCN SERPL QL: NEGATIVE
BUN SERPL-MCNC: 6 MG/DL (ref 5–25)
CALCIUM ALBUM COR SERPL-MCNC: 9 MG/DL (ref 8.3–10.1)
CALCIUM SERPL-MCNC: 8.5 MG/DL (ref 8.3–10.1)
CEA SERPL-MCNC: 0.9 NG/ML (ref 0–3)
CHLORIDE SERPL-SCNC: 103 MMOL/L (ref 100–108)
CO2 SERPL-SCNC: 23 MMOL/L (ref 21–32)
CREAT SERPL-MCNC: 0.8 MG/DL (ref 0.6–1.3)
EOSINOPHIL # BLD AUTO: 0.27 THOUSAND/ΜL (ref 0–0.61)
EOSINOPHIL NFR BLD AUTO: 3 % (ref 0–6)
ERYTHROCYTE [DISTWIDTH] IN BLOOD BY AUTOMATED COUNT: 12.3 % (ref 11.6–15.1)
GFR SERPL CREATININE-BSD FRML MDRD: 96 ML/MIN/1.73SQ M
GLUCOSE P FAST SERPL-MCNC: 99 MG/DL (ref 65–99)
HCT VFR BLD AUTO: 37.6 % (ref 34.8–46.1)
HGB BLD-MCNC: 12.3 G/DL (ref 11.5–15.4)
IMM GRANULOCYTES # BLD AUTO: 0.05 THOUSAND/UL (ref 0–0.2)
IMM GRANULOCYTES NFR BLD AUTO: 1 % (ref 0–2)
LYMPHOCYTES # BLD AUTO: 1.64 THOUSANDS/ΜL (ref 0.6–4.47)
LYMPHOCYTES NFR BLD AUTO: 19 % (ref 14–44)
MCH RBC QN AUTO: 29.1 PG (ref 26.8–34.3)
MCHC RBC AUTO-ENTMCNC: 32.7 G/DL (ref 31.4–37.4)
MCV RBC AUTO: 89 FL (ref 82–98)
MONOCYTES # BLD AUTO: 0.38 THOUSAND/ΜL (ref 0.17–1.22)
MONOCYTES NFR BLD AUTO: 4 % (ref 4–12)
NEUTROPHILS # BLD AUTO: 6.17 THOUSANDS/ΜL (ref 1.85–7.62)
NEUTS SEG NFR BLD AUTO: 72 % (ref 43–75)
NRBC BLD AUTO-RTO: 0 /100 WBCS
PLATELET # BLD AUTO: 264 THOUSANDS/UL (ref 149–390)
PMV BLD AUTO: 11.4 FL (ref 8.9–12.7)
POTASSIUM SERPL-SCNC: 3.9 MMOL/L (ref 3.5–5.3)
PROT SERPL-MCNC: 7.5 G/DL (ref 6.4–8.2)
RBC # BLD AUTO: 4.23 MILLION/UL (ref 3.81–5.12)
RH BLD: POSITIVE
SODIUM SERPL-SCNC: 137 MMOL/L (ref 136–145)
SPECIMEN EXPIRATION DATE: NORMAL
WBC # BLD AUTO: 8.57 THOUSAND/UL (ref 4.31–10.16)

## 2020-09-30 PROCEDURE — 82378 CARCINOEMBRYONIC ANTIGEN: CPT

## 2020-09-30 PROCEDURE — 87147 CULTURE TYPE IMMUNOLOGIC: CPT

## 2020-09-30 PROCEDURE — 80053 COMPREHEN METABOLIC PANEL: CPT

## 2020-09-30 PROCEDURE — 85025 COMPLETE CBC W/AUTO DIFF WBC: CPT

## 2020-09-30 PROCEDURE — 36415 COLL VENOUS BLD VENIPUNCTURE: CPT

## 2020-09-30 PROCEDURE — 86850 RBC ANTIBODY SCREEN: CPT

## 2020-09-30 PROCEDURE — 87086 URINE CULTURE/COLONY COUNT: CPT

## 2020-09-30 PROCEDURE — 86901 BLOOD TYPING SEROLOGIC RH(D): CPT

## 2020-09-30 PROCEDURE — 86900 BLOOD TYPING SEROLOGIC ABO: CPT

## 2020-09-30 NOTE — TELEPHONE ENCOUNTER
Patient called to confirm blood work is ordered by Dr Melburn Canavan, confirmed  blood work has been ordered for her upcoming appt,

## 2020-10-02 LAB
BACTERIA UR CULT: ABNORMAL
BACTERIA UR CULT: ABNORMAL

## 2020-10-12 ENCOUNTER — TELEPHONE (OUTPATIENT)
Dept: OBGYN CLINIC | Age: 35
End: 2020-10-12

## 2020-10-13 ENCOUNTER — OFFICE VISIT (OUTPATIENT)
Dept: HEMATOLOGY ONCOLOGY | Facility: CLINIC | Age: 35
End: 2020-10-13
Payer: COMMERCIAL

## 2020-10-13 VITALS
SYSTOLIC BLOOD PRESSURE: 110 MMHG | OXYGEN SATURATION: 98 % | BODY MASS INDEX: 30.55 KG/M2 | RESPIRATION RATE: 18 BRPM | HEIGHT: 62 IN | HEART RATE: 106 BPM | TEMPERATURE: 99 F | DIASTOLIC BLOOD PRESSURE: 78 MMHG | WEIGHT: 166 LBS

## 2020-10-13 DIAGNOSIS — C18.3 CANCER OF HEPATIC FLEXURE (HCC): ICD-10-CM

## 2020-10-13 DIAGNOSIS — Z85.038 PERSONAL HISTORY OF COLON CANCER: Primary | ICD-10-CM

## 2020-10-13 PROCEDURE — 99213 OFFICE O/P EST LOW 20 MIN: CPT | Performed by: INTERNAL MEDICINE

## 2020-10-15 ENCOUNTER — PROCEDURE VISIT (OUTPATIENT)
Dept: OBGYN CLINIC | Age: 35
End: 2020-10-15
Payer: COMMERCIAL

## 2020-10-15 VITALS
WEIGHT: 167 LBS | BODY MASS INDEX: 30.54 KG/M2 | SYSTOLIC BLOOD PRESSURE: 128 MMHG | DIASTOLIC BLOOD PRESSURE: 80 MMHG | TEMPERATURE: 98 F

## 2020-10-15 DIAGNOSIS — Z32.02 NEGATIVE PREGNANCY TEST: ICD-10-CM

## 2020-10-15 DIAGNOSIS — D25.9 UTERINE LEIOMYOMA, UNSPECIFIED LOCATION: Primary | ICD-10-CM

## 2020-10-15 LAB — SL AMB POCT URINE HCG: NORMAL

## 2020-10-15 PROCEDURE — 58100 BIOPSY OF UTERUS LINING: CPT | Performed by: STUDENT IN AN ORGANIZED HEALTH CARE EDUCATION/TRAINING PROGRAM

## 2020-10-15 PROCEDURE — 81025 URINE PREGNANCY TEST: CPT | Performed by: STUDENT IN AN ORGANIZED HEALTH CARE EDUCATION/TRAINING PROGRAM

## 2020-10-15 PROCEDURE — 88305 TISSUE EXAM BY PATHOLOGIST: CPT | Performed by: PATHOLOGY

## 2020-10-20 ENCOUNTER — ANESTHESIA EVENT (OUTPATIENT)
Dept: PERIOP | Facility: HOSPITAL | Age: 35
End: 2020-10-20
Payer: COMMERCIAL

## 2020-10-21 ENCOUNTER — HOSPITAL ENCOUNTER (OUTPATIENT)
Facility: HOSPITAL | Age: 35
Setting detail: OUTPATIENT SURGERY
Discharge: HOME | End: 2020-10-21
Attending: STUDENT IN AN ORGANIZED HEALTH CARE EDUCATION/TRAINING PROGRAM | Admitting: STUDENT IN AN ORGANIZED HEALTH CARE EDUCATION/TRAINING PROGRAM
Payer: COMMERCIAL

## 2020-10-21 ENCOUNTER — ANESTHESIA (OUTPATIENT)
Dept: PERIOP | Facility: HOSPITAL | Age: 35
End: 2020-10-21
Payer: COMMERCIAL

## 2020-10-21 VITALS — HEART RATE: 98 BPM

## 2020-10-21 VITALS
RESPIRATION RATE: 20 BRPM | BODY MASS INDEX: 30.73 KG/M2 | SYSTOLIC BLOOD PRESSURE: 114 MMHG | TEMPERATURE: 97.1 F | OXYGEN SATURATION: 98 % | WEIGHT: 167 LBS | HEIGHT: 62 IN | DIASTOLIC BLOOD PRESSURE: 60 MMHG | HEART RATE: 80 BPM

## 2020-10-21 DIAGNOSIS — D25.9 UTERINE LEIOMYOMA, UNSPECIFIED LOCATION: ICD-10-CM

## 2020-10-21 DIAGNOSIS — Z90.710 S/P LAPAROSCOPIC HYSTERECTOMY: Primary | ICD-10-CM

## 2020-10-21 DIAGNOSIS — R10.2 PELVIC PAIN IN FEMALE: ICD-10-CM

## 2020-10-21 PROBLEM — R11.2 PONV (POSTOPERATIVE NAUSEA AND VOMITING): Status: ACTIVE | Noted: 2020-10-21

## 2020-10-21 PROBLEM — Z98.890 PONV (POSTOPERATIVE NAUSEA AND VOMITING): Status: ACTIVE | Noted: 2020-10-21

## 2020-10-21 LAB
EXT PREGNANCY TEST URINE: NEGATIVE
EXT. CONTROL: NORMAL
GLUCOSE SERPL-MCNC: 100 MG/DL (ref 65–140)

## 2020-10-21 PROCEDURE — 81025 URINE PREGNANCY TEST: CPT | Performed by: STUDENT IN AN ORGANIZED HEALTH CARE EDUCATION/TRAINING PROGRAM

## 2020-10-21 PROCEDURE — 88305 TISSUE EXAM BY PATHOLOGIST: CPT | Performed by: PATHOLOGY

## 2020-10-21 PROCEDURE — 88342 IMHCHEM/IMCYTCHM 1ST ANTB: CPT | Performed by: PATHOLOGY

## 2020-10-21 PROCEDURE — 88341 IMHCHEM/IMCYTCHM EA ADD ANTB: CPT | Performed by: PATHOLOGY

## 2020-10-21 PROCEDURE — 88307 TISSUE EXAM BY PATHOLOGIST: CPT | Performed by: PATHOLOGY

## 2020-10-21 PROCEDURE — 58573 TLH W/T/O UTERUS OVER 250 G: CPT | Performed by: STUDENT IN AN ORGANIZED HEALTH CARE EDUCATION/TRAINING PROGRAM

## 2020-10-21 PROCEDURE — 82948 REAGENT STRIP/BLOOD GLUCOSE: CPT

## 2020-10-21 PROCEDURE — 99024 POSTOP FOLLOW-UP VISIT: CPT | Performed by: STUDENT IN AN ORGANIZED HEALTH CARE EDUCATION/TRAINING PROGRAM

## 2020-10-21 RX ORDER — ALBUTEROL SULFATE 2.5 MG/3ML
2.5 SOLUTION RESPIRATORY (INHALATION) EVERY 4 HOURS PRN
Status: DISCONTINUED | OUTPATIENT
Start: 2020-10-21 | End: 2020-10-21 | Stop reason: HOSPADM

## 2020-10-21 RX ORDER — OXYCODONE HYDROCHLORIDE 5 MG/1
5 TABLET ORAL EVERY 4 HOURS PRN
Qty: 7 TABLET | Refills: 0 | Status: SHIPPED | OUTPATIENT
Start: 2020-10-21 | End: 2020-11-03 | Stop reason: HOSPADM

## 2020-10-21 RX ORDER — METOCLOPRAMIDE HYDROCHLORIDE 5 MG/ML
10 INJECTION INTRAMUSCULAR; INTRAVENOUS ONCE AS NEEDED
Status: DISCONTINUED | OUTPATIENT
Start: 2020-10-21 | End: 2020-10-21 | Stop reason: HOSPADM

## 2020-10-21 RX ORDER — MIDAZOLAM HYDROCHLORIDE 2 MG/2ML
INJECTION, SOLUTION INTRAMUSCULAR; INTRAVENOUS AS NEEDED
Status: DISCONTINUED | OUTPATIENT
Start: 2020-10-21 | End: 2020-10-21

## 2020-10-21 RX ORDER — SODIUM CHLORIDE 9 MG/ML
INJECTION, SOLUTION INTRAVENOUS AS NEEDED
Status: DISCONTINUED | OUTPATIENT
Start: 2020-10-21 | End: 2020-10-21 | Stop reason: HOSPADM

## 2020-10-21 RX ORDER — KETOROLAC TROMETHAMINE 30 MG/ML
INJECTION, SOLUTION INTRAMUSCULAR; INTRAVENOUS AS NEEDED
Status: DISCONTINUED | OUTPATIENT
Start: 2020-10-21 | End: 2020-10-21

## 2020-10-21 RX ORDER — ONDANSETRON 2 MG/ML
4 INJECTION INTRAMUSCULAR; INTRAVENOUS ONCE AS NEEDED
Status: DISCONTINUED | OUTPATIENT
Start: 2020-10-21 | End: 2020-10-21 | Stop reason: HOSPADM

## 2020-10-21 RX ORDER — SODIUM CHLORIDE, SODIUM LACTATE, POTASSIUM CHLORIDE, CALCIUM CHLORIDE 600; 310; 30; 20 MG/100ML; MG/100ML; MG/100ML; MG/100ML
100 INJECTION, SOLUTION INTRAVENOUS CONTINUOUS
Status: DISCONTINUED | OUTPATIENT
Start: 2020-10-21 | End: 2020-10-21 | Stop reason: HOSPADM

## 2020-10-21 RX ORDER — HYDROMORPHONE HCL/PF 1 MG/ML
0.2 SYRINGE (ML) INJECTION
Status: DISCONTINUED | OUTPATIENT
Start: 2020-10-21 | End: 2020-10-21 | Stop reason: HOSPADM

## 2020-10-21 RX ORDER — HEPARIN SODIUM 5000 [USP'U]/ML
5000 INJECTION, SOLUTION INTRAVENOUS; SUBCUTANEOUS
Status: DISCONTINUED | OUTPATIENT
Start: 2020-10-21 | End: 2020-10-21 | Stop reason: HOSPADM

## 2020-10-21 RX ORDER — PROPOFOL 10 MG/ML
INJECTION, EMULSION INTRAVENOUS AS NEEDED
Status: DISCONTINUED | OUTPATIENT
Start: 2020-10-21 | End: 2020-10-21

## 2020-10-21 RX ORDER — BUPIVACAINE HYDROCHLORIDE 2.5 MG/ML
INJECTION, SOLUTION EPIDURAL; INFILTRATION; INTRACAUDAL AS NEEDED
Status: DISCONTINUED | OUTPATIENT
Start: 2020-10-21 | End: 2020-10-21 | Stop reason: HOSPADM

## 2020-10-21 RX ORDER — LIDOCAINE HYDROCHLORIDE 10 MG/ML
INJECTION, SOLUTION EPIDURAL; INFILTRATION; INTRACAUDAL; PERINEURAL AS NEEDED
Status: DISCONTINUED | OUTPATIENT
Start: 2020-10-21 | End: 2020-10-21

## 2020-10-21 RX ORDER — HYDROMORPHONE HCL/PF 1 MG/ML
SYRINGE (ML) INJECTION AS NEEDED
Status: DISCONTINUED | OUTPATIENT
Start: 2020-10-21 | End: 2020-10-21

## 2020-10-21 RX ORDER — KETAMINE HCL IN NACL, ISO-OSM 100MG/10ML
SYRINGE (ML) INJECTION AS NEEDED
Status: DISCONTINUED | OUTPATIENT
Start: 2020-10-21 | End: 2020-10-21

## 2020-10-21 RX ORDER — SODIUM CHLORIDE 9 MG/ML
INJECTION, SOLUTION INTRAVENOUS CONTINUOUS PRN
Status: DISCONTINUED | OUTPATIENT
Start: 2020-10-21 | End: 2020-10-21

## 2020-10-21 RX ORDER — ONDANSETRON 2 MG/ML
INJECTION INTRAMUSCULAR; INTRAVENOUS AS NEEDED
Status: DISCONTINUED | OUTPATIENT
Start: 2020-10-21 | End: 2020-10-21

## 2020-10-21 RX ORDER — OXYCODONE HYDROCHLORIDE 5 MG/1
10 TABLET ORAL EVERY 4 HOURS PRN
Status: DISCONTINUED | OUTPATIENT
Start: 2020-10-21 | End: 2020-10-21 | Stop reason: HOSPADM

## 2020-10-21 RX ORDER — PROMETHAZINE HYDROCHLORIDE 25 MG/ML
6.25 INJECTION, SOLUTION INTRAMUSCULAR; INTRAVENOUS ONCE AS NEEDED
Status: DISCONTINUED | OUTPATIENT
Start: 2020-10-21 | End: 2020-10-21 | Stop reason: HOSPADM

## 2020-10-21 RX ORDER — ACETAMINOPHEN 325 MG/1
650 TABLET ORAL EVERY 6 HOURS PRN
Status: DISCONTINUED | OUTPATIENT
Start: 2020-10-21 | End: 2020-10-21 | Stop reason: HOSPADM

## 2020-10-21 RX ORDER — NEOSTIGMINE METHYLSULFATE 1 MG/ML
INJECTION INTRAVENOUS AS NEEDED
Status: DISCONTINUED | OUTPATIENT
Start: 2020-10-21 | End: 2020-10-21

## 2020-10-21 RX ORDER — ALBUTEROL SULFATE 2.5 MG/3ML
2.5 SOLUTION RESPIRATORY (INHALATION) ONCE AS NEEDED
Status: DISCONTINUED | OUTPATIENT
Start: 2020-10-21 | End: 2020-10-21 | Stop reason: HOSPADM

## 2020-10-21 RX ORDER — DEXAMETHASONE SODIUM PHOSPHATE 4 MG/ML
INJECTION, SOLUTION INTRA-ARTICULAR; INTRALESIONAL; INTRAMUSCULAR; INTRAVENOUS; SOFT TISSUE AS NEEDED
Status: DISCONTINUED | OUTPATIENT
Start: 2020-10-21 | End: 2020-10-21

## 2020-10-21 RX ORDER — SCOLOPAMINE TRANSDERMAL SYSTEM 1 MG/1
PATCH, EXTENDED RELEASE TRANSDERMAL
Status: COMPLETED
Start: 2020-10-21 | End: 2020-10-21

## 2020-10-21 RX ORDER — SCOLOPAMINE TRANSDERMAL SYSTEM 1 MG/1
1 PATCH, EXTENDED RELEASE TRANSDERMAL ONCE
Status: COMPLETED | OUTPATIENT
Start: 2020-10-21 | End: 2020-10-21

## 2020-10-21 RX ORDER — SODIUM CHLORIDE, SODIUM LACTATE, POTASSIUM CHLORIDE, CALCIUM CHLORIDE 600; 310; 30; 20 MG/100ML; MG/100ML; MG/100ML; MG/100ML
INJECTION, SOLUTION INTRAVENOUS CONTINUOUS PRN
Status: DISCONTINUED | OUTPATIENT
Start: 2020-10-21 | End: 2020-10-21

## 2020-10-21 RX ORDER — FENTANYL CITRATE/PF 50 MCG/ML
25 SYRINGE (ML) INJECTION
Status: COMPLETED | OUTPATIENT
Start: 2020-10-21 | End: 2020-10-21

## 2020-10-21 RX ORDER — GLYCOPYRROLATE 0.2 MG/ML
INJECTION INTRAMUSCULAR; INTRAVENOUS AS NEEDED
Status: DISCONTINUED | OUTPATIENT
Start: 2020-10-21 | End: 2020-10-21

## 2020-10-21 RX ORDER — OXYCODONE HYDROCHLORIDE 5 MG/1
5 TABLET ORAL EVERY 4 HOURS PRN
Status: DISCONTINUED | OUTPATIENT
Start: 2020-10-21 | End: 2020-10-21 | Stop reason: HOSPADM

## 2020-10-21 RX ORDER — PROPOFOL 10 MG/ML
INJECTION, EMULSION INTRAVENOUS CONTINUOUS PRN
Status: DISCONTINUED | OUTPATIENT
Start: 2020-10-21 | End: 2020-10-21

## 2020-10-21 RX ORDER — MAGNESIUM HYDROXIDE 1200 MG/15ML
LIQUID ORAL AS NEEDED
Status: DISCONTINUED | OUTPATIENT
Start: 2020-10-21 | End: 2020-10-21 | Stop reason: HOSPADM

## 2020-10-21 RX ORDER — IBUPROFEN 600 MG/1
600 TABLET ORAL EVERY 6 HOURS PRN
Status: DISCONTINUED | OUTPATIENT
Start: 2020-10-21 | End: 2020-10-21 | Stop reason: HOSPADM

## 2020-10-21 RX ORDER — LABETALOL 20 MG/4 ML (5 MG/ML) INTRAVENOUS SYRINGE
AS NEEDED
Status: DISCONTINUED | OUTPATIENT
Start: 2020-10-21 | End: 2020-10-21

## 2020-10-21 RX ORDER — FENTANYL CITRATE 50 UG/ML
INJECTION, SOLUTION INTRAMUSCULAR; INTRAVENOUS AS NEEDED
Status: DISCONTINUED | OUTPATIENT
Start: 2020-10-21 | End: 2020-10-21

## 2020-10-21 RX ORDER — ONDANSETRON 2 MG/ML
4 INJECTION INTRAMUSCULAR; INTRAVENOUS EVERY 6 HOURS PRN
Status: DISCONTINUED | OUTPATIENT
Start: 2020-10-21 | End: 2020-10-21 | Stop reason: HOSPADM

## 2020-10-21 RX ORDER — ROCURONIUM BROMIDE 10 MG/ML
INJECTION, SOLUTION INTRAVENOUS AS NEEDED
Status: DISCONTINUED | OUTPATIENT
Start: 2020-10-21 | End: 2020-10-21

## 2020-10-21 RX ADMIN — SODIUM CHLORIDE, SODIUM LACTATE, POTASSIUM CHLORIDE, AND CALCIUM CHLORIDE: .6; .31; .03; .02 INJECTION, SOLUTION INTRAVENOUS at 12:05

## 2020-10-21 RX ADMIN — FENTANYL CITRATE 25 MCG: 50 INJECTION INTRAMUSCULAR; INTRAVENOUS at 15:53

## 2020-10-21 RX ADMIN — SCOPOLAMINE 1 PATCH: 1 PATCH TRANSDERMAL at 12:11

## 2020-10-21 RX ADMIN — ROCURONIUM BROMIDE 10 MG: 10 INJECTION, SOLUTION INTRAVENOUS at 14:00

## 2020-10-21 RX ADMIN — FENTANYL CITRATE 50 MCG: 50 INJECTION INTRAMUSCULAR; INTRAVENOUS at 12:27

## 2020-10-21 RX ADMIN — FENTANYL CITRATE 25 MCG: 50 INJECTION INTRAMUSCULAR; INTRAVENOUS at 16:00

## 2020-10-21 RX ADMIN — OXYCODONE HYDROCHLORIDE 10 MG: 5 TABLET ORAL at 17:08

## 2020-10-21 RX ADMIN — SODIUM CHLORIDE: 0.9 INJECTION, SOLUTION INTRAVENOUS at 12:27

## 2020-10-21 RX ADMIN — ONDANSETRON 4 MG: 2 INJECTION INTRAMUSCULAR; INTRAVENOUS at 15:09

## 2020-10-21 RX ADMIN — ACETAMINOPHEN 650 MG: 325 TABLET, FILM COATED ORAL at 17:39

## 2020-10-21 RX ADMIN — LABETALOL 20 MG/4 ML (5 MG/ML) INTRAVENOUS SYRINGE 5 MG: at 13:12

## 2020-10-21 RX ADMIN — HYDROMORPHONE HYDROCHLORIDE 0.2 MG: 1 INJECTION, SOLUTION INTRAMUSCULAR; INTRAVENOUS; SUBCUTANEOUS at 16:12

## 2020-10-21 RX ADMIN — FENTANYL CITRATE 25 MCG: 50 INJECTION INTRAMUSCULAR; INTRAVENOUS at 16:25

## 2020-10-21 RX ADMIN — ROCURONIUM BROMIDE 20 MG: 10 INJECTION, SOLUTION INTRAVENOUS at 13:03

## 2020-10-21 RX ADMIN — GLYCOPYRROLATE 0.8 MG: 0.2 INJECTION, SOLUTION INTRAMUSCULAR; INTRAVENOUS at 15:08

## 2020-10-21 RX ADMIN — HYDROMORPHONE HYDROCHLORIDE 0.2 MG: 1 INJECTION, SOLUTION INTRAMUSCULAR; INTRAVENOUS; SUBCUTANEOUS at 16:34

## 2020-10-21 RX ADMIN — PROPOFOL 200 MCG/KG/MIN: 10 INJECTION, EMULSION INTRAVENOUS at 12:21

## 2020-10-21 RX ADMIN — FENTANYL CITRATE 25 MCG: 50 INJECTION INTRAMUSCULAR; INTRAVENOUS at 15:47

## 2020-10-21 RX ADMIN — LABETALOL 20 MG/4 ML (5 MG/ML) INTRAVENOUS SYRINGE 5 MG: at 12:55

## 2020-10-21 RX ADMIN — HEPARIN SODIUM 5000 UNITS: 5000 INJECTION, SOLUTION INTRAVENOUS; SUBCUTANEOUS at 12:35

## 2020-10-21 RX ADMIN — DEXAMETHASONE SODIUM PHOSPHATE 8 MG: 4 INJECTION, SOLUTION INTRAMUSCULAR; INTRAVENOUS at 12:40

## 2020-10-21 RX ADMIN — Medication 2000 MG: at 12:35

## 2020-10-21 RX ADMIN — KETOROLAC TROMETHAMINE 15 MG: 30 INJECTION, SOLUTION INTRAMUSCULAR at 15:07

## 2020-10-21 RX ADMIN — FENTANYL CITRATE 50 MCG: 50 INJECTION INTRAMUSCULAR; INTRAVENOUS at 12:21

## 2020-10-21 RX ADMIN — ROCURONIUM BROMIDE 50 MG: 10 INJECTION, SOLUTION INTRAVENOUS at 12:22

## 2020-10-21 RX ADMIN — HYDROMORPHONE HYDROCHLORIDE 0.5 MG: 1 INJECTION, SOLUTION INTRAMUSCULAR; INTRAVENOUS; SUBCUTANEOUS at 13:27

## 2020-10-21 RX ADMIN — MIDAZOLAM HYDROCHLORIDE 2 MG: 1 INJECTION, SOLUTION INTRAMUSCULAR; INTRAVENOUS at 12:11

## 2020-10-21 RX ADMIN — LIDOCAINE HYDROCHLORIDE 50 MG: 10 INJECTION, SOLUTION EPIDURAL; INFILTRATION; INTRACAUDAL at 12:21

## 2020-10-21 RX ADMIN — ONDANSETRON 4 MG: 2 INJECTION INTRAMUSCULAR; INTRAVENOUS at 12:40

## 2020-10-21 RX ADMIN — LABETALOL 20 MG/4 ML (5 MG/ML) INTRAVENOUS SYRINGE 5 MG: at 14:00

## 2020-10-21 RX ADMIN — HYDROMORPHONE HYDROCHLORIDE 0.2 MG: 1 INJECTION, SOLUTION INTRAMUSCULAR; INTRAVENOUS; SUBCUTANEOUS at 16:05

## 2020-10-21 RX ADMIN — Medication 30 MG: at 12:21

## 2020-10-21 RX ADMIN — PROPOFOL 200 MG: 10 INJECTION, EMULSION INTRAVENOUS at 12:21

## 2020-10-21 RX ADMIN — HYDROMORPHONE HYDROCHLORIDE 0.2 MG: 1 INJECTION, SOLUTION INTRAMUSCULAR; INTRAVENOUS; SUBCUTANEOUS at 16:41

## 2020-10-21 RX ADMIN — PHENYLEPHRINE HYDROCHLORIDE 20 MCG/MIN: 10 INJECTION INTRAVENOUS at 12:40

## 2020-10-21 RX ADMIN — Medication 20 MG: at 12:53

## 2020-10-21 RX ADMIN — SODIUM CHLORIDE, SODIUM LACTATE, POTASSIUM CHLORIDE, AND CALCIUM CHLORIDE: .6; .31; .03; .02 INJECTION, SOLUTION INTRAVENOUS at 13:38

## 2020-10-21 RX ADMIN — NEOSTIGMINE METHYLSULFATE 4 MG: 1 INJECTION INTRAVENOUS at 15:08

## 2020-10-22 ENCOUNTER — TELEPHONE (OUTPATIENT)
Dept: OBGYN CLINIC | Facility: CLINIC | Age: 35
End: 2020-10-22

## 2020-10-25 ENCOUNTER — TELEPHONE (OUTPATIENT)
Dept: OBGYN CLINIC | Facility: MEDICAL CENTER | Age: 35
End: 2020-10-25

## 2020-10-25 ENCOUNTER — TELEPHONE (OUTPATIENT)
Dept: OTHER | Facility: OTHER | Age: 35
End: 2020-10-25

## 2020-10-25 ENCOUNTER — HOSPITAL ENCOUNTER (INPATIENT)
Facility: HOSPITAL | Age: 35
LOS: 9 days | Discharge: HOME | DRG: 252 | End: 2020-11-03
Attending: STUDENT IN AN ORGANIZED HEALTH CARE EDUCATION/TRAINING PROGRAM | Admitting: STUDENT IN AN ORGANIZED HEALTH CARE EDUCATION/TRAINING PROGRAM
Payer: COMMERCIAL

## 2020-10-25 DIAGNOSIS — Z98.890 PONV (POSTOPERATIVE NAUSEA AND VOMITING): Primary | ICD-10-CM

## 2020-10-25 DIAGNOSIS — K56.609 SMALL BOWEL OBSTRUCTION (HCC): ICD-10-CM

## 2020-10-25 DIAGNOSIS — K81.9 CHOLECYSTITIS: ICD-10-CM

## 2020-10-25 DIAGNOSIS — Z98.890 POST-OPERATIVE NAUSEA AND VOMITING: Primary | ICD-10-CM

## 2020-10-25 DIAGNOSIS — R11.2 POST-OPERATIVE NAUSEA AND VOMITING: Primary | ICD-10-CM

## 2020-10-25 DIAGNOSIS — R11.2 PONV (POSTOPERATIVE NAUSEA AND VOMITING): Primary | ICD-10-CM

## 2020-10-25 LAB
ALBUMIN SERPL BCP-MCNC: 3.2 G/DL (ref 3.5–5)
ALP SERPL-CCNC: 68 U/L (ref 46–116)
ALT SERPL W P-5'-P-CCNC: 12 U/L (ref 12–78)
ANION GAP SERPL CALCULATED.3IONS-SCNC: 18 MMOL/L (ref 4–13)
AST SERPL W P-5'-P-CCNC: 6 U/L (ref 5–45)
BASOPHILS # BLD AUTO: 0.04 THOUSANDS/ΜL (ref 0–0.1)
BASOPHILS NFR BLD AUTO: 1 % (ref 0–1)
BILIRUB SERPL-MCNC: 0.38 MG/DL (ref 0.2–1)
BUN SERPL-MCNC: 4 MG/DL (ref 5–25)
CALCIUM ALBUM COR SERPL-MCNC: 8.7 MG/DL (ref 8.3–10.1)
CALCIUM SERPL-MCNC: 8.1 MG/DL (ref 8.3–10.1)
CHLORIDE SERPL-SCNC: 103 MMOL/L (ref 100–108)
CO2 SERPL-SCNC: 16 MMOL/L (ref 21–32)
CREAT SERPL-MCNC: 0.57 MG/DL (ref 0.6–1.3)
EOSINOPHIL # BLD AUTO: 0.02 THOUSAND/ΜL (ref 0–0.61)
EOSINOPHIL NFR BLD AUTO: 0 % (ref 0–6)
ERYTHROCYTE [DISTWIDTH] IN BLOOD BY AUTOMATED COUNT: 12.6 % (ref 11.6–15.1)
GFR SERPL CREATININE-BSD FRML MDRD: 120 ML/MIN/1.73SQ M
GLUCOSE SERPL-MCNC: 97 MG/DL (ref 65–140)
HCT VFR BLD AUTO: 36.2 % (ref 34.8–46.1)
HGB BLD-MCNC: 11.8 G/DL (ref 11.5–15.4)
IMM GRANULOCYTES # BLD AUTO: 0.09 THOUSAND/UL (ref 0–0.2)
IMM GRANULOCYTES NFR BLD AUTO: 1 % (ref 0–2)
LACTATE SERPL-SCNC: 0.6 MMOL/L (ref 0.5–2)
LYMPHOCYTES # BLD AUTO: 1.1 THOUSANDS/ΜL (ref 0.6–4.47)
LYMPHOCYTES NFR BLD AUTO: 16 % (ref 14–44)
MCH RBC QN AUTO: 29.5 PG (ref 26.8–34.3)
MCHC RBC AUTO-ENTMCNC: 32.6 G/DL (ref 31.4–37.4)
MCV RBC AUTO: 91 FL (ref 82–98)
MONOCYTES # BLD AUTO: 0.53 THOUSAND/ΜL (ref 0.17–1.22)
MONOCYTES NFR BLD AUTO: 8 % (ref 4–12)
NEUTROPHILS # BLD AUTO: 5.04 THOUSANDS/ΜL (ref 1.85–7.62)
NEUTS SEG NFR BLD AUTO: 74 % (ref 43–75)
NRBC BLD AUTO-RTO: 0 /100 WBCS
PLATELET # BLD AUTO: 308 THOUSANDS/UL (ref 149–390)
PMV BLD AUTO: 10.8 FL (ref 8.9–12.7)
POTASSIUM SERPL-SCNC: 3.5 MMOL/L (ref 3.5–5.3)
PROT SERPL-MCNC: 7.2 G/DL (ref 6.4–8.2)
RBC # BLD AUTO: 4 MILLION/UL (ref 3.81–5.12)
SODIUM SERPL-SCNC: 137 MMOL/L (ref 136–145)
WBC # BLD AUTO: 6.82 THOUSAND/UL (ref 4.31–10.16)

## 2020-10-25 PROCEDURE — 80053 COMPREHEN METABOLIC PANEL: CPT | Performed by: STUDENT IN AN ORGANIZED HEALTH CARE EDUCATION/TRAINING PROGRAM

## 2020-10-25 PROCEDURE — 85025 COMPLETE CBC W/AUTO DIFF WBC: CPT | Performed by: STUDENT IN AN ORGANIZED HEALTH CARE EDUCATION/TRAINING PROGRAM

## 2020-10-25 PROCEDURE — 99232 SBSQ HOSP IP/OBS MODERATE 35: CPT | Performed by: STUDENT IN AN ORGANIZED HEALTH CARE EDUCATION/TRAINING PROGRAM

## 2020-10-25 PROCEDURE — 0D9670Z DRAINAGE OF STOMACH WITH DRAINAGE DEVICE, VIA NATURAL OR ARTIFICIAL OPENING: ICD-10-PCS | Performed by: OBSTETRICS & GYNECOLOGY

## 2020-10-25 PROCEDURE — 83605 ASSAY OF LACTIC ACID: CPT | Performed by: STUDENT IN AN ORGANIZED HEALTH CARE EDUCATION/TRAINING PROGRAM

## 2020-10-25 RX ORDER — DIPHENHYDRAMINE HYDROCHLORIDE 50 MG/ML
25 INJECTION INTRAMUSCULAR; INTRAVENOUS
Status: DISCONTINUED | OUTPATIENT
Start: 2020-10-25 | End: 2020-10-27

## 2020-10-25 RX ORDER — KETOROLAC TROMETHAMINE 30 MG/ML
30 INJECTION, SOLUTION INTRAMUSCULAR; INTRAVENOUS EVERY 6 HOURS SCHEDULED
Status: COMPLETED | OUTPATIENT
Start: 2020-10-26 | End: 2020-10-26

## 2020-10-25 RX ORDER — FAMOTIDINE 20 MG/1
20 TABLET, FILM COATED ORAL 2 TIMES DAILY
Status: DISCONTINUED | OUTPATIENT
Start: 2020-10-25 | End: 2020-11-03 | Stop reason: HOSPADM

## 2020-10-25 RX ORDER — ONDANSETRON 4 MG/1
4 TABLET, ORALLY DISINTEGRATING ORAL EVERY 6 HOURS PRN
Qty: 20 TABLET | Refills: 0 | Status: SHIPPED | OUTPATIENT
Start: 2020-10-25 | End: 2020-12-03

## 2020-10-25 RX ORDER — ACETAMINOPHEN 325 MG/1
650 TABLET ORAL EVERY 6 HOURS PRN
COMMUNITY

## 2020-10-25 RX ORDER — ONDANSETRON 2 MG/ML
4 INJECTION INTRAMUSCULAR; INTRAVENOUS EVERY 6 HOURS PRN
Status: DISCONTINUED | OUTPATIENT
Start: 2020-10-25 | End: 2020-10-25

## 2020-10-25 RX ORDER — ONDANSETRON 2 MG/ML
4 INJECTION INTRAMUSCULAR; INTRAVENOUS EVERY 6 HOURS SCHEDULED
Status: DISCONTINUED | OUTPATIENT
Start: 2020-10-26 | End: 2020-11-03 | Stop reason: HOSPADM

## 2020-10-25 RX ORDER — SODIUM CHLORIDE, SODIUM LACTATE, POTASSIUM CHLORIDE, CALCIUM CHLORIDE 600; 310; 30; 20 MG/100ML; MG/100ML; MG/100ML; MG/100ML
125 INJECTION, SOLUTION INTRAVENOUS CONTINUOUS
Status: DISCONTINUED | OUTPATIENT
Start: 2020-10-25 | End: 2020-10-26

## 2020-10-25 RX ADMIN — KETOROLAC TROMETHAMINE 30 MG: 30 INJECTION, SOLUTION INTRAMUSCULAR at 23:22

## 2020-10-25 RX ADMIN — FAMOTIDINE 20 MG: 10 INJECTION, SOLUTION INTRAVENOUS at 20:35

## 2020-10-25 RX ADMIN — DIPHENHYDRAMINE HYDROCHLORIDE 25 MG: 50 INJECTION, SOLUTION INTRAMUSCULAR; INTRAVENOUS at 23:21

## 2020-10-25 RX ADMIN — Medication 1 SPRAY: at 23:30

## 2020-10-25 RX ADMIN — SODIUM CHLORIDE, SODIUM LACTATE, POTASSIUM CHLORIDE, AND CALCIUM CHLORIDE 125 ML/HR: .6; .31; .03; .02 INJECTION, SOLUTION INTRAVENOUS at 20:37

## 2020-10-25 RX ADMIN — ONDANSETRON 4 MG: 2 INJECTION INTRAMUSCULAR; INTRAVENOUS at 23:23

## 2020-10-26 PROBLEM — K56.609 SMALL BOWEL OBSTRUCTION (HCC): Status: ACTIVE | Noted: 2020-10-26

## 2020-10-26 LAB
ALBUMIN SERPL BCP-MCNC: 2.8 G/DL (ref 3.5–5)
ALP SERPL-CCNC: 58 U/L (ref 46–116)
ALT SERPL W P-5'-P-CCNC: 10 U/L (ref 12–78)
ANION GAP SERPL CALCULATED.3IONS-SCNC: 13 MMOL/L (ref 4–13)
AST SERPL W P-5'-P-CCNC: 5 U/L (ref 5–45)
BASOPHILS # BLD AUTO: 0.02 THOUSANDS/ΜL (ref 0–0.1)
BASOPHILS NFR BLD AUTO: 0 % (ref 0–1)
BILIRUB SERPL-MCNC: 0.24 MG/DL (ref 0.2–1)
BUN SERPL-MCNC: 5 MG/DL (ref 5–25)
CALCIUM ALBUM COR SERPL-MCNC: 8.9 MG/DL (ref 8.3–10.1)
CALCIUM SERPL-MCNC: 7.9 MG/DL (ref 8.3–10.1)
CHLORIDE SERPL-SCNC: 106 MMOL/L (ref 100–108)
CO2 SERPL-SCNC: 19 MMOL/L (ref 21–32)
CREAT SERPL-MCNC: 0.62 MG/DL (ref 0.6–1.3)
EOSINOPHIL # BLD AUTO: 0.15 THOUSAND/ΜL (ref 0–0.61)
EOSINOPHIL NFR BLD AUTO: 3 % (ref 0–6)
ERYTHROCYTE [DISTWIDTH] IN BLOOD BY AUTOMATED COUNT: 12.7 % (ref 11.6–15.1)
GFR SERPL CREATININE-BSD FRML MDRD: 117 ML/MIN/1.73SQ M
GLUCOSE SERPL-MCNC: 88 MG/DL (ref 65–140)
HCT VFR BLD AUTO: 31.5 % (ref 34.8–46.1)
HGB BLD-MCNC: 10.6 G/DL (ref 11.5–15.4)
IMM GRANULOCYTES # BLD AUTO: 0.04 THOUSAND/UL (ref 0–0.2)
IMM GRANULOCYTES NFR BLD AUTO: 1 % (ref 0–2)
LACTATE SERPL-SCNC: 0.4 MMOL/L (ref 0.5–2)
LYMPHOCYTES # BLD AUTO: 1.18 THOUSANDS/ΜL (ref 0.6–4.47)
LYMPHOCYTES NFR BLD AUTO: 26 % (ref 14–44)
MCH RBC QN AUTO: 29.8 PG (ref 26.8–34.3)
MCHC RBC AUTO-ENTMCNC: 33.7 G/DL (ref 31.4–37.4)
MCV RBC AUTO: 89 FL (ref 82–98)
MONOCYTES # BLD AUTO: 0.46 THOUSAND/ΜL (ref 0.17–1.22)
MONOCYTES NFR BLD AUTO: 10 % (ref 4–12)
NEUTROPHILS # BLD AUTO: 2.73 THOUSANDS/ΜL (ref 1.85–7.62)
NEUTS SEG NFR BLD AUTO: 60 % (ref 43–75)
NRBC BLD AUTO-RTO: 0 /100 WBCS
PLATELET # BLD AUTO: 261 THOUSANDS/UL (ref 149–390)
PMV BLD AUTO: 10.5 FL (ref 8.9–12.7)
POTASSIUM SERPL-SCNC: 3.3 MMOL/L (ref 3.5–5.3)
PROT SERPL-MCNC: 6.2 G/DL (ref 6.4–8.2)
RBC # BLD AUTO: 3.56 MILLION/UL (ref 3.81–5.12)
SODIUM SERPL-SCNC: 138 MMOL/L (ref 136–145)
WBC # BLD AUTO: 4.58 THOUSAND/UL (ref 4.31–10.16)

## 2020-10-26 PROCEDURE — 80053 COMPREHEN METABOLIC PANEL: CPT | Performed by: STUDENT IN AN ORGANIZED HEALTH CARE EDUCATION/TRAINING PROGRAM

## 2020-10-26 PROCEDURE — 99254 IP/OBS CNSLTJ NEW/EST MOD 60: CPT | Performed by: SURGERY

## 2020-10-26 PROCEDURE — 85025 COMPLETE CBC W/AUTO DIFF WBC: CPT | Performed by: STUDENT IN AN ORGANIZED HEALTH CARE EDUCATION/TRAINING PROGRAM

## 2020-10-26 PROCEDURE — 83605 ASSAY OF LACTIC ACID: CPT | Performed by: STUDENT IN AN ORGANIZED HEALTH CARE EDUCATION/TRAINING PROGRAM

## 2020-10-26 PROCEDURE — NC001 PR NO CHARGE: Performed by: OBSTETRICS & GYNECOLOGY

## 2020-10-26 RX ORDER — DEXTROSE, SODIUM CHLORIDE, AND POTASSIUM CHLORIDE 5; .2; .15 G/100ML; G/100ML; G/100ML
125 INJECTION INTRAVENOUS CONTINUOUS
Status: DISCONTINUED | OUTPATIENT
Start: 2020-10-26 | End: 2020-10-26

## 2020-10-26 RX ORDER — DEXTROSE, SODIUM CHLORIDE, AND POTASSIUM CHLORIDE 5; .9; .15 G/100ML; G/100ML; G/100ML
125 INJECTION INTRAVENOUS CONTINUOUS
Status: DISCONTINUED | OUTPATIENT
Start: 2020-10-26 | End: 2020-10-27

## 2020-10-26 RX ORDER — HEPARIN SODIUM 5000 [USP'U]/ML
5000 INJECTION, SOLUTION INTRAVENOUS; SUBCUTANEOUS EVERY 8 HOURS SCHEDULED
Status: DISCONTINUED | OUTPATIENT
Start: 2020-10-26 | End: 2020-11-03 | Stop reason: HOSPADM

## 2020-10-26 RX ADMIN — FAMOTIDINE 20 MG: 10 INJECTION, SOLUTION INTRAVENOUS at 18:28

## 2020-10-26 RX ADMIN — DEXTROSE, SODIUM CHLORIDE, AND POTASSIUM CHLORIDE 125 ML/HR: 5; .2; .15 INJECTION INTRAVENOUS at 08:03

## 2020-10-26 RX ADMIN — DEXTROSE, SODIUM CHLORIDE, AND POTASSIUM CHLORIDE 125 ML/HR: 5; .9; .15 INJECTION INTRAVENOUS at 18:29

## 2020-10-26 RX ADMIN — FAMOTIDINE 20 MG: 10 INJECTION, SOLUTION INTRAVENOUS at 08:10

## 2020-10-26 RX ADMIN — KETOROLAC TROMETHAMINE 30 MG: 30 INJECTION, SOLUTION INTRAMUSCULAR at 18:28

## 2020-10-26 RX ADMIN — ONDANSETRON 4 MG: 2 INJECTION INTRAMUSCULAR; INTRAVENOUS at 23:07

## 2020-10-26 RX ADMIN — KETOROLAC TROMETHAMINE 30 MG: 30 INJECTION, SOLUTION INTRAMUSCULAR at 06:00

## 2020-10-26 RX ADMIN — SODIUM CHLORIDE, SODIUM LACTATE, POTASSIUM CHLORIDE, AND CALCIUM CHLORIDE 125 ML/HR: .6; .31; .03; .02 INJECTION, SOLUTION INTRAVENOUS at 04:13

## 2020-10-26 RX ADMIN — ONDANSETRON 4 MG: 2 INJECTION INTRAMUSCULAR; INTRAVENOUS at 06:00

## 2020-10-26 RX ADMIN — Medication 1 SPRAY: at 18:28

## 2020-10-26 RX ADMIN — DEXTROSE, SODIUM CHLORIDE, AND POTASSIUM CHLORIDE 125 ML/HR: 5; .9; .15 INJECTION INTRAVENOUS at 09:46

## 2020-10-26 RX ADMIN — HEPARIN SODIUM 5000 UNITS: 5000 INJECTION INTRAVENOUS; SUBCUTANEOUS at 21:06

## 2020-10-26 RX ADMIN — HEPARIN SODIUM 5000 UNITS: 5000 INJECTION INTRAVENOUS; SUBCUTANEOUS at 13:36

## 2020-10-26 RX ADMIN — Medication 1 SPRAY: at 11:58

## 2020-10-26 RX ADMIN — ONDANSETRON 4 MG: 2 INJECTION INTRAMUSCULAR; INTRAVENOUS at 18:27

## 2020-10-26 RX ADMIN — ONDANSETRON 4 MG: 2 INJECTION INTRAMUSCULAR; INTRAVENOUS at 11:55

## 2020-10-26 RX ADMIN — DIPHENHYDRAMINE HYDROCHLORIDE 25 MG: 50 INJECTION, SOLUTION INTRAMUSCULAR; INTRAVENOUS at 23:07

## 2020-10-26 RX ADMIN — KETOROLAC TROMETHAMINE 30 MG: 30 INJECTION, SOLUTION INTRAMUSCULAR at 11:55

## 2020-10-27 ENCOUNTER — APPOINTMENT (INPATIENT)
Dept: RADIOLOGY | Facility: HOSPITAL | Age: 35
DRG: 252 | End: 2020-10-27
Payer: COMMERCIAL

## 2020-10-27 LAB
ANION GAP SERPL CALCULATED.3IONS-SCNC: 10 MMOL/L (ref 4–13)
BUN SERPL-MCNC: 3 MG/DL (ref 5–25)
CALCIUM SERPL-MCNC: 7.9 MG/DL (ref 8.3–10.1)
CHLORIDE SERPL-SCNC: 110 MMOL/L (ref 100–108)
CO2 SERPL-SCNC: 21 MMOL/L (ref 21–32)
CREAT SERPL-MCNC: 0.61 MG/DL (ref 0.6–1.3)
GFR SERPL CREATININE-BSD FRML MDRD: 118 ML/MIN/1.73SQ M
GLUCOSE SERPL-MCNC: 119 MG/DL (ref 65–140)
POTASSIUM SERPL-SCNC: 3.6 MMOL/L (ref 3.5–5.3)
SODIUM SERPL-SCNC: 141 MMOL/L (ref 136–145)

## 2020-10-27 PROCEDURE — 74022 RADEX COMPL AQT ABD SERIES: CPT

## 2020-10-27 PROCEDURE — 99231 SBSQ HOSP IP/OBS SF/LOW 25: CPT | Performed by: OBSTETRICS & GYNECOLOGY

## 2020-10-27 PROCEDURE — 80048 BASIC METABOLIC PNL TOTAL CA: CPT | Performed by: SURGERY

## 2020-10-27 PROCEDURE — 99232 SBSQ HOSP IP/OBS MODERATE 35: CPT | Performed by: SURGERY

## 2020-10-27 RX ORDER — DIPHENHYDRAMINE HYDROCHLORIDE 50 MG/ML
50 INJECTION INTRAMUSCULAR; INTRAVENOUS
Status: DISCONTINUED | OUTPATIENT
Start: 2020-10-27 | End: 2020-11-03 | Stop reason: HOSPADM

## 2020-10-27 RX ORDER — POTASSIUM CHLORIDE 29.8 MG/ML
40 INJECTION INTRAVENOUS ONCE
Status: DISCONTINUED | OUTPATIENT
Start: 2020-10-27 | End: 2020-10-27 | Stop reason: SDUPTHER

## 2020-10-27 RX ORDER — POTASSIUM CHLORIDE, DEXTROSE MONOHYDRATE AND SODIUM CHLORIDE 300; 5; 900 MG/100ML; G/100ML; MG/100ML
75 INJECTION, SOLUTION INTRAVENOUS CONTINUOUS
Status: DISCONTINUED | OUTPATIENT
Start: 2020-10-27 | End: 2020-10-31

## 2020-10-27 RX ORDER — POTASSIUM CHLORIDE 14.9 MG/ML
20 INJECTION INTRAVENOUS
Status: COMPLETED | OUTPATIENT
Start: 2020-10-27 | End: 2020-10-27

## 2020-10-27 RX ORDER — ZOLPIDEM TARTRATE 5 MG/1
5 TABLET ORAL ONCE
Status: DISCONTINUED | OUTPATIENT
Start: 2020-10-27 | End: 2020-11-03 | Stop reason: HOSPADM

## 2020-10-27 RX ORDER — LANOLIN ALCOHOL/MO/W.PET/CERES
3 CREAM (GRAM) TOPICAL
Status: DISCONTINUED | OUTPATIENT
Start: 2020-10-27 | End: 2020-10-27

## 2020-10-27 RX ORDER — PROMETHAZINE HYDROCHLORIDE 25 MG/ML
25 INJECTION, SOLUTION INTRAMUSCULAR; INTRAVENOUS ONCE
Status: COMPLETED | OUTPATIENT
Start: 2020-10-27 | End: 2020-10-27

## 2020-10-27 RX ORDER — ZOLPIDEM TARTRATE 5 MG/1
5 TABLET ORAL
Status: DISCONTINUED | OUTPATIENT
Start: 2020-10-27 | End: 2020-10-27

## 2020-10-27 RX ORDER — ZOLPIDEM TARTRATE 5 MG/1
10 TABLET ORAL ONCE
Status: DISCONTINUED | OUTPATIENT
Start: 2020-10-27 | End: 2020-10-27

## 2020-10-27 RX ADMIN — HEPARIN SODIUM 5000 UNITS: 5000 INJECTION INTRAVENOUS; SUBCUTANEOUS at 22:21

## 2020-10-27 RX ADMIN — POTASSIUM CHLORIDE 20 MEQ: 14.9 INJECTION, SOLUTION INTRAVENOUS at 11:50

## 2020-10-27 RX ADMIN — DIPHENHYDRAMINE HYDROCHLORIDE 50 MG: 50 INJECTION, SOLUTION INTRAMUSCULAR; INTRAVENOUS at 23:01

## 2020-10-27 RX ADMIN — POTASSIUM CHLORIDE 20 MEQ: 14.9 INJECTION, SOLUTION INTRAVENOUS at 09:22

## 2020-10-27 RX ADMIN — POTASSIUM CHLORIDE, DEXTROSE MONOHYDRATE AND SODIUM CHLORIDE 125 ML/HR: 300; 5; 900 INJECTION, SOLUTION INTRAVENOUS at 10:06

## 2020-10-27 RX ADMIN — Medication 1 SPRAY: at 09:23

## 2020-10-27 RX ADMIN — HEPARIN SODIUM 5000 UNITS: 5000 INJECTION INTRAVENOUS; SUBCUTANEOUS at 15:02

## 2020-10-27 RX ADMIN — POTASSIUM CHLORIDE, DEXTROSE MONOHYDRATE AND SODIUM CHLORIDE 125 ML/HR: 300; 5; 900 INJECTION, SOLUTION INTRAVENOUS at 17:32

## 2020-10-27 RX ADMIN — ONDANSETRON 4 MG: 2 INJECTION INTRAMUSCULAR; INTRAVENOUS at 23:01

## 2020-10-27 RX ADMIN — FAMOTIDINE 20 MG: 10 INJECTION, SOLUTION INTRAVENOUS at 09:22

## 2020-10-27 RX ADMIN — ONDANSETRON 4 MG: 2 INJECTION INTRAMUSCULAR; INTRAVENOUS at 17:32

## 2020-10-27 RX ADMIN — ONDANSETRON 4 MG: 2 INJECTION INTRAMUSCULAR; INTRAVENOUS at 06:23

## 2020-10-27 RX ADMIN — ONDANSETRON 4 MG: 2 INJECTION INTRAMUSCULAR; INTRAVENOUS at 11:53

## 2020-10-27 RX ADMIN — DEXTROSE, SODIUM CHLORIDE, AND POTASSIUM CHLORIDE 125 ML/HR: 5; .9; .15 INJECTION INTRAVENOUS at 02:05

## 2020-10-27 RX ADMIN — FAMOTIDINE 20 MG: 10 INJECTION, SOLUTION INTRAVENOUS at 17:32

## 2020-10-27 RX ADMIN — PROMETHAZINE HYDROCHLORIDE 25 MG: 25 INJECTION INTRAMUSCULAR; INTRAVENOUS at 02:01

## 2020-10-28 LAB
ANION GAP SERPL CALCULATED.3IONS-SCNC: 9 MMOL/L (ref 4–13)
BASOPHILS # BLD AUTO: 0.01 THOUSANDS/ΜL (ref 0–0.1)
BASOPHILS NFR BLD AUTO: 0 % (ref 0–1)
BUN SERPL-MCNC: 2 MG/DL (ref 5–25)
CALCIUM SERPL-MCNC: 8.6 MG/DL (ref 8.3–10.1)
CHLORIDE SERPL-SCNC: 107 MMOL/L (ref 100–108)
CO2 SERPL-SCNC: 23 MMOL/L (ref 21–32)
CREAT SERPL-MCNC: 0.58 MG/DL (ref 0.6–1.3)
EOSINOPHIL # BLD AUTO: 0.24 THOUSAND/ΜL (ref 0–0.61)
EOSINOPHIL NFR BLD AUTO: 5 % (ref 0–6)
ERYTHROCYTE [DISTWIDTH] IN BLOOD BY AUTOMATED COUNT: 12.7 % (ref 11.6–15.1)
GFR SERPL CREATININE-BSD FRML MDRD: 120 ML/MIN/1.73SQ M
GLUCOSE SERPL-MCNC: 100 MG/DL (ref 65–140)
HCT VFR BLD AUTO: 32.8 % (ref 34.8–46.1)
HGB BLD-MCNC: 10.9 G/DL (ref 11.5–15.4)
IMM GRANULOCYTES # BLD AUTO: 0.02 THOUSAND/UL (ref 0–0.2)
IMM GRANULOCYTES NFR BLD AUTO: 0 % (ref 0–2)
LYMPHOCYTES # BLD AUTO: 1.51 THOUSANDS/ΜL (ref 0.6–4.47)
LYMPHOCYTES NFR BLD AUTO: 32 % (ref 14–44)
MCH RBC QN AUTO: 29.8 PG (ref 26.8–34.3)
MCHC RBC AUTO-ENTMCNC: 33.2 G/DL (ref 31.4–37.4)
MCV RBC AUTO: 90 FL (ref 82–98)
MONOCYTES # BLD AUTO: 0.59 THOUSAND/ΜL (ref 0.17–1.22)
MONOCYTES NFR BLD AUTO: 12 % (ref 4–12)
NEUTROPHILS # BLD AUTO: 2.4 THOUSANDS/ΜL (ref 1.85–7.62)
NEUTS SEG NFR BLD AUTO: 51 % (ref 43–75)
NRBC BLD AUTO-RTO: 0 /100 WBCS
PLATELET # BLD AUTO: 305 THOUSANDS/UL (ref 149–390)
PMV BLD AUTO: 11.2 FL (ref 8.9–12.7)
POTASSIUM SERPL-SCNC: 3.9 MMOL/L (ref 3.5–5.3)
RBC # BLD AUTO: 3.66 MILLION/UL (ref 3.81–5.12)
SODIUM SERPL-SCNC: 139 MMOL/L (ref 136–145)
WBC # BLD AUTO: 4.77 THOUSAND/UL (ref 4.31–10.16)

## 2020-10-28 PROCEDURE — 99232 SBSQ HOSP IP/OBS MODERATE 35: CPT | Performed by: SURGERY

## 2020-10-28 PROCEDURE — 99231 SBSQ HOSP IP/OBS SF/LOW 25: CPT | Performed by: OBSTETRICS & GYNECOLOGY

## 2020-10-28 PROCEDURE — 80048 BASIC METABOLIC PNL TOTAL CA: CPT | Performed by: SURGERY

## 2020-10-28 PROCEDURE — 85025 COMPLETE CBC W/AUTO DIFF WBC: CPT | Performed by: SURGERY

## 2020-10-28 PROCEDURE — NC001 PR NO CHARGE: Performed by: OBSTETRICS & GYNECOLOGY

## 2020-10-28 RX ORDER — KETOROLAC TROMETHAMINE 30 MG/ML
30 INJECTION, SOLUTION INTRAMUSCULAR; INTRAVENOUS EVERY 6 HOURS SCHEDULED
Status: COMPLETED | OUTPATIENT
Start: 2020-10-29 | End: 2020-10-29

## 2020-10-28 RX ADMIN — HEPARIN SODIUM 5000 UNITS: 5000 INJECTION INTRAVENOUS; SUBCUTANEOUS at 21:32

## 2020-10-28 RX ADMIN — KETOROLAC TROMETHAMINE 30 MG: 30 INJECTION, SOLUTION INTRAMUSCULAR at 23:02

## 2020-10-28 RX ADMIN — ONDANSETRON 4 MG: 2 INJECTION INTRAMUSCULAR; INTRAVENOUS at 17:05

## 2020-10-28 RX ADMIN — POTASSIUM CHLORIDE, DEXTROSE MONOHYDRATE AND SODIUM CHLORIDE 125 ML/HR: 300; 5; 900 INJECTION, SOLUTION INTRAVENOUS at 14:14

## 2020-10-28 RX ADMIN — ONDANSETRON 4 MG: 2 INJECTION INTRAMUSCULAR; INTRAVENOUS at 23:03

## 2020-10-28 RX ADMIN — DIPHENHYDRAMINE HYDROCHLORIDE 50 MG: 50 INJECTION, SOLUTION INTRAMUSCULAR; INTRAVENOUS at 23:36

## 2020-10-28 RX ADMIN — FAMOTIDINE 20 MG: 10 INJECTION, SOLUTION INTRAVENOUS at 17:05

## 2020-10-28 RX ADMIN — HEPARIN SODIUM 5000 UNITS: 5000 INJECTION INTRAVENOUS; SUBCUTANEOUS at 14:09

## 2020-10-28 RX ADMIN — ONDANSETRON 4 MG: 2 INJECTION INTRAMUSCULAR; INTRAVENOUS at 12:23

## 2020-10-28 RX ADMIN — FAMOTIDINE 20 MG: 10 INJECTION, SOLUTION INTRAVENOUS at 10:22

## 2020-10-28 RX ADMIN — POTASSIUM CHLORIDE, DEXTROSE MONOHYDRATE AND SODIUM CHLORIDE 125 ML/HR: 300; 5; 900 INJECTION, SOLUTION INTRAVENOUS at 01:42

## 2020-10-28 RX ADMIN — HEPARIN SODIUM 5000 UNITS: 5000 INJECTION INTRAVENOUS; SUBCUTANEOUS at 05:14

## 2020-10-28 RX ADMIN — POTASSIUM CHLORIDE, DEXTROSE MONOHYDRATE AND SODIUM CHLORIDE 125 ML/HR: 300; 5; 900 INJECTION, SOLUTION INTRAVENOUS at 22:49

## 2020-10-29 ENCOUNTER — APPOINTMENT (INPATIENT)
Dept: RADIOLOGY | Facility: HOSPITAL | Age: 35
DRG: 252 | End: 2020-10-29
Payer: COMMERCIAL

## 2020-10-29 ENCOUNTER — APPOINTMENT (INPATIENT)
Dept: VASCULAR ULTRASOUND | Facility: HOSPITAL | Age: 35
DRG: 252 | End: 2020-10-29
Payer: COMMERCIAL

## 2020-10-29 PROBLEM — E46 PROTEIN-CALORIE MALNUTRITION (HCC): Status: ACTIVE | Noted: 2020-10-29

## 2020-10-29 LAB
ALBUMIN SERPL BCP-MCNC: 2.8 G/DL (ref 3.5–5)
ALP SERPL-CCNC: 61 U/L (ref 46–116)
ALT SERPL W P-5'-P-CCNC: 13 U/L (ref 12–78)
ANION GAP SERPL CALCULATED.3IONS-SCNC: 10 MMOL/L (ref 4–13)
AST SERPL W P-5'-P-CCNC: 9 U/L (ref 5–45)
BASOPHILS # BLD AUTO: 0.03 THOUSANDS/ΜL (ref 0–0.1)
BASOPHILS NFR BLD AUTO: 1 % (ref 0–1)
BILIRUB SERPL-MCNC: 0.27 MG/DL (ref 0.2–1)
BUN SERPL-MCNC: 3 MG/DL (ref 5–25)
CALCIUM ALBUM COR SERPL-MCNC: 9.4 MG/DL (ref 8.3–10.1)
CALCIUM SERPL-MCNC: 8.4 MG/DL (ref 8.3–10.1)
CHLORIDE SERPL-SCNC: 106 MMOL/L (ref 100–108)
CO2 SERPL-SCNC: 24 MMOL/L (ref 21–32)
CREAT SERPL-MCNC: 0.54 MG/DL (ref 0.6–1.3)
EOSINOPHIL # BLD AUTO: 0.25 THOUSAND/ΜL (ref 0–0.61)
EOSINOPHIL NFR BLD AUTO: 5 % (ref 0–6)
ERYTHROCYTE [DISTWIDTH] IN BLOOD BY AUTOMATED COUNT: 12.6 % (ref 11.6–15.1)
GFR SERPL CREATININE-BSD FRML MDRD: 123 ML/MIN/1.73SQ M
GLUCOSE SERPL-MCNC: 109 MG/DL (ref 65–140)
HCT VFR BLD AUTO: 33.6 % (ref 34.8–46.1)
HGB BLD-MCNC: 11 G/DL (ref 11.5–15.4)
IMM GRANULOCYTES # BLD AUTO: 0.03 THOUSAND/UL (ref 0–0.2)
IMM GRANULOCYTES NFR BLD AUTO: 1 % (ref 0–2)
LYMPHOCYTES # BLD AUTO: 1.72 THOUSANDS/ΜL (ref 0.6–4.47)
LYMPHOCYTES NFR BLD AUTO: 34 % (ref 14–44)
MCH RBC QN AUTO: 29.3 PG (ref 26.8–34.3)
MCHC RBC AUTO-ENTMCNC: 32.7 G/DL (ref 31.4–37.4)
MCV RBC AUTO: 90 FL (ref 82–98)
MONOCYTES # BLD AUTO: 0.59 THOUSAND/ΜL (ref 0.17–1.22)
MONOCYTES NFR BLD AUTO: 12 % (ref 4–12)
NEUTROPHILS # BLD AUTO: 2.51 THOUSANDS/ΜL (ref 1.85–7.62)
NEUTS SEG NFR BLD AUTO: 47 % (ref 43–75)
NRBC BLD AUTO-RTO: 0 /100 WBCS
PLATELET # BLD AUTO: 304 THOUSANDS/UL (ref 149–390)
PMV BLD AUTO: 10.7 FL (ref 8.9–12.7)
POTASSIUM SERPL-SCNC: 4 MMOL/L (ref 3.5–5.3)
PROT SERPL-MCNC: 6.7 G/DL (ref 6.4–8.2)
RBC # BLD AUTO: 3.75 MILLION/UL (ref 3.81–5.12)
SODIUM SERPL-SCNC: 140 MMOL/L (ref 136–145)
WBC # BLD AUTO: 5.13 THOUSAND/UL (ref 4.31–10.16)

## 2020-10-29 PROCEDURE — 99232 SBSQ HOSP IP/OBS MODERATE 35: CPT | Performed by: SURGERY

## 2020-10-29 PROCEDURE — 74018 RADEX ABDOMEN 1 VIEW: CPT

## 2020-10-29 PROCEDURE — 71045 X-RAY EXAM CHEST 1 VIEW: CPT

## 2020-10-29 PROCEDURE — 99231 SBSQ HOSP IP/OBS SF/LOW 25: CPT | Performed by: STUDENT IN AN ORGANIZED HEALTH CARE EDUCATION/TRAINING PROGRAM

## 2020-10-29 PROCEDURE — 93971 EXTREMITY STUDY: CPT

## 2020-10-29 PROCEDURE — 80053 COMPREHEN METABOLIC PANEL: CPT | Performed by: STUDENT IN AN ORGANIZED HEALTH CARE EDUCATION/TRAINING PROGRAM

## 2020-10-29 PROCEDURE — 85025 COMPLETE CBC W/AUTO DIFF WBC: CPT | Performed by: STUDENT IN AN ORGANIZED HEALTH CARE EDUCATION/TRAINING PROGRAM

## 2020-10-29 PROCEDURE — 74022 RADEX COMPL AQT ABD SERIES: CPT

## 2020-10-29 RX ORDER — HYDROMORPHONE HCL/PF 1 MG/ML
0.5 SYRINGE (ML) INJECTION ONCE
Status: DISCONTINUED | OUTPATIENT
Start: 2020-10-29 | End: 2020-11-03 | Stop reason: HOSPADM

## 2020-10-29 RX ORDER — HYDROMORPHONE HCL/PF 1 MG/ML
1 SYRINGE (ML) INJECTION ONCE
Status: COMPLETED | OUTPATIENT
Start: 2020-10-29 | End: 2020-10-29

## 2020-10-29 RX ORDER — METOCLOPRAMIDE HYDROCHLORIDE 5 MG/ML
10 INJECTION INTRAMUSCULAR; INTRAVENOUS EVERY 6 HOURS PRN
Status: DISCONTINUED | OUTPATIENT
Start: 2020-10-29 | End: 2020-11-03 | Stop reason: HOSPADM

## 2020-10-29 RX ORDER — PROMETHAZINE HYDROCHLORIDE 25 MG/ML
12.5 INJECTION, SOLUTION INTRAMUSCULAR; INTRAVENOUS ONCE
Status: COMPLETED | OUTPATIENT
Start: 2020-10-29 | End: 2020-10-29

## 2020-10-29 RX ORDER — LORAZEPAM 2 MG/ML
0.5 INJECTION INTRAMUSCULAR ONCE
Status: COMPLETED | OUTPATIENT
Start: 2020-10-29 | End: 2020-10-29

## 2020-10-29 RX ADMIN — POTASSIUM CHLORIDE, DEXTROSE MONOHYDRATE AND SODIUM CHLORIDE 125 ML/HR: 300; 5; 900 INJECTION, SOLUTION INTRAVENOUS at 21:45

## 2020-10-29 RX ADMIN — KETOROLAC TROMETHAMINE 30 MG: 30 INJECTION, SOLUTION INTRAMUSCULAR at 12:30

## 2020-10-29 RX ADMIN — DIPHENHYDRAMINE HYDROCHLORIDE 50 MG: 50 INJECTION, SOLUTION INTRAMUSCULAR; INTRAVENOUS at 22:59

## 2020-10-29 RX ADMIN — KETOROLAC TROMETHAMINE 30 MG: 30 INJECTION, SOLUTION INTRAMUSCULAR at 18:18

## 2020-10-29 RX ADMIN — ONDANSETRON 4 MG: 2 INJECTION INTRAMUSCULAR; INTRAVENOUS at 05:00

## 2020-10-29 RX ADMIN — HEPARIN SODIUM 5000 UNITS: 5000 INJECTION INTRAVENOUS; SUBCUTANEOUS at 15:46

## 2020-10-29 RX ADMIN — KETOROLAC TROMETHAMINE 30 MG: 30 INJECTION, SOLUTION INTRAMUSCULAR at 05:00

## 2020-10-29 RX ADMIN — HEPARIN SODIUM 5000 UNITS: 5000 INJECTION INTRAVENOUS; SUBCUTANEOUS at 05:00

## 2020-10-29 RX ADMIN — POTASSIUM CHLORIDE, DEXTROSE MONOHYDRATE AND SODIUM CHLORIDE 125 ML/HR: 300; 5; 900 INJECTION, SOLUTION INTRAVENOUS at 08:24

## 2020-10-29 RX ADMIN — HEPARIN SODIUM 5000 UNITS: 5000 INJECTION INTRAVENOUS; SUBCUTANEOUS at 21:47

## 2020-10-29 RX ADMIN — PROMETHAZINE HYDROCHLORIDE 12.5 MG: 25 INJECTION INTRAMUSCULAR; INTRAVENOUS at 22:59

## 2020-10-29 RX ADMIN — HYDROMORPHONE HYDROCHLORIDE 1 MG: 1 INJECTION, SOLUTION INTRAMUSCULAR; INTRAVENOUS; SUBCUTANEOUS at 16:45

## 2020-10-29 RX ADMIN — METOCLOPRAMIDE HYDROCHLORIDE 10 MG: 5 INJECTION INTRAMUSCULAR; INTRAVENOUS at 10:50

## 2020-10-29 RX ADMIN — LORAZEPAM 0.5 MG: 2 INJECTION INTRAMUSCULAR; INTRAVENOUS at 12:29

## 2020-10-29 RX ADMIN — FAMOTIDINE 20 MG: 10 INJECTION, SOLUTION INTRAVENOUS at 18:17

## 2020-10-29 RX ADMIN — METOCLOPRAMIDE HYDROCHLORIDE 10 MG: 5 INJECTION INTRAMUSCULAR; INTRAVENOUS at 18:18

## 2020-10-29 RX ADMIN — FAMOTIDINE 20 MG: 10 INJECTION, SOLUTION INTRAVENOUS at 08:08

## 2020-10-30 ENCOUNTER — APPOINTMENT (INPATIENT)
Dept: RADIOLOGY | Facility: HOSPITAL | Age: 35
DRG: 252 | End: 2020-10-30
Attending: RADIOLOGY
Payer: COMMERCIAL

## 2020-10-30 LAB
ALBUMIN SERPL BCP-MCNC: 3 G/DL (ref 3.5–5)
ALP SERPL-CCNC: 68 U/L (ref 46–116)
ALT SERPL W P-5'-P-CCNC: 14 U/L (ref 12–78)
ANION GAP SERPL CALCULATED.3IONS-SCNC: 11 MMOL/L (ref 4–13)
AST SERPL W P-5'-P-CCNC: 8 U/L (ref 5–45)
BILIRUB SERPL-MCNC: 0.24 MG/DL (ref 0.2–1)
BUN SERPL-MCNC: 5 MG/DL (ref 5–25)
CALCIUM ALBUM COR SERPL-MCNC: 9.4 MG/DL (ref 8.3–10.1)
CALCIUM SERPL-MCNC: 8.6 MG/DL (ref 8.3–10.1)
CHLORIDE SERPL-SCNC: 108 MMOL/L (ref 100–108)
CO2 SERPL-SCNC: 23 MMOL/L (ref 21–32)
CREAT SERPL-MCNC: 0.62 MG/DL (ref 0.6–1.3)
GFR SERPL CREATININE-BSD FRML MDRD: 117 ML/MIN/1.73SQ M
GLUCOSE SERPL-MCNC: 100 MG/DL (ref 65–140)
MAGNESIUM SERPL-MCNC: 1.8 MG/DL (ref 1.6–2.6)
PHOSPHATE SERPL-MCNC: 2.6 MG/DL (ref 2.7–4.5)
POTASSIUM SERPL-SCNC: 4 MMOL/L (ref 3.5–5.3)
PROT SERPL-MCNC: 6.9 G/DL (ref 6.4–8.2)
SODIUM SERPL-SCNC: 142 MMOL/L (ref 136–145)

## 2020-10-30 PROCEDURE — 99231 SBSQ HOSP IP/OBS SF/LOW 25: CPT | Performed by: STUDENT IN AN ORGANIZED HEALTH CARE EDUCATION/TRAINING PROGRAM

## 2020-10-30 PROCEDURE — 83735 ASSAY OF MAGNESIUM: CPT | Performed by: OBSTETRICS & GYNECOLOGY

## 2020-10-30 PROCEDURE — 99232 SBSQ HOSP IP/OBS MODERATE 35: CPT | Performed by: SURGERY

## 2020-10-30 PROCEDURE — 80053 COMPREHEN METABOLIC PANEL: CPT | Performed by: OBSTETRICS & GYNECOLOGY

## 2020-10-30 PROCEDURE — 93971 EXTREMITY STUDY: CPT | Performed by: SURGERY

## 2020-10-30 PROCEDURE — 36571 INSERT PICVAD CATH: CPT | Performed by: RADIOLOGY

## 2020-10-30 PROCEDURE — 02HV33Z INSERTION OF INFUSION DEVICE INTO SUPERIOR VENA CAVA, PERCUTANEOUS APPROACH: ICD-10-PCS | Performed by: RADIOLOGY

## 2020-10-30 PROCEDURE — 84100 ASSAY OF PHOSPHORUS: CPT | Performed by: OBSTETRICS & GYNECOLOGY

## 2020-10-30 PROCEDURE — 36573 INSJ PICC RS&I 5 YR+: CPT

## 2020-10-30 PROCEDURE — B518YZA FLUOROSCOPY OF SUPERIOR VENA CAVA USING OTHER CONTRAST, GUIDANCE: ICD-10-PCS | Performed by: RADIOLOGY

## 2020-10-30 RX ORDER — LIDOCAINE WITH 8.4% SOD BICARB 0.9%(10ML)
SYRINGE (ML) INJECTION CODE/TRAUMA/SEDATION MEDICATION
Status: COMPLETED | OUTPATIENT
Start: 2020-10-30 | End: 2020-10-30

## 2020-10-30 RX ADMIN — ONDANSETRON 4 MG: 2 INJECTION INTRAMUSCULAR; INTRAVENOUS at 23:53

## 2020-10-30 RX ADMIN — HEPARIN SODIUM 5000 UNITS: 5000 INJECTION INTRAVENOUS; SUBCUTANEOUS at 05:18

## 2020-10-30 RX ADMIN — DIPHENHYDRAMINE HYDROCHLORIDE 50 MG: 50 INJECTION, SOLUTION INTRAMUSCULAR; INTRAVENOUS at 22:17

## 2020-10-30 RX ADMIN — FAMOTIDINE 20 MG: 10 INJECTION, SOLUTION INTRAVENOUS at 17:33

## 2020-10-30 RX ADMIN — Medication: at 21:54

## 2020-10-30 RX ADMIN — HEPARIN SODIUM 5000 UNITS: 5000 INJECTION INTRAVENOUS; SUBCUTANEOUS at 22:14

## 2020-10-30 RX ADMIN — Medication: at 12:41

## 2020-10-30 RX ADMIN — POTASSIUM CHLORIDE, DEXTROSE MONOHYDRATE AND SODIUM CHLORIDE 75 ML/HR: 300; 5; 900 INJECTION, SOLUTION INTRAVENOUS at 20:43

## 2020-10-30 RX ADMIN — ONDANSETRON 4 MG: 2 INJECTION INTRAMUSCULAR; INTRAVENOUS at 01:06

## 2020-10-30 RX ADMIN — POTASSIUM CHLORIDE, DEXTROSE MONOHYDRATE AND SODIUM CHLORIDE 125 ML/HR: 300; 5; 900 INJECTION, SOLUTION INTRAVENOUS at 05:32

## 2020-10-30 RX ADMIN — Medication 2 ML: at 10:02

## 2020-10-30 RX ADMIN — SODIUM CHLORIDE, SODIUM LACTATE, POTASSIUM CHLORIDE, AND CALCIUM CHLORIDE 1000 ML: .6; .31; .03; .02 INJECTION, SOLUTION INTRAVENOUS at 09:13

## 2020-10-30 RX ADMIN — ONDANSETRON 4 MG: 2 INJECTION INTRAMUSCULAR; INTRAVENOUS at 12:37

## 2020-10-30 RX ADMIN — FAMOTIDINE 20 MG: 10 INJECTION, SOLUTION INTRAVENOUS at 08:52

## 2020-10-30 RX ADMIN — HEPARIN SODIUM 5000 UNITS: 5000 INJECTION INTRAVENOUS; SUBCUTANEOUS at 14:36

## 2020-10-30 RX ADMIN — ONDANSETRON 4 MG: 2 INJECTION INTRAMUSCULAR; INTRAVENOUS at 05:18

## 2020-10-30 RX ADMIN — ONDANSETRON 4 MG: 2 INJECTION INTRAMUSCULAR; INTRAVENOUS at 17:33

## 2020-10-31 ENCOUNTER — APPOINTMENT (INPATIENT)
Dept: RADIOLOGY | Facility: HOSPITAL | Age: 35
DRG: 252 | End: 2020-10-31
Payer: COMMERCIAL

## 2020-10-31 LAB
ALBUMIN SERPL BCP-MCNC: 2.7 G/DL (ref 3.5–5)
ALP SERPL-CCNC: 61 U/L (ref 46–116)
ALT SERPL W P-5'-P-CCNC: 11 U/L (ref 12–78)
ANION GAP SERPL CALCULATED.3IONS-SCNC: 10 MMOL/L (ref 4–13)
AST SERPL W P-5'-P-CCNC: 14 U/L (ref 5–45)
BACTERIA UR QL AUTO: ABNORMAL /HPF
BILIRUB SERPL-MCNC: 0.19 MG/DL (ref 0.2–1)
BILIRUB UR QL STRIP: NEGATIVE
BUN SERPL-MCNC: 4 MG/DL (ref 5–25)
CALCIUM ALBUM COR SERPL-MCNC: 9.4 MG/DL (ref 8.3–10.1)
CALCIUM SERPL-MCNC: 8.4 MG/DL (ref 8.3–10.1)
CHLORIDE SERPL-SCNC: 102 MMOL/L (ref 100–108)
CLARITY UR: CLEAR
CO2 SERPL-SCNC: 24 MMOL/L (ref 21–32)
COLOR UR: ABNORMAL
CREAT SERPL-MCNC: 0.7 MG/DL (ref 0.6–1.3)
GFR SERPL CREATININE-BSD FRML MDRD: 113 ML/MIN/1.73SQ M
GLUCOSE SERPL-MCNC: 101 MG/DL (ref 65–140)
GLUCOSE SERPL-MCNC: 572 MG/DL (ref 65–140)
GLUCOSE SERPL-MCNC: 86 MG/DL (ref 65–140)
GLUCOSE UR STRIP-MCNC: NEGATIVE MG/DL
HGB UR QL STRIP.AUTO: ABNORMAL
KETONES UR STRIP-MCNC: NEGATIVE MG/DL
LEUKOCYTE ESTERASE UR QL STRIP: NEGATIVE
MAGNESIUM SERPL-MCNC: 2.2 MG/DL (ref 1.6–2.6)
NITRITE UR QL STRIP: NEGATIVE
NON-SQ EPI CELLS URNS QL MICRO: ABNORMAL /HPF
PH UR STRIP.AUTO: 8 [PH]
PHOSPHATE SERPL-MCNC: 5.1 MG/DL (ref 2.7–4.5)
POTASSIUM SERPL-SCNC: 4.7 MMOL/L (ref 3.5–5.3)
PROT SERPL-MCNC: 6.4 G/DL (ref 6.4–8.2)
PROT UR STRIP-MCNC: NEGATIVE MG/DL
RBC #/AREA URNS AUTO: ABNORMAL /HPF
SODIUM SERPL-SCNC: 136 MMOL/L (ref 136–145)
SP GR UR STRIP.AUTO: 1.02 (ref 1–1.03)
UROBILINOGEN UR QL STRIP.AUTO: 0.2 E.U./DL
WBC #/AREA URNS AUTO: ABNORMAL /HPF

## 2020-10-31 PROCEDURE — 80053 COMPREHEN METABOLIC PANEL: CPT | Performed by: OBSTETRICS & GYNECOLOGY

## 2020-10-31 PROCEDURE — 99232 SBSQ HOSP IP/OBS MODERATE 35: CPT | Performed by: SURGERY

## 2020-10-31 PROCEDURE — 99231 SBSQ HOSP IP/OBS SF/LOW 25: CPT | Performed by: STUDENT IN AN ORGANIZED HEALTH CARE EDUCATION/TRAINING PROGRAM

## 2020-10-31 PROCEDURE — 82948 REAGENT STRIP/BLOOD GLUCOSE: CPT

## 2020-10-31 PROCEDURE — 81001 URINALYSIS AUTO W/SCOPE: CPT | Performed by: OBSTETRICS & GYNECOLOGY

## 2020-10-31 PROCEDURE — 3E0436Z INTRODUCTION OF NUTRITIONAL SUBSTANCE INTO CENTRAL VEIN, PERCUTANEOUS APPROACH: ICD-10-PCS | Performed by: OBSTETRICS & GYNECOLOGY

## 2020-10-31 PROCEDURE — 74018 RADEX ABDOMEN 1 VIEW: CPT

## 2020-10-31 PROCEDURE — 84100 ASSAY OF PHOSPHORUS: CPT | Performed by: OBSTETRICS & GYNECOLOGY

## 2020-10-31 PROCEDURE — 83735 ASSAY OF MAGNESIUM: CPT | Performed by: OBSTETRICS & GYNECOLOGY

## 2020-10-31 RX ORDER — DEXTROSE, SODIUM CHLORIDE, AND POTASSIUM CHLORIDE 5; .9; .15 G/100ML; G/100ML; G/100ML
75 INJECTION INTRAVENOUS CONTINUOUS
Status: DISCONTINUED | OUTPATIENT
Start: 2020-10-31 | End: 2020-11-03 | Stop reason: HOSPADM

## 2020-10-31 RX ADMIN — FAMOTIDINE 20 MG: 10 INJECTION, SOLUTION INTRAVENOUS at 17:28

## 2020-10-31 RX ADMIN — HEPARIN SODIUM 5000 UNITS: 5000 INJECTION INTRAVENOUS; SUBCUTANEOUS at 21:26

## 2020-10-31 RX ADMIN — HEPARIN SODIUM 5000 UNITS: 5000 INJECTION INTRAVENOUS; SUBCUTANEOUS at 05:35

## 2020-10-31 RX ADMIN — FAMOTIDINE 20 MG: 10 INJECTION, SOLUTION INTRAVENOUS at 08:39

## 2020-10-31 RX ADMIN — DIPHENHYDRAMINE HYDROCHLORIDE 50 MG: 50 INJECTION, SOLUTION INTRAMUSCULAR; INTRAVENOUS at 23:03

## 2020-10-31 RX ADMIN — HEPARIN SODIUM 5000 UNITS: 5000 INJECTION INTRAVENOUS; SUBCUTANEOUS at 14:28

## 2020-10-31 RX ADMIN — DEXTROSE, SODIUM CHLORIDE, AND POTASSIUM CHLORIDE 75 ML/HR: 5; .9; .15 INJECTION INTRAVENOUS at 12:18

## 2020-10-31 RX ADMIN — POTASSIUM CHLORIDE, DEXTROSE MONOHYDRATE AND SODIUM CHLORIDE 75 ML/HR: 300; 5; 900 INJECTION, SOLUTION INTRAVENOUS at 09:41

## 2020-10-31 RX ADMIN — Medication: at 21:27

## 2020-11-01 PROCEDURE — 99231 SBSQ HOSP IP/OBS SF/LOW 25: CPT | Performed by: OBSTETRICS & GYNECOLOGY

## 2020-11-01 PROCEDURE — 99232 SBSQ HOSP IP/OBS MODERATE 35: CPT | Performed by: SURGERY

## 2020-11-01 RX ADMIN — DIPHENHYDRAMINE HYDROCHLORIDE 50 MG: 50 INJECTION, SOLUTION INTRAMUSCULAR; INTRAVENOUS at 23:01

## 2020-11-01 RX ADMIN — ONDANSETRON 4 MG: 2 INJECTION INTRAMUSCULAR; INTRAVENOUS at 17:33

## 2020-11-01 RX ADMIN — HEPARIN SODIUM 5000 UNITS: 5000 INJECTION INTRAVENOUS; SUBCUTANEOUS at 22:29

## 2020-11-01 RX ADMIN — HEPARIN SODIUM 5000 UNITS: 5000 INJECTION INTRAVENOUS; SUBCUTANEOUS at 05:11

## 2020-11-01 RX ADMIN — DEXTROSE, SODIUM CHLORIDE, AND POTASSIUM CHLORIDE 75 ML/HR: 5; .9; .15 INJECTION INTRAVENOUS at 01:00

## 2020-11-01 RX ADMIN — FAMOTIDINE 20 MG: 10 INJECTION, SOLUTION INTRAVENOUS at 08:43

## 2020-11-01 RX ADMIN — DEXTROSE, SODIUM CHLORIDE, AND POTASSIUM CHLORIDE 75 ML/HR: 5; .9; .15 INJECTION INTRAVENOUS at 14:21

## 2020-11-01 RX ADMIN — HEPARIN SODIUM 5000 UNITS: 5000 INJECTION INTRAVENOUS; SUBCUTANEOUS at 14:11

## 2020-11-01 RX ADMIN — Medication: at 22:29

## 2020-11-02 VITALS
SYSTOLIC BLOOD PRESSURE: 107 MMHG | BODY MASS INDEX: 29.66 KG/M2 | HEIGHT: 62 IN | HEART RATE: 86 BPM | WEIGHT: 161.16 LBS | DIASTOLIC BLOOD PRESSURE: 59 MMHG | OXYGEN SATURATION: 95 % | TEMPERATURE: 98.2 F | RESPIRATION RATE: 18 BRPM

## 2020-11-02 LAB
ALBUMIN SERPL BCP-MCNC: 2.8 G/DL (ref 3.5–5)
ALP SERPL-CCNC: 59 U/L (ref 46–116)
ALT SERPL W P-5'-P-CCNC: 14 U/L (ref 12–78)
ANION GAP SERPL CALCULATED.3IONS-SCNC: 9 MMOL/L (ref 4–13)
AST SERPL W P-5'-P-CCNC: 10 U/L (ref 5–45)
BILIRUB SERPL-MCNC: 0.16 MG/DL (ref 0.2–1)
BUN SERPL-MCNC: 8 MG/DL (ref 5–25)
CALCIUM ALBUM COR SERPL-MCNC: 9.1 MG/DL (ref 8.3–10.1)
CALCIUM SERPL-MCNC: 8.1 MG/DL (ref 8.3–10.1)
CHLORIDE SERPL-SCNC: 107 MMOL/L (ref 100–108)
CO2 SERPL-SCNC: 24 MMOL/L (ref 21–32)
CREAT SERPL-MCNC: 0.67 MG/DL (ref 0.6–1.3)
GFR SERPL CREATININE-BSD FRML MDRD: 114 ML/MIN/1.73SQ M
GLUCOSE SERPL-MCNC: 116 MG/DL (ref 65–140)
MAGNESIUM SERPL-MCNC: 2.2 MG/DL (ref 1.6–2.6)
PHOSPHATE SERPL-MCNC: 3.2 MG/DL (ref 2.7–4.5)
POTASSIUM SERPL-SCNC: 3.9 MMOL/L (ref 3.5–5.3)
PROT SERPL-MCNC: 6.3 G/DL (ref 6.4–8.2)
SODIUM SERPL-SCNC: 140 MMOL/L (ref 136–145)

## 2020-11-02 PROCEDURE — 83735 ASSAY OF MAGNESIUM: CPT | Performed by: OBSTETRICS & GYNECOLOGY

## 2020-11-02 PROCEDURE — 84100 ASSAY OF PHOSPHORUS: CPT | Performed by: OBSTETRICS & GYNECOLOGY

## 2020-11-02 PROCEDURE — 80053 COMPREHEN METABOLIC PANEL: CPT | Performed by: OBSTETRICS & GYNECOLOGY

## 2020-11-02 PROCEDURE — 99231 SBSQ HOSP IP/OBS SF/LOW 25: CPT | Performed by: OBSTETRICS & GYNECOLOGY

## 2020-11-02 PROCEDURE — 99232 SBSQ HOSP IP/OBS MODERATE 35: CPT | Performed by: SURGERY

## 2020-11-02 RX ORDER — ACETAMINOPHEN 325 MG/1
650 TABLET ORAL EVERY 6 HOURS PRN
Status: DISCONTINUED | OUTPATIENT
Start: 2020-11-02 | End: 2020-11-03 | Stop reason: HOSPADM

## 2020-11-02 RX ADMIN — FAMOTIDINE 20 MG: 20 TABLET ORAL at 08:03

## 2020-11-02 RX ADMIN — DEXTROSE, SODIUM CHLORIDE, AND POTASSIUM CHLORIDE 75 ML/HR: 5; .9; .15 INJECTION INTRAVENOUS at 16:51

## 2020-11-02 RX ADMIN — DEXTROSE, SODIUM CHLORIDE, AND POTASSIUM CHLORIDE 75 ML/HR: 5; .9; .15 INJECTION INTRAVENOUS at 04:42

## 2020-11-02 RX ADMIN — HEPARIN SODIUM 5000 UNITS: 5000 INJECTION INTRAVENOUS; SUBCUTANEOUS at 15:33

## 2020-11-02 RX ADMIN — FAMOTIDINE 20 MG: 20 TABLET ORAL at 16:51

## 2020-11-02 RX ADMIN — HEPARIN SODIUM 5000 UNITS: 5000 INJECTION INTRAVENOUS; SUBCUTANEOUS at 21:23

## 2020-11-02 RX ADMIN — DIPHENHYDRAMINE HYDROCHLORIDE 50 MG: 50 INJECTION, SOLUTION INTRAMUSCULAR; INTRAVENOUS at 23:23

## 2020-11-02 RX ADMIN — HEPARIN SODIUM 5000 UNITS: 5000 INJECTION INTRAVENOUS; SUBCUTANEOUS at 05:43

## 2020-11-03 PROCEDURE — 99238 HOSP IP/OBS DSCHRG MGMT 30/<: CPT | Performed by: OBSTETRICS & GYNECOLOGY

## 2020-11-03 PROCEDURE — 99232 SBSQ HOSP IP/OBS MODERATE 35: CPT | Performed by: SURGERY

## 2020-11-03 RX ADMIN — FAMOTIDINE 20 MG: 20 TABLET ORAL at 08:36

## 2020-11-03 RX ADMIN — DEXTROSE, SODIUM CHLORIDE, AND POTASSIUM CHLORIDE 75 ML/HR: 5; .9; .15 INJECTION INTRAVENOUS at 04:17

## 2020-11-05 ENCOUNTER — OFFICE VISIT (OUTPATIENT)
Dept: OBGYN CLINIC | Age: 35
End: 2020-11-05

## 2020-11-05 VITALS
BODY MASS INDEX: 29.7 KG/M2 | TEMPERATURE: 97.8 F | DIASTOLIC BLOOD PRESSURE: 66 MMHG | SYSTOLIC BLOOD PRESSURE: 102 MMHG | WEIGHT: 162.4 LBS

## 2020-11-05 DIAGNOSIS — K56.609 SMALL BOWEL OBSTRUCTION (HCC): ICD-10-CM

## 2020-11-05 DIAGNOSIS — Z90.710 S/P LAPAROSCOPIC HYSTERECTOMY: Primary | ICD-10-CM

## 2020-11-05 PROCEDURE — 99024 POSTOP FOLLOW-UP VISIT: CPT | Performed by: STUDENT IN AN ORGANIZED HEALTH CARE EDUCATION/TRAINING PROGRAM

## 2020-11-27 ENCOUNTER — DOCUMENTATION (OUTPATIENT)
Dept: LABOR AND DELIVERY | Facility: HOSPITAL | Age: 35
End: 2020-11-27

## 2020-11-27 ENCOUNTER — TELEPHONE (OUTPATIENT)
Dept: OTHER | Facility: OTHER | Age: 35
End: 2020-11-27

## 2020-12-03 ENCOUNTER — OFFICE VISIT (OUTPATIENT)
Dept: OBGYN CLINIC | Age: 35
End: 2020-12-03
Payer: COMMERCIAL

## 2020-12-03 VITALS — DIASTOLIC BLOOD PRESSURE: 82 MMHG | BODY MASS INDEX: 29.96 KG/M2 | WEIGHT: 163.8 LBS | SYSTOLIC BLOOD PRESSURE: 114 MMHG

## 2020-12-03 DIAGNOSIS — Z90.710 S/P LAPAROSCOPIC HYSTERECTOMY: Primary | ICD-10-CM

## 2020-12-03 DIAGNOSIS — K56.609 SMALL BOWEL OBSTRUCTION (HCC): ICD-10-CM

## 2020-12-03 PROCEDURE — 99213 OFFICE O/P EST LOW 20 MIN: CPT | Performed by: STUDENT IN AN ORGANIZED HEALTH CARE EDUCATION/TRAINING PROGRAM

## 2020-12-16 ENCOUNTER — PREP FOR PROCEDURE (OUTPATIENT)
Dept: GASTROENTEROLOGY | Facility: CLINIC | Age: 35
End: 2020-12-16

## 2020-12-16 DIAGNOSIS — A09 DIARRHEA OF INFECTIOUS ORIGIN: Primary | ICD-10-CM

## 2021-01-07 ENCOUNTER — OFFICE VISIT (OUTPATIENT)
Dept: OBGYN CLINIC | Age: 36
End: 2021-01-07

## 2021-01-07 VITALS — DIASTOLIC BLOOD PRESSURE: 80 MMHG | SYSTOLIC BLOOD PRESSURE: 122 MMHG | BODY MASS INDEX: 29.81 KG/M2 | WEIGHT: 163 LBS

## 2021-01-07 DIAGNOSIS — K56.609 SMALL BOWEL OBSTRUCTION (HCC): ICD-10-CM

## 2021-01-07 DIAGNOSIS — Z90.710 S/P LAPAROSCOPIC HYSTERECTOMY: Primary | ICD-10-CM

## 2021-01-07 PROCEDURE — 99024 POSTOP FOLLOW-UP VISIT: CPT | Performed by: STUDENT IN AN ORGANIZED HEALTH CARE EDUCATION/TRAINING PROGRAM
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
## 2021-01-07 NOTE — ASSESSMENT & PLAN NOTE
Feeling well postoperatively, no more cramping or pain!   No additional episodes of vaginal bleeding  Well-healed trocar sites, vaginal cuff intact and healed  Cleared from pelvic rest today

## 2021-04-12 PROBLEM — Z90.49 S/P RIGHT HEMICOLECTOMY: Status: ACTIVE | Noted: 2021-04-12

## 2021-04-12 NOTE — PROGRESS NOTES
[facility] - Hematology & Medical Oncology  Outpatient Visit Encounter Note    Lizett Barry 39 y o  female [id number] Date:  4/14/2021      Fanta Allen is a 39 y o  transferring her care from Dr Trey Joel  She was last seen by him on 10/13/2020 as an office visit  She was being managed for colon cancer  In review of the chart and talking with the patient, she was diagnosed with [id number] Stage 2A in Fall 2019  She underwent right hemicolectomy and has been on surveillance since  She had an unplanned CT abdomen pelvis on 06/12/2020 for diarrhea assessment that did not show any disease recurrence  Her last CEA was normal on 9/30/2020  She has history of hysterectomy for fibroids and had post-op SBO and resolved then  However, she tells me that she has issues with pelvic pain now and has noticed blood in the stool  She tells me that she has noticed vague symptoms with on and off dyspnea as well  She tells me she is trying to make an appointment gastroenterology to see them as recommended by her PCP  I have reviewed the relevant past medical, surgical, social and family history  I have also reviewed allergies and medications for this patient  Review of Systems  Review of Systems   All other systems reviewed and are negative  OBJECTIVE     Physical Exam  Vitals:    04/14/21 0959   BP: 128/84   BP Location: Left arm   Patient Position: Sitting   Cuff Size: Adult   Pulse: 104   Resp: 18   Temp: 99 1 °F (37 3 °C)   TempSrc: Tympanic   SpO2: 97%   Weight: 72 6 kg (160 lb)   Height: 5' 2" (1 575 m)       Physical Exam  Vitals signs reviewed  Constitutional:       General: She is not in acute distress  Appearance: She is not ill-appearing, toxic-appearing or diaphoretic  HENT:      Head: Normocephalic and atraumatic  Eyes:      General: No scleral icterus  Extraocular Movements: Extraocular movements intact     Neck: Musculoskeletal: Normal range of motion and neck supple  Cardiovascular:      Rate and Rhythm: Normal rate  Pulmonary:      Effort: Pulmonary effort is normal  No respiratory distress  Abdominal:      General: There is no distension  Musculoskeletal: Normal range of motion  Neurological:      General: No focal deficit present  Mental Status: She is alert and oriented to person, place, and time  Gait: Gait normal           Imaging  Relevant imaging reviewed in chart    Labs  Relevant labs reviewed in chart   ASSESSMENT & PLAN      Diagnosis ICD-10-CM Associated Orders   1  Cancer of hepatic flexure (HCC)  C18 3 CT chest abdomen pelvis w contrast     CEA   2  S/P right hemicolectomy  Z90 49 CT chest abdomen pelvis w contrast     CEA         49-year-old female diagnosed with Stage IIA hepatic flexure adenocarcinoma in Fall 2019  She is status-post right hemicolectomy  She is under surveillance  See below for details   Oncology history updated accordingly this visit   Goals of care/patient communication  o I discussed with her the clinical course leading up to their cancer diagnosis  I reviewed relevant office notes, imaging reports and pathology result as well   o I told her that I am concerned about the symptoms that she has expressed to me with the pelvic pain and the bloody stool  Because she is hemodynamically stable right now, I recommend she gets her blood work done immediately after she finishes this office visit with a CBC  o I told her that if the findings are concerning I will contact her and tell her on what to do next  In the meantime she needs to get in touch with gastroenterology for an office visit with them for consideration of possible colonoscopy endoscopy procedure     TNM/Staging At Diagnosis  o [id number]  o Stage 2A   Disease Features/Tumor Markers/Genetics  o Notable Path Features: MSI-Stable   Treatment  o SP hemicolectomy   Labs  o CEA - q6m starting now  o CBC and CMP now   Diagnostics  o CT TAP - q6m starting now      Follow Up     June 9th      All questions were answered to the patient's satisfaction during this encounter  They appreciated and thanked me for spending time with them  The patient knows the contact information for our office and know to reach out for any relevant concerns related to this encounter  For all other listed problems and medical diagnosis in his chart - they are managed by PCP and/or other specialists, which patient acknowledges  Mary Herrera MD  Hematology & Medical Oncology

## 2021-04-13 ENCOUNTER — TELEPHONE (OUTPATIENT)
Dept: GASTROENTEROLOGY | Facility: CLINIC | Age: 36
End: 2021-04-13

## 2021-04-13 NOTE — TELEPHONE ENCOUNTER
Jaguar/Ragini - patient called to make OV - called patient back patient's insurance from [facility] expires 04/14/21 patient was told she needs to get a new insurance referral

## 2021-04-14 ENCOUNTER — OFFICE VISIT (OUTPATIENT)
Dept: HEMATOLOGY ONCOLOGY | Facility: CLINIC | Age: 36
End: 2021-04-14
Payer: COMMERCIAL

## 2021-04-14 ENCOUNTER — APPOINTMENT (OUTPATIENT)
Dept: LAB | Facility: HOSPITAL | Age: 36
End: 2021-04-14
Payer: COMMERCIAL

## 2021-04-14 ENCOUNTER — TELEPHONE (OUTPATIENT)
Dept: HEMATOLOGY ONCOLOGY | Facility: CLINIC | Age: 36
End: 2021-04-14

## 2021-04-14 VITALS
DIASTOLIC BLOOD PRESSURE: 84 MMHG | SYSTOLIC BLOOD PRESSURE: 128 MMHG | WEIGHT: 160 LBS | RESPIRATION RATE: 18 BRPM | HEIGHT: 62 IN | OXYGEN SATURATION: 97 % | HEART RATE: 104 BPM | BODY MASS INDEX: 29.44 KG/M2 | TEMPERATURE: 99.1 F

## 2021-04-14 DIAGNOSIS — C18.3 CANCER OF HEPATIC FLEXURE (HCC): Primary | ICD-10-CM

## 2021-04-14 DIAGNOSIS — C18.3 CANCER OF HEPATIC FLEXURE (HCC): ICD-10-CM

## 2021-04-14 DIAGNOSIS — Z78.9 NEED FOR FOLLOW-UP BY SOCIAL WORKER: ICD-10-CM

## 2021-04-14 DIAGNOSIS — Z90.49 S/P RIGHT HEMICOLECTOMY: ICD-10-CM

## 2021-04-14 LAB
ALBUMIN SERPL BCP-MCNC: 3.7 G/DL (ref 3.5–5)
ALP SERPL-CCNC: 77 U/L (ref 46–116)
ALT SERPL W P-5'-P-CCNC: 19 U/L (ref 12–78)
ANION GAP SERPL CALCULATED.3IONS-SCNC: 13 MMOL/L (ref 4–13)
AST SERPL W P-5'-P-CCNC: 10 U/L (ref 5–45)
BILIRUB SERPL-MCNC: 0.19 MG/DL (ref 0.2–1)
BUN SERPL-MCNC: 6 MG/DL (ref 5–25)
CALCIUM SERPL-MCNC: 8.9 MG/DL (ref 8.3–10.1)
CEA SERPL-MCNC: 0.6 NG/ML (ref 0–3)
CHLORIDE SERPL-SCNC: 106 MMOL/L (ref 100–108)
CO2 SERPL-SCNC: 21 MMOL/L (ref 21–32)
CREAT SERPL-MCNC: 0.74 MG/DL (ref 0.6–1.3)
ERYTHROCYTE [DISTWIDTH] IN BLOOD BY AUTOMATED COUNT: 12.9 % (ref 11.6–15.1)
GFR SERPL CREATININE-BSD FRML MDRD: 105 ML/MIN/1.73SQ M
GLUCOSE P FAST SERPL-MCNC: 104 MG/DL (ref 65–99)
HCT VFR BLD AUTO: 37.6 % (ref 34.8–46.1)
HGB BLD-MCNC: 12.5 G/DL (ref 11.5–15.4)
MCH RBC QN AUTO: 28.9 PG (ref 26.8–34.3)
MCHC RBC AUTO-ENTMCNC: 33.2 G/DL (ref 31.4–37.4)
MCV RBC AUTO: 87 FL (ref 82–98)
PLATELET # BLD AUTO: 326 THOUSANDS/UL (ref 149–390)
PMV BLD AUTO: 11.4 FL (ref 8.9–12.7)
POTASSIUM SERPL-SCNC: 3.9 MMOL/L (ref 3.5–5.3)
PROT SERPL-MCNC: 8 G/DL (ref 6.4–8.2)
RBC # BLD AUTO: 4.32 MILLION/UL (ref 3.81–5.12)
SODIUM SERPL-SCNC: 140 MMOL/L (ref 136–145)
WBC # BLD AUTO: 12.17 THOUSAND/UL (ref 4.31–10.16)

## 2021-04-14 PROCEDURE — 80053 COMPREHEN METABOLIC PANEL: CPT

## 2021-04-14 PROCEDURE — 36415 COLL VENOUS BLD VENIPUNCTURE: CPT

## 2021-04-14 PROCEDURE — 82378 CARCINOEMBRYONIC ANTIGEN: CPT

## 2021-04-14 PROCEDURE — 99214 OFFICE O/P EST MOD 30 MIN: CPT | Performed by: INTERNAL MEDICINE

## 2021-04-14 PROCEDURE — 85027 COMPLETE CBC AUTOMATED: CPT

## 2021-04-14 NOTE — TELEPHONE ENCOUNTER
Per Dr Alyse Laird request, spoke to [facility] and informed her that her hbg is WNL  Dr Lesia Clark would like her to f/u with her PCP and GI regarding the blood stools  Patient verbalized understanding

## 2021-04-15 ENCOUNTER — PATIENT OUTREACH (OUTPATIENT)
Dept: CASE MANAGEMENT | Facility: HOSPITAL | Age: 36
End: 2021-04-15

## 2021-04-15 NOTE — PROGRESS NOTES
Pt returned my call today and we reviewed her DT, completed at [facility] office, which she self scored as a 5 and indicated concerns including depression, fear, nervousness, sadness, and worry  Pt tells me today that these were all related to her appointment, as she has been having GI symptoms and felt apprehensive about what she might find out at the appointment  She feels better today and says that the appointment went well  She has been able to schedule with GI for later this month and is hopeful that they can give her some answers and solutions for her symptoms  She was appreciative of the follow up today but denies any needs or concerns  I explained that she can reach out to me directly or to the office if her needs were to change, and she was agreeable  MSW will remain available to pt as needed moving forward, no concerns at this time

## 2021-04-20 ENCOUNTER — HOSPITAL ENCOUNTER (OUTPATIENT)
Dept: RADIOLOGY | Facility: MEDICAL CENTER | Age: 36
Discharge: HOME | End: 2021-04-20
Payer: COMMERCIAL

## 2021-04-20 DIAGNOSIS — C18.3 CANCER OF HEPATIC FLEXURE (HCC): ICD-10-CM

## 2021-04-20 DIAGNOSIS — Z90.49 S/P RIGHT HEMICOLECTOMY: ICD-10-CM

## 2021-04-20 PROCEDURE — G1004 CDSM NDSC: HCPCS

## 2021-04-20 PROCEDURE — 71260 CT THORAX DX C+: CPT

## 2021-04-20 PROCEDURE — 74177 CT ABD & PELVIS W/CONTRAST: CPT

## 2021-04-20 RX ADMIN — IOHEXOL 100 ML: 350 INJECTION, SOLUTION INTRAVENOUS at 11:07

## 2021-04-29 ENCOUNTER — PREP FOR PROCEDURE (OUTPATIENT)
Dept: GASTROENTEROLOGY | Facility: CLINIC | Age: 36
End: 2021-04-29

## 2021-04-29 ENCOUNTER — OFFICE VISIT (OUTPATIENT)
Dept: GASTROENTEROLOGY | Facility: CLINIC | Age: 36
End: 2021-04-29
Payer: COMMERCIAL

## 2021-04-29 VITALS
SYSTOLIC BLOOD PRESSURE: 102 MMHG | WEIGHT: 156.8 LBS | HEIGHT: 62 IN | DIASTOLIC BLOOD PRESSURE: 80 MMHG | HEART RATE: 99 BPM | BODY MASS INDEX: 28.85 KG/M2

## 2021-04-29 DIAGNOSIS — R10.84 GENERALIZED ABDOMINAL PAIN: ICD-10-CM

## 2021-04-29 DIAGNOSIS — R11.0 NAUSEA: Primary | ICD-10-CM

## 2021-04-29 DIAGNOSIS — C18.3 MALIGNANT NEOPLASM OF HEPATIC FLEXURE (HCC): Primary | ICD-10-CM

## 2021-04-29 DIAGNOSIS — R19.7 DIARRHEA, UNSPECIFIED TYPE: ICD-10-CM

## 2021-04-29 DIAGNOSIS — Z86.010 PERSONAL HISTORY OF COLONIC POLYPS: ICD-10-CM

## 2021-04-29 DIAGNOSIS — R11.0 NAUSEA: ICD-10-CM

## 2021-04-29 PROCEDURE — 99213 OFFICE O/P EST LOW 20 MIN: CPT | Performed by: PHYSICIAN ASSISTANT

## 2021-04-29 NOTE — PROGRESS NOTES
[facility] - Outpatient Follow-up Note  Nikolas Reyes 39 y o  female MRN: [id number]  Encounter: [id number]          ASSESSMENT AND PLAN:      1  Malignant neoplasm of hepatic flexure (HCC)  2  Generalized abdominal pain  3  Diarrhea, unspecified type  4  Nausea  She reports ongoing abdominal pain, bloating, loose/stringy stools and nausea  She is 2 years s/p resection for malignant tumor at the hepatic flexure  Colonoscopy last year was normal  CT AP with contrast was normal  Labs are normal  Will repeat EGD and Colonoscopy  Start fiber and probiotic  She prefers to try natural treatments before medications - advised martha, aloe vera, papaya, licorice root, peppermint oil, IBguard  Discussed possible GFD or Low FODMAP    We briefly discussed how daily marijuana use can negatively impact GI issues    ______________________________________________________________________    SUBJECTIVE:    68-year-old female with a history of colon cancer presents for evaluation of abdominal pain, nausea, bloating and abnormal bowel movements  She reports that all of these symptoms been ongoing for the past several years  She has been diagnosed with irritable bowel syndrome but struggles with this  She reports that frequently her stools are slightly bloody  She feels that her anal opening is very tight and it is hard to pass gas or stool  Her stools are soft and stringy  She reports that she has about 4-5 bowel movements a day  She states that her abdominal pain and bloating is constant and frequently associated with nausea  She was diagnosed with colon cancer in 2019  This was resected by Dr Wellington Conley in November of 2019  She had a follow-up colonoscopy in February of 2020 which was reported as normal   She was trialed on several medications for her ongoing abdominal pain and diarrhea complaints  She took Questran, Colestid, Bentyl, Lotronex, fiber and probiotic all with minimal relief    She has cut dairy out of her diet and notes that it has helped but has not resolved the issues  She became concerned recently as she did notice increasing blood in the stool  She saw her oncologist   She had a CT abdomen pelvis with contrast which was reported as normal   Labs were reported as normal   She is not losing weight unexpectedly  She denies any alcohol, excessive NSAID use  She admits that she smokes marijuana daily  This is for anxiety  She occasionally feels that it helps her GI complaints  REVIEW OF SYSTEMS IS OTHERWISE NEGATIVE        Historical Information   Past Medical History:   Diagnosis Date    Anxiety     Asthma     Breast cyst, left     Cancer ([address] ) 2019    colon    Depression     Disease of thyroid gland     Pt hasn't seen physician regarding this since 2009    Irritable bowel syndrome     Uterine fibroid      Past Surgical History:   Procedure Laterality Date    COLONOSCOPY      CYSTOSCOPY N/A 10/21/2020    Procedure: CYSTOSCOPY;  Surgeon: Adryan Aguiar MD;  [facility];  Service: Gynecology    HEMORRHOID SURGERY      IR PICC PLACEMENT SINGLE LUMEN  10/30/2020    VT LAP,SURG,COLECTOMY, PARTIAL, W/ANAST N/A 11/12/2019    Procedure: ROBOTIC ASSISTED ILEOCOLECTOMY;  Surgeon: Rogerio Lira MD;  [facility];  Service: Robotics    VT LAPAROSCOPY W TOT HYSTERECTUTERUS <=250 GRAM  W TUBE/OVARY Bilateral 10/21/2020    Procedure: ROBOTIC ASSISTED LAPAROSCOPIC HYSTERECTOMY BILATERAL SALPINGECTOMY;  Surgeon: Adryan Aguiar MD;  [facility];  Service: Gynecology     Social History   Social History     Substance and Sexual Activity   Alcohol Use Yes    Alcohol/week: 1 0 standard drinks    Types: 1 Glasses of wine per week    Frequency: Monthly or less     Social History     Substance and Sexual Activity   Drug Use Yes    Types: Marijuana    Comment: medical marijuana     Social History     Tobacco Use   Smoking Status Never Smoker   Smokeless Tobacco Never Used     Family History   Problem Relation Age of Onset    Thyroid cancer Mother     Diabetes Father     Heart disease Father     Colon polyps Maternal Uncle     Breast cancer Maternal Grandmother 52    Prostate cancer Paternal Grandfather     Pancreatic cancer Paternal Grandfather     Colon polyps Paternal Aunt     No Known Problems Sister     No Known Problems Brother     No Known Problems Daughter     No Known Problems Brother     No Known Problems Daughter     Colon cancer Neg Hx     Ovarian cancer Neg Hx     Uterine cancer Neg Hx     Cervical cancer Neg Hx        Meds/Allergies       Current Outpatient Medications:     acetaminophen (TYLENOL) 325 mg tablet    albuterol (2 5 mg/3 mL) 0 083 % nebulizer solution    Multiple Vitamins-Minerals (HAIR SKIN AND NAILS FORMULA PO)    other medication, see sig,    Allergies   Allergen Reactions    Nuts - Food Allergy      GI distress    Peanut-Containing Drug Products - Food Allergy      GI distress           Objective     Blood pressure 102/80, pulse 99, height 5' 2" (1 575 m), weight 71 1 kg (156 lb 12 8 oz), last menstrual period 09/04/2020, not currently breastfeeding  Body mass index is 28 68 kg/m²  PHYSICAL EXAM:      General Appearance:   Alert, cooperative, no distress   HEENT:   Normocephalic, atraumatic, anicteric      Neck:  Supple, symmetrical, trachea midline   Lungs:   Clear to auscultation bilaterally; no rales, rhonchi or wheezing; respirations unlabored    Heart[de-identified]   Regular rate and rhythm; no murmur, rub, or gallop  Abdomen:   Soft, non-tender, non-distended; normal bowel sounds; no masses, no organomegaly    Genitalia:   Deferred    Rectal:   Deferred    Extremities:  No cyanosis, clubbing or edema    Pulses:  2+ and symmetric    Skin:  No jaundice, rashes, or lesions    Lymph nodes:  No palpable cervical lymphadenopathy        Lab Results:   No visits with results within 1 Day(s) from this visit     Latest known visit with results is:   Appointment on 04/14/2021   Component Date Value    CEA 04/14/2021 0 6     WBC 04/14/2021 12 17*    RBC 04/14/2021 4 32     Hemoglobin 04/14/2021 12 5     Hematocrit 04/14/2021 37 6     MCV 04/14/2021 87     MCH 04/14/2021 28 9     MCHC 04/14/2021 33 2     RDW 04/14/2021 12 9     Platelets 61/07/7069 326     MPV 04/14/2021 11 4     Sodium 04/14/2021 140     Potassium 04/14/2021 3 9     Chloride 04/14/2021 106     CO2 04/14/2021 21     ANION GAP 04/14/2021 13     BUN 04/14/2021 6     Creatinine 04/14/2021 0 74     Glucose, Fasting 04/14/2021 104*    Calcium 04/14/2021 8 9     AST 04/14/2021 10     ALT 04/14/2021 19     Alkaline Phosphatase 04/14/2021 77     Total Protein 04/14/2021 8 0     Albumin 04/14/2021 3 7     Total Bilirubin 04/14/2021 0 19*    eGFR 04/14/2021 105          Radiology Results:   Ct Chest Abdomen Pelvis W Contrast    Result Date: 4/25/2021  Narrative: CT CHEST, ABDOMEN AND PELVIS WITH IV CONTRAST INDICATION:   C18 3: Malignant neoplasm of hepatic flexure Z90 49: Acquired absence of other specified parts of digestive tract  History of stage IIa hepatic flexure adenocarcinoma in fall 2019, status post right hemicolectomy  Surveillance  COMPARISON:  CT, dated 6/12/2020  CT, dated 10/22/2019  TECHNIQUE: CT examination of the chest, abdomen and pelvis was performed  Axial, sagittal, and coronal 2D reformatted images were created from the source data and submitted for interpretation  Radiation dose length product (DLP) for this visit:  437 mGy-cm   This examination, like all CT scans performed in the [facility], was performed utilizing techniques to minimize radiation dose exposure, including the use of iterative reconstruction and automated exposure control  IV Contrast:  100 mL of iohexol (OMNIPAQUE) Enteric Contrast: Enteric contrast was administered  FINDINGS: CHEST LUNGS:  Lungs are clear    There is no tracheal or endobronchial lesion  PLEURA:  Unremarkable  HEART/GREAT VESSELS:  Unremarkable for patient's age  MEDIASTINUM AND GAYATRI:  Unremarkable  CHEST WALL AND LOWER NECK:   Unremarkable  ABDOMEN LIVER/BILIARY TREE:  A focal area of arterial enhancement immediately lateral to the falciform ligament was present on the study in 2019, and is favored to represent an area of benign perfusional change (series 2, image 60)  No additional concerning lesions are present  The liver is not cirrhotic in morphology  GALLBLADDER:  No calcified gallstones  No pericholecystic inflammatory change  SPLEEN:  Unremarkable  PANCREAS:  Unremarkable  ADRENAL GLANDS:  Unremarkable  KIDNEYS/URETERS:  Unremarkable  No hydronephrosis  STOMACH AND BOWEL:  Postsurgical changes related to right hemicolectomy are present  The surgical anastomosis is normal   Remainder of small and large bowel is normal in appearance  APPENDIX:  No findings to suggest appendicitis  ABDOMINOPELVIC CAVITY:  No ascites  No pneumoperitoneum  No lymphadenopathy  VESSELS:  Unremarkable for patient's age  PELVIS REPRODUCTIVE ORGANS:  Surgical changes of prior hysterectomy  URINARY BLADDER:  Unremarkable  ABDOMINAL WALL/INGUINAL REGIONS:  Unremarkable  OSSEOUS STRUCTURES:  No acute fracture or destructive osseous lesion  Impression: CHEST: No evidence of metastatic disease  ABDOMEN AND PELVIS: Stable postsurgical changes related to right hemicolectomy  No evidence of metastatic disease   Workstation performed: [id number]

## 2021-05-20 ENCOUNTER — ANESTHESIA EVENT (OUTPATIENT)
Dept: GASTROENTEROLOGY | Facility: HOSPITAL | Age: 36
End: 2021-05-20

## 2021-05-20 ENCOUNTER — ANESTHESIA (OUTPATIENT)
Dept: GASTROENTEROLOGY | Facility: HOSPITAL | Age: 36
End: 2021-05-20

## 2021-05-20 ENCOUNTER — HOSPITAL ENCOUNTER (OUTPATIENT)
Dept: GASTROENTEROLOGY | Facility: HOSPITAL | Age: 36
Setting detail: OUTPATIENT SURGERY
Discharge: HOME | End: 2021-05-20
Attending: INTERNAL MEDICINE | Admitting: INTERNAL MEDICINE
Payer: COMMERCIAL

## 2021-05-20 VITALS
BODY MASS INDEX: 27.75 KG/M2 | DIASTOLIC BLOOD PRESSURE: 65 MMHG | OXYGEN SATURATION: 100 % | WEIGHT: 150.79 LBS | HEART RATE: 80 BPM | RESPIRATION RATE: 19 BRPM | SYSTOLIC BLOOD PRESSURE: 108 MMHG | TEMPERATURE: 97 F | HEIGHT: 62 IN

## 2021-05-20 DIAGNOSIS — Z86.010 PERSONAL HISTORY OF COLONIC POLYPS: ICD-10-CM

## 2021-05-20 DIAGNOSIS — R11.0 NAUSEA: ICD-10-CM

## 2021-05-20 PROCEDURE — 88305 TISSUE EXAM BY PATHOLOGIST: CPT | Performed by: PATHOLOGY

## 2021-05-20 PROCEDURE — 45380 COLONOSCOPY AND BIOPSY: CPT | Performed by: INTERNAL MEDICINE

## 2021-05-20 PROCEDURE — 43239 EGD BIOPSY SINGLE/MULTIPLE: CPT | Performed by: INTERNAL MEDICINE

## 2021-05-20 PROCEDURE — 45385 COLONOSCOPY W/LESION REMOVAL: CPT | Performed by: INTERNAL MEDICINE

## 2021-05-20 RX ORDER — SODIUM CHLORIDE, SODIUM LACTATE, POTASSIUM CHLORIDE, CALCIUM CHLORIDE 600; 310; 30; 20 MG/100ML; MG/100ML; MG/100ML; MG/100ML
125 INJECTION, SOLUTION INTRAVENOUS CONTINUOUS
Status: DISCONTINUED | OUTPATIENT
Start: 2021-05-20 | End: 2021-05-24 | Stop reason: HOSPADM

## 2021-05-20 RX ORDER — ONDANSETRON 2 MG/ML
4 INJECTION INTRAMUSCULAR; INTRAVENOUS ONCE
Status: COMPLETED | OUTPATIENT
Start: 2021-05-20 | End: 2021-05-20

## 2021-05-20 RX ORDER — PROPOFOL 10 MG/ML
INJECTION, EMULSION INTRAVENOUS AS NEEDED
Status: DISCONTINUED | OUTPATIENT
Start: 2021-05-20 | End: 2021-05-20

## 2021-05-20 RX ORDER — ALBUTEROL SULFATE 2.5 MG/3ML
2.5 SOLUTION RESPIRATORY (INHALATION) ONCE
Status: COMPLETED | OUTPATIENT
Start: 2021-05-20 | End: 2021-05-20

## 2021-05-20 RX ORDER — SODIUM CHLORIDE, SODIUM LACTATE, POTASSIUM CHLORIDE, CALCIUM CHLORIDE 600; 310; 30; 20 MG/100ML; MG/100ML; MG/100ML; MG/100ML
125 INJECTION, SOLUTION INTRAVENOUS CONTINUOUS
Status: DISCONTINUED | OUTPATIENT
Start: 2021-05-20 | End: 2021-05-20

## 2021-05-20 RX ORDER — LIDOCAINE HYDROCHLORIDE 10 MG/ML
INJECTION, SOLUTION EPIDURAL; INFILTRATION; INTRACAUDAL; PERINEURAL AS NEEDED
Status: DISCONTINUED | OUTPATIENT
Start: 2021-05-20 | End: 2021-05-20

## 2021-05-20 RX ADMIN — PROPOFOL 30 MG: 10 INJECTION, EMULSION INTRAVENOUS at 10:49

## 2021-05-20 RX ADMIN — LIDOCAINE HYDROCHLORIDE 50 MG: 10 INJECTION, SOLUTION EPIDURAL; INFILTRATION; INTRACAUDAL; PERINEURAL at 10:45

## 2021-05-20 RX ADMIN — PROPOFOL 20 MG: 10 INJECTION, EMULSION INTRAVENOUS at 10:52

## 2021-05-20 RX ADMIN — ALBUTEROL SULFATE 2.5 MG: 2.5 SOLUTION RESPIRATORY (INHALATION) at 11:32

## 2021-05-20 RX ADMIN — SODIUM CHLORIDE, SODIUM LACTATE, POTASSIUM CHLORIDE, AND CALCIUM CHLORIDE 125 ML/HR: .6; .31; .03; .02 INJECTION, SOLUTION INTRAVENOUS at 09:29

## 2021-05-20 RX ADMIN — PROPOFOL 40 MG: 10 INJECTION, EMULSION INTRAVENOUS at 11:01

## 2021-05-20 RX ADMIN — PROPOFOL 30 MG: 10 INJECTION, EMULSION INTRAVENOUS at 10:48

## 2021-05-20 RX ADMIN — PROPOFOL 30 MG: 10 INJECTION, EMULSION INTRAVENOUS at 10:47

## 2021-05-20 RX ADMIN — PROPOFOL 30 MG: 10 INJECTION, EMULSION INTRAVENOUS at 10:53

## 2021-05-20 RX ADMIN — PROPOFOL 40 MG: 10 INJECTION, EMULSION INTRAVENOUS at 10:58

## 2021-05-20 RX ADMIN — PROPOFOL 30 MG: 10 INJECTION, EMULSION INTRAVENOUS at 10:51

## 2021-05-20 RX ADMIN — PROPOFOL 50 MG: 10 INJECTION, EMULSION INTRAVENOUS at 10:46

## 2021-05-20 RX ADMIN — PROPOFOL 150 MG: 10 INJECTION, EMULSION INTRAVENOUS at 10:45

## 2021-05-20 RX ADMIN — ONDANSETRON 4 MG: 2 INJECTION INTRAMUSCULAR; INTRAVENOUS at 09:38

## 2021-05-20 RX ADMIN — PROPOFOL 30 MG: 10 INJECTION, EMULSION INTRAVENOUS at 10:55

## 2021-05-20 NOTE — ANESTHESIA POSTPROCEDURE EVALUATION
Post-Op Assessment Note    CV Status:  Stable  Pain Score: 0    Pain management: adequate     Mental Status:  Sleepy   Hydration Status:  Stable   PONV Controlled:  Controlled   Airway Patency:  Patent and adequate   Two or more mitigation strategies used for obstructive sleep apnea   Post Op Vitals Reviewed: Yes      Staff: CRNA         No complications documented      BP (!) 160/108 (05/20/21 1110)    Temp (!) 97 °F (36 1 °C) (05/20/21 1110)    Pulse 103 (05/20/21 1110)   Resp 17 (05/20/21 1110)    SpO2 96 % (05/20/21 1110)

## 2021-05-20 NOTE — ANESTHESIA PREPROCEDURE EVALUATION
Procedure:  COLONOSCOPY  EGD    Relevant Problems   ANESTHESIA   (+) PONV (postoperative nausea and vomiting)      GI/HEPATIC   (+) Cancer of hepatic flexure (HCC)   (+) Small bowel obstruction (HCC)      GYN   (+) S/P laparoscopic hysterectomy      NEURO/PSYCH   (+) Anxiety   (+) Depression   (+) Personal history of colon cancer      PULMONARY   (+) Asthma        Physical Exam    Airway    Mallampati score: II  TM Distance: >3 FB  Neck ROM: full     Dental   No notable dental hx     Cardiovascular  Rhythm: regular, Rate: normal, Cardiovascular exam normal    Pulmonary  Pulmonary exam normal Breath sounds clear to auscultation,     Other Findings        Anesthesia Plan  ASA Score- 3     Anesthesia Type- IV sedation with anesthesia with ASA Monitors  Additional Monitors:   Airway Plan:           Plan Factors-Exercise tolerance (METS): >4 METS  Chart reviewed  Patient is a current smoker  Patient instructed to abstain from smoking on day of procedure  Patient did not smoke on day of surgery  There is medical exclusion for perioperative obstructive sleep apnea risk education  Induction- intravenous  Postoperative Plan-     Informed Consent- Anesthetic plan and risks discussed with patient  I personally reviewed this patient with the CRNA  Discussed and agreed on the Anesthesia Plan with the CRNA  Sheila Grant

## 2021-05-27 ENCOUNTER — OFFICE VISIT (OUTPATIENT)
Dept: GASTROENTEROLOGY | Facility: CLINIC | Age: 36
End: 2021-05-27
Payer: COMMERCIAL

## 2021-05-27 VITALS
BODY MASS INDEX: 28.49 KG/M2 | SYSTOLIC BLOOD PRESSURE: 122 MMHG | WEIGHT: 154.8 LBS | HEART RATE: 101 BPM | HEIGHT: 62 IN | DIASTOLIC BLOOD PRESSURE: 86 MMHG

## 2021-05-27 DIAGNOSIS — K58.0 IRRITABLE BOWEL SYNDROME WITH DIARRHEA: Primary | ICD-10-CM

## 2021-05-27 DIAGNOSIS — R11.0 NAUSEA: ICD-10-CM

## 2021-05-27 PROCEDURE — 99213 OFFICE O/P EST LOW 20 MIN: CPT | Performed by: PHYSICIAN ASSISTANT

## 2021-05-27 NOTE — PROGRESS NOTES
[facility] - Outpatient Follow-up Note  Mariajose Galarza 39 y o  female MRN: [id number]  Encounter: [id number]          ASSESSMENT AND PLAN:      1  Irritable bowel syndrome with diarrhea  She is on no treatment right now  Her colonoscopy showed a small benign polyp - she will be due routinely in 1 year due to her history of cancer  Advised fiber and probiotic  Trial of Xifaxan  She previously failed imodium, colestid, questran, dietary manipulation  Consider GFD or low FODMAP diet - information provided    She prefers not to be on a lot of medication but is receptive to starting a TCA as this would help depression/anxiety and her GI symptoms    2  Nausea  Worst in the morning  Again she prefers not to be on medication  CT was negative  EGD showed a 5cm paraesophageal hernia and 2 esophageal diverticulum  Trial of Fide   Again discussed how daily marijuana use can contribute to this  Would try to avoid any surgical repair of the hernia unless necessary    ______________________________________________________________________    SUBJECTIVE:  17-year-old female with a history of diarrhea predominant irritable bowel syndrome and colon cancer presents for follow-up after EGD and colonoscopy  Her colonoscopy was normal except for a small benign polyp which was removed  Her upper endoscopy showed a 5 cm type 2 hiatal hernia as well as 2 esophageal diverticulum  Patient continues to report nausea most notably in the morning  She states that she typically does not eat breakfast as when she eats she feels worse however she recently notes that by not eating she has symptoms as well  She continues to use marijuana on a daily basis  She gets this for medical reasons as it helps control her anxiety  She does understand how this could be contributing to her symptoms as a long-term but does not feel it would be beneficial for her to stop as it does help her anxiety so significantly    She has been on acid reducers in the past without relief  She has not tried any over-the-counter supplements  She prefers not to be on any prescription medications for this as she does not feel it is necessary  She continues with frequent loose stringy stools  She has abdominal pain and cramping  She reports bloating  She has occasional rectal bleeding  She has a known history of large external hemorrhoids  She underwent a hemorrhoidectomy 2 years ago without relief  Shortly after this she was diagnosed with colon cancer and is status post resection  She has been tried on Lotronex, Imodium, fiber, probiotic, Colestid, Questran and has started dietary manipulation all without relief  She does not feel the cramping is bad enough at this point that she needs any medication for this  Attempt was made to prescribe her Xifaxan in the past however her insurance did not pay for it  REVIEW OF SYSTEMS IS OTHERWISE NEGATIVE        Historical Information   Past Medical History:   Diagnosis Date    Anxiety     Asthma     Breast cyst, left     Cancer ([address] ) 2019    colon    Depression     Disease of thyroid gland     Pt hasn't seen physician regarding this since 2009    Irritable bowel syndrome     Uterine fibroid      Past Surgical History:   Procedure Laterality Date    COLONOSCOPY      CYSTOSCOPY N/A 10/21/2020    Procedure: CYSTOSCOPY;  Surgeon: Jared Vora MD;  [facility];  Service: Gynecology    HEMORRHOID SURGERY      IR PICC PLACEMENT SINGLE LUMEN  10/30/2020    [place] LAP,SURG,COLECTOMY, PARTIAL, W/ANAST N/A 11/12/2019    Procedure: ROBOTIC ASSISTED ILEOCOLECTOMY;  Surgeon: Brian Vargas MD;  [facility];  Service: Robotics    TX LAPAROSCOPY W TOT HYSTERECTUTERUS <=[address]  W TUBE/OVARY Bilateral 10/21/2020    Procedure: ROBOTIC ASSISTED LAPAROSCOPIC HYSTERECTOMY BILATERAL SALPINGECTOMY;  Surgeon: Jared Vora MD;  [facility];  Service: Gynecology     Social History   Social History     Substance and Sexual Activity   Alcohol Use Yes    Alcohol/week: 1 0 standard drinks    Types: 1 Glasses of wine per week    Frequency: Monthly or less     Social History     Substance and Sexual Activity   Drug Use Yes    Types: Marijuana    Comment: medical marijuana     Social History     Tobacco Use   Smoking Status Former Smoker    Quit date:     Years since quittin 4   Smokeless Tobacco Never Used     Family History   Problem Relation Age of Onset    Thyroid cancer Mother     Diabetes Father     Heart disease Father     Colon polyps Maternal Uncle     Breast cancer Maternal Grandmother 52    Prostate cancer Paternal Grandfather     Pancreatic cancer Paternal Grandfather     Colon polyps Paternal Aunt     No Known Problems Sister     No Known Problems Brother     No Known Problems Daughter     No Known Problems Brother     No Known Problems Daughter     Colon cancer Neg Hx     Ovarian cancer Neg Hx     Uterine cancer Neg Hx     Cervical cancer Neg Hx        Meds/Allergies       Current Outpatient Medications:     acetaminophen (TYLENOL) 325 mg tablet    albuterol (2 5 mg/3 mL) 0 083 % nebulizer solution    Multiple Vitamins-Minerals (HAIR SKIN AND NAILS FORMULA PO)    other medication, see sig,    rifaximin (XIFAXAN) 550 mg tablet    Allergies   Allergen Reactions    Nuts - Food Allergy      GI distress    Peanut-Containing Drug Products - Food Allergy      GI distress           Objective     Blood pressure 122/86, pulse 101, height 5' 2" (1 575 m), weight 70 2 kg (154 lb 12 8 oz), last menstrual period 2020, not currently breastfeeding  Body mass index is 28 31 kg/m²        PHYSICAL EXAM:      General Appearance:   Alert, cooperative, no distress   HEENT:   Normocephalic, atraumatic, anicteric      Neck:  Supple, symmetrical, trachea midline   Lungs:   Clear to auscultation bilaterally; no rales, rhonchi or wheezing; respirations unlabored    Heart[de-identified]   Regular rate and rhythm; no murmur, rub, or gallop  Abdomen:   Soft, non-tender, non-distended; normal bowel sounds; no masses, no organomegaly    Genitalia:   Deferred    Rectal:   Deferred    Extremities:  No cyanosis, clubbing or edema    Pulses:  2+ and symmetric    Skin:  No jaundice, rashes, or lesions    Lymph nodes:  No palpable cervical lymphadenopathy        Lab Results:   No visits with results within 1 Day(s) from this visit  Latest known visit with results is:   Hospital Outpatient Visit on 05/20/2021   Component Date Value    Case Report 05/20/2021                      Value:Surgical Pathology Report                         Case: [id number]                                   Authorizing Provider:  Radha Jorge DO          Collected:           05/20/2021 1047              Ordering Location:      Located within [facility]       Received:            05/20/2021 [address] [place] [address] Endoscopy                                                             Pathologist:           Xavier Heller MD                                                                Specimens:   A) - Duodenum, small intestine                                                                      B) - Stomach                                                                                        C) - Colon, transverse and sigmoid                                                                  D) - Polyp, Colorectal, rectum                                                             Final Diagnosis 05/20/2021                      Value: This result contains rich text formatting which cannot be displayed here   Additional Information 05/20/2021                      Value: This result contains rich text formatting which cannot be displayed here  [facility] Gross Description 05/20/2021                      Value: This result contains rich text formatting which cannot be displayed here           Radiology Results:   No results found

## 2021-06-09 ENCOUNTER — TELEPHONE (OUTPATIENT)
Dept: GASTROENTEROLOGY | Facility: CLINIC | Age: 36
End: 2021-06-09

## 2021-06-09 ENCOUNTER — OFFICE VISIT (OUTPATIENT)
Dept: HEMATOLOGY ONCOLOGY | Facility: CLINIC | Age: 36
End: 2021-06-09
Payer: COMMERCIAL

## 2021-06-09 VITALS
TEMPERATURE: 99.3 F | HEIGHT: 62 IN | WEIGHT: 154 LBS | BODY MASS INDEX: 28.34 KG/M2 | DIASTOLIC BLOOD PRESSURE: 76 MMHG | OXYGEN SATURATION: 98 % | HEART RATE: 101 BPM | SYSTOLIC BLOOD PRESSURE: 110 MMHG | RESPIRATION RATE: 18 BRPM

## 2021-06-09 DIAGNOSIS — Z90.49 S/P RIGHT HEMICOLECTOMY: ICD-10-CM

## 2021-06-09 DIAGNOSIS — Z85.038 ENCOUNTER FOR FOLLOW-UP SURVEILLANCE OF COLON CANCER: ICD-10-CM

## 2021-06-09 DIAGNOSIS — C18.3 CANCER OF HEPATIC FLEXURE (HCC): Primary | ICD-10-CM

## 2021-06-09 DIAGNOSIS — Z08 ENCOUNTER FOR FOLLOW-UP SURVEILLANCE OF COLON CANCER: ICD-10-CM

## 2021-06-09 PROCEDURE — 99214 OFFICE O/P EST MOD 30 MIN: CPT | Performed by: INTERNAL MEDICINE

## 2021-06-09 RX ORDER — AMITRIPTYLINE HYDROCHLORIDE 25 MG/1
150 TABLET, FILM COATED ORAL
COMMUNITY
Start: 2021-05-27 | End: 2022-05-27

## 2021-06-09 NOTE — TELEPHONE ENCOUNTER
Ragini patient - Needs medication that was ordered end of May and patient still does not have it  rifaximin (XIFAXAN) 550 mg tablet [[id number]] Patient is getting very upset and would really appreciate her meds  Please send to [facility] @ [phone]    Thx

## 2021-06-09 NOTE — PROGRESS NOTES
[facility] - Hematology & Medical Oncology  Outpatient Visit Encounter Note    Bismark Jhaveri 39 y o  female [id number] Date:  6/9/2021      SUBJECTIVE      Initial Visit With Me    Bismark Jhaveri is a 39 y o  transferring her care from Dr Luisana Luis  She was last seen by him on 10/13/2020 as an office visit  She was being managed for colon cancer  In review of the chart and talking with the patient, she was diagnosed with [id number] Stage 2A in Fall 2019  She underwent right hemicolectomy and has been on surveillance since  She had an unplanned CT abdomen pelvis on 06/12/2020 for diarrhea assessment that did not show any disease recurrence  Her last CEA was normal on 9/30/2020  She has history of hysterectomy for fibroids and had post-op SBO and resolved then  However, she tells me that she has issues with pelvic pain now and has noticed blood in the stool  She tells me that she has noticed vague symptoms with on and off dyspnea as well  She tells me she is trying to make an appointment gastroenterology to see them as recommended by her PCP  This Visit    She denies f/c/n/v  She has some chronic abdominal pain that she follows up with GI for she  Tells me  She has diarrhea managed by them  No bloody stools  I have reviewed the relevant past medical, surgical, social and family history  I have also reviewed allergies and medications for this patient  Review of Systems  Review of Systems   All other systems reviewed and are negative  OBJECTIVE     Physical Exam  Vitals:    06/09/21 1135   BP: 110/76   BP Location: Left arm   Patient Position: Sitting   Cuff Size: Adult   Pulse: 101   Resp: 18   Temp: 99 3 °F (37 4 °C)   SpO2: 98%   Weight: 69 9 kg (154 lb)   Height: 5' 2" (1 575 m)       Physical Exam  Vitals signs reviewed  Constitutional:       General: She is not in acute distress  Appearance: She is not toxic-appearing     HENT: Head: Normocephalic and atraumatic  Eyes:      General: No scleral icterus  Neck:      Musculoskeletal: Normal range of motion and neck supple  Cardiovascular:      Rate and Rhythm: Normal rate  Pulmonary:      Effort: Pulmonary effort is normal  No respiratory distress  Abdominal:      General: There is no distension  Musculoskeletal: Normal range of motion  Neurological:      General: No focal deficit present  Mental Status: She is alert and oriented to person, place, and time  Mental status is at baseline  Imaging  Relevant imaging reviewed in chart    Labs  Relevant labs reviewed in chart   ASSESSMENT & PLAN      Diagnosis ICD-10-CM Associated Orders   1  Cancer of hepatic flexure (HCC)  C18 3 CT chest abdomen pelvis w contrast     Comprehensive metabolic panel     CBC and differential     CEA   2  S/P right hemicolectomy  Z90 49 CT chest abdomen pelvis w contrast     Comprehensive metabolic panel     CBC and differential     CEA   3  Encounter for follow-up surveillance of colon cancer  Z08 CT chest abdomen pelvis w contrast    Z85 038 Comprehensive metabolic panel     CBC and differential     CEA         70-year-old female diagnosed with Stage IIA hepatic flexure adenocarcinoma in Fall 2019  She is status-post right hemicolectomy  She is under surveillance  See below for details   Oncology history updated accordingly this visit   Goals of care/patient communication  o Continue with surveillance as outlined below   TNM/Staging At Diagnosis  o T3N0M0  o Stage 2A   Disease Features/Tumor Markers/Genetics  o Notable Path Features: MSI-Stable  o Genetics 11/19/2019 - negative   Treatment  o SP hemicolectomy   Labs  o CEA, CBC, CMP - q6m    Diagnostics  o CT TAP - q6m      Follow Up   1 week after her scan is done      All questions were answered to the patient's satisfaction during this encounter  They appreciated and thanked me for spending time with them   The patient knows the contact information for our office and know to reach out for any relevant concerns related to this encounter  For all other listed problems and medical diagnosis in his chart - they are managed by PCP and/or other specialists, which patient acknowledges  Mary Hill MD  Hematology & Medical Oncology

## 2021-06-24 ENCOUNTER — OFFICE VISIT (OUTPATIENT)
Dept: GASTROENTEROLOGY | Facility: CLINIC | Age: 36
End: 2021-06-24
Payer: COMMERCIAL

## 2021-06-24 VITALS
HEART RATE: 68 BPM | HEIGHT: 62 IN | DIASTOLIC BLOOD PRESSURE: 72 MMHG | SYSTOLIC BLOOD PRESSURE: 116 MMHG | BODY MASS INDEX: 28.52 KG/M2 | WEIGHT: 155 LBS

## 2021-06-24 DIAGNOSIS — K58.0 IRRITABLE BOWEL SYNDROME WITH DIARRHEA: Primary | ICD-10-CM

## 2021-06-24 PROCEDURE — 99213 OFFICE O/P EST LOW 20 MIN: CPT | Performed by: PHYSICIAN ASSISTANT

## 2021-06-24 NOTE — PROGRESS NOTES
[facility] - Outpatient Follow-up Note  Mariajose Galarza 39 y o  female MRN: [id number]  Encounter: [id number]          ASSESSMENT AND PLAN:      1  Irritable bowel syndrome with diarrhea  The xifaxan has worsened her diarrhea  She will stop the course  She wants to pursue dietary changes - given information about low FODMAP diet  She is on Amitriptyline for 2 weeks  She does not want any other prescription medication at this time    Will f/u in 2 months  She will continue Elavil and Benefiber    ______________________________________________________________________    SUBJECTIVE:  14-year-old female with diarrhea predominant irritable bowel syndrome presents for follow-up  Unfortunately, after starting Xifaxan her diarrhea worsened  She only has 2 days left of the course but is going to stop early  She reports increasing cramping and bloating as well  Her typical bowel movement habits are 3-4 loose to watery stools daily with urgency  She reports that she is afraid to even pass gas without being on the toilet bowl as typically stool come out with  This was problematic prior to her colon cancer diagnosis but has become worse after  Her last colonoscopy was in May of this year  One polyp was removed and random biopsies were taken to rule out microscopic colitis  All of the biopsies were benign  She denies any recurrent rectal bleeding  She is not losing weight  She is becoming very frustrated with all the prescription medications that she is being given  She feels that they do not help or give her adverse side effects  She very much prefers to be off medications if possible  That being said she did start amitriptyline 2 weeks ago  She is not sure if it is helping it  She is very interested in pursuing dietary changes  She has started to restart her gluten free diet and information was provided regarding a low FODMAP diet        REVIEW OF SYSTEMS IS OTHERWISE NEGATIVE        Historical Information   Past Medical History:   Diagnosis Date    Anxiety     Asthma     Breast cyst, left     Cancer ([address] ) 2019    colon    Depression     Disease of thyroid gland     Pt hasn't seen physician regarding this since 2009    Irritable bowel syndrome     Uterine fibroid      Past Surgical History:   Procedure Laterality Date    COLONOSCOPY      CYSTOSCOPY N/A 10/21/2020    Procedure: CYSTOSCOPY;  Surgeon: Alicia Saravia MD;  [facility];  Service: Gynecology    HEMORRHOID SURGERY      IR PICC PLACEMENT SINGLE LUMEN  10/30/2020    [place] LAP,SURG,COLECTOMY, PARTIAL, W/ANAST N/A 2019    Procedure: ROBOTIC ASSISTED ILEOCOLECTOMY;  Surgeon: Baldo Tran MD;  [facility];  Service: Robotics    OK LAPAROSCOPY W TOT HYSTERECTUTERUS <=[address]  W TUBE/OVARY Bilateral 10/21/2020    Procedure: ROBOTIC ASSISTED LAPAROSCOPIC HYSTERECTOMY BILATERAL SALPINGECTOMY;  Surgeon: Alicia Saravia MD;  [facility];  Service: Gynecology     Social History   Social History     Substance and Sexual Activity   Alcohol Use Yes    Alcohol/week: 1 0 standard drinks    Types: 1 Glasses of wine per week     Social History     Substance and Sexual Activity   Drug Use Yes    Types: Marijuana    Comment: medical marijuana     Social History     Tobacco Use   Smoking Status Former Smoker    Quit date:     Years since quittin 4   Smokeless Tobacco Never Used     Family History   Problem Relation Age of Onset    Thyroid cancer Mother     Diabetes Father     Heart disease Father     Colon polyps Maternal Uncle     Breast cancer Maternal Grandmother 52    Prostate cancer Paternal Grandfather     Pancreatic cancer Paternal Grandfather     Colon polyps Paternal Aunt     No Known Problems Sister     No Known Problems Brother     No Known Problems Daughter     No Known Problems Brother     No Known Problems Daughter     Colon cancer Neg Hx     Ovarian cancer Neg Hx     Uterine cancer Neg Hx     Cervical cancer Neg Hx        Meds/Allergies       Current Outpatient Medications:     acetaminophen (TYLENOL) 325 mg tablet    albuterol (2 5 mg/3 mL) 0 083 % nebulizer solution    amitriptyline (ELAVIL) 25 mg tablet    Multiple Vitamins-Minerals (HAIR SKIN AND NAILS FORMULA PO)    other medication, see sig,    Allergies   Allergen Reactions    Nuts - Food Allergy      GI distress    Peanut-Containing Drug Products - Food Allergy      GI distress           Objective     Blood pressure 116/72, pulse 68, height 5' 2" (1 575 m), weight 70 3 kg (155 lb), last menstrual period 09/04/2020, not currently breastfeeding  Body mass index is 28 35 kg/m²  PHYSICAL EXAM:      General Appearance:   Alert, cooperative, no distress   HEENT:   Normocephalic, atraumatic, anicteric      Neck:  Supple, symmetrical, trachea midline   Lungs:   Clear to auscultation bilaterally; no rales, rhonchi or wheezing; respirations unlabored    Heart[de-identified]   Regular rate and rhythm; no murmur, rub, or gallop  Abdomen:   Soft, non-tender, non-distended; normal bowel sounds; no masses, no organomegaly    Genitalia:   Deferred    Rectal:   Deferred    Extremities:  No cyanosis, clubbing or edema    Pulses:  2+ and symmetric    Skin:  No jaundice, rashes, or lesions    Lymph nodes:  No palpable cervical lymphadenopathy        Lab Results:   No visits with results within 1 Day(s) from this visit     Latest known visit with results is:   Hospital Outpatient Visit on 05/20/2021   Component Date Value    Case Report 05/20/2021                      Value:Surgical Pathology Report                         Case: [id number]                                   Authorizing Provider:  Dary Dean DO          Collected:           05/20/2021 1047              Ordering Location:      [facility]       Received:            05/20/2021 [address] [place] [address] Endoscopy Pathologist:           Khushbu Lim MD                                                                Specimens:   A) - Duodenum, small intestine                                                                      B) - Stomach                                                                                        C) - Colon, transverse and sigmoid                                                                  D) - Polyp, Colorectal, rectum                                                             Final Diagnosis 05/20/2021                      Value: This result contains rich text formatting which cannot be displayed here   Additional Information 05/20/2021                      Value: This result contains rich text formatting which cannot be displayed here  [facility] Gross Description 05/20/2021                      Value: This result contains rich text formatting which cannot be displayed here  Radiology Results:   No results found

## 2021-08-20 RX ORDER — ALPRAZOLAM 0.5 MG/1
0.5 TABLET ORAL 3 TIMES DAILY PRN
COMMUNITY
Start: 2021-08-12 | End: 2022-02-08

## 2021-08-25 ENCOUNTER — OFFICE VISIT (OUTPATIENT)
Dept: GASTROENTEROLOGY | Facility: CLINIC | Age: 36
End: 2021-08-25
Payer: COMMERCIAL

## 2021-08-25 VITALS
SYSTOLIC BLOOD PRESSURE: 118 MMHG | HEART RATE: 86 BPM | DIASTOLIC BLOOD PRESSURE: 80 MMHG | WEIGHT: 145 LBS | HEIGHT: 62 IN | BODY MASS INDEX: 26.68 KG/M2

## 2021-08-25 DIAGNOSIS — K58.0 IRRITABLE BOWEL SYNDROME WITH DIARRHEA: Primary | ICD-10-CM

## 2021-08-25 PROCEDURE — 99213 OFFICE O/P EST LOW 20 MIN: CPT | Performed by: PHYSICIAN ASSISTANT

## 2021-08-25 NOTE — PROGRESS NOTES
[facility] - Outpatient Follow-up Note  Anika Arboleda 39 y o  female MRN: [id number]  Encounter: [id number]          ASSESSMENT AND PLAN:      1  Irritable bowel syndrome with diarrhea  She has been under a great deal of stress in the past 2 months which has flared her symptoms  She is trying to follow a low FODMAP diet  She is taking fiber  Her Amitriptyline dose has been increased to 100mg qhs  Will f/u in 6-8 weeks    ______________________________________________________________________    SUBJECTIVE:  Year old female with diarrhea predominant irritable bowel syndrome and history of colon cancer status post resection presents for follow-up  Unfortunately, after her last appointment she has experienced significant life stressors  Her Amitriptyline dose has been increased to 100 mg q h s  by her family doctor  She is unsure at this point if it is helping  She has tried and failed many medications for her IBS in the past including imodium, questran and xifaxan  She is maintaining on a fiber supplement as well as a low FODMAP diet  She denies any rectal bleeding or weight loss  Her last colonoscopy was in May and reported as normal     REVIEW OF SYSTEMS IS OTHERWISE NEGATIVE        Historical Information   Past Medical History:   Diagnosis Date    Anxiety     Asthma     Breast cyst, left     Cancer ([address] ) 2019    colon    Depression     Disease of thyroid gland     Pt hasn't seen physician regarding this since 2009    Irritable bowel syndrome     Uterine fibroid      Past Surgical History:   Procedure Laterality Date    COLONOSCOPY      CYSTOSCOPY N/A 10/21/2020    Procedure: CYSTOSCOPY;  Surgeon: Rosie Slater MD;  [facility];  Service: Gynecology    HEMORRHOID SURGERY      IR PICC PLACEMENT SINGLE LUMEN  10/30/2020    SC LAP,SURG,COLECTOMY, PARTIAL, W/ANAST N/A 11/12/2019    Procedure: ROBOTIC ASSISTED ILEOCOLECTOMY;  Surgeon: Samira Menendez MD; [facility];  Service: Robotics    [place] LAPAROSCOPY W TOT HYSTERECTUTERUS <=250 GRAM  W TUBE/OVARY Bilateral 10/21/2020    Procedure: ROBOTIC ASSISTED LAPAROSCOPIC HYSTERECTOMY BILATERAL SALPINGECTOMY;  Surgeon: Jordan Schulz MD;  [facility];  Service: Gynecology     Social History   Social History     Substance and Sexual Activity   Alcohol Use Yes    Alcohol/week: 1 0 standard drinks    Types: 1 Glasses of wine per week     Social History     Substance and Sexual Activity   Drug Use Yes    Types: Marijuana    Comment: medical marijuana     Social History     Tobacco Use   Smoking Status Former Smoker    Quit date:     Years since quittin 6   Smokeless Tobacco Never Used     Family History   Problem Relation Age of Onset    Thyroid cancer Mother     Diabetes Father     Heart disease Father     Colon polyps Maternal Uncle     Breast cancer Maternal Grandmother 52    Prostate cancer Paternal Grandfather     Pancreatic cancer Paternal Grandfather     Colon polyps Paternal Aunt     No Known Problems Sister     No Known Problems Brother     No Known Problems Daughter     No Known Problems Brother     No Known Problems Daughter     Colon cancer Neg Hx     Ovarian cancer Neg Hx     Uterine cancer Neg Hx     Cervical cancer Neg Hx        Meds/Allergies       Current Outpatient Medications:     acetaminophen (TYLENOL) 325 mg tablet    albuterol (2 5 mg/3 mL) 0 083 % nebulizer solution    ALPRAZolam (XANAX) 0 5 mg tablet    amitriptyline (ELAVIL) 25 mg tablet    Multiple Vitamins-Minerals (HAIR SKIN AND NAILS FORMULA PO)    other medication, see sig,    Allergies   Allergen Reactions    Nuts - Food Allergy      GI distress    Peanut-Containing Drug Products - Food Allergy      GI distress           Objective     Blood pressure 118/80, pulse 86, height 5' 2" (1 575 m), weight 65 8 kg (145 lb), last menstrual period 2020, not currently breastfeeding   Body mass index is 26 52 kg/m²  PHYSICAL EXAM:      General Appearance:   Alert, cooperative, no distress   HEENT:   Normocephalic, atraumatic, anicteric      Neck:  Supple, symmetrical, trachea midline   Lungs:   Clear to auscultation bilaterally; no rales, rhonchi or wheezing; respirations unlabored    Heart[de-identified]   Regular rate and rhythm; no murmur, rub, or gallop  Abdomen:   Soft, non-tender, non-distended; normal bowel sounds; no masses, no organomegaly    Genitalia:   Deferred    Rectal:   Deferred    Extremities:  No cyanosis, clubbing or edema    Pulses:  2+ and symmetric    Skin:  No jaundice, rashes, or lesions    Lymph nodes:  No palpable cervical lymphadenopathy        Lab Results:   No visits with results within 1 Day(s) from this visit  Latest known visit with results is:   Hospital Outpatient Visit on 05/20/2021   Component Date Value    Case Report 05/20/2021                      Value:Surgical Pathology Report                         Case: [id number]                                   Authorizing Provider:  Zulma Madrigal DO          Collected:           05/20/2021 1047              Ordering Location:      [facility]       Received:            05/20/2021 [address] [place] [address] Endoscopy                                                             Pathologist:           Michael Morgan MD                                                                Specimens:   A) - Duodenum, small intestine                                                                      B) - Stomach                                                                                        C) - Colon, transverse and sigmoid                                                                  D) - Polyp, Colorectal, rectum                                                             Final Diagnosis 05/20/2021                      Value: This result contains rich text formatting which cannot be displayed here      Additional Information 05/20/2021                      Value: This result contains rich text formatting which cannot be displayed here  Germania Silence Gross Description 05/20/2021                      Value: This result contains rich text formatting which cannot be displayed here  Radiology Results:   No results found

## 2021-09-24 ENCOUNTER — TELEPHONE (OUTPATIENT)
Dept: GASTROENTEROLOGY | Facility: CLINIC | Age: 36
End: 2021-09-24

## 2021-09-24 DIAGNOSIS — K21.9 GASTROESOPHAGEAL REFLUX DISEASE WITHOUT ESOPHAGITIS: Primary | ICD-10-CM

## 2021-09-24 NOTE — TELEPHONE ENCOUNTER
Jolynn Gowers- patient is experiencing vomiting episodes the last few mornings     She has scheduled an appt 10/18     Uses: [phone]  Please phone [phone] to advise  Chan Portillo

## 2021-09-28 NOTE — TELEPHONE ENCOUNTER
I think that she should start a short course of PPI therapy until her appt! Thank you! We can give her Pantoprazole 40mg daily

## 2021-09-28 NOTE — TELEPHONE ENCOUNTER
Ragini patient - Patient called and asked to speak with David Baez as the new meds are not helping with her vomiting and nausea  Patient is scheduled for OV on 10/18  Please RTRN call to [phone]    Thx

## 2021-09-28 NOTE — TELEPHONE ENCOUNTER
Spoke with patient she states the Zofran every 6 hours is not working for her nausea  And vomiting  Patient is taking Pepcid at bedtime  Just started medication on Saturday    Please advise   Thank you!

## 2021-09-29 DIAGNOSIS — K21.9 GASTROESOPHAGEAL REFLUX DISEASE, UNSPECIFIED WHETHER ESOPHAGITIS PRESENT: Primary | ICD-10-CM

## 2021-09-30 RX ORDER — PANTOPRAZOLE SODIUM 40 MG/1
40 TABLET, DELAYED RELEASE ORAL DAILY
Qty: 30 TABLET | Refills: 0 | Status: SHIPPED | OUTPATIENT
Start: 2021-09-30 | End: 2021-10-18 | Stop reason: SDUPTHER

## 2021-09-30 NOTE — TELEPHONE ENCOUNTER
Baldemar Caldwell - Patient called - Gregory gould on 09/28/21 originally  Patient stated that Shilpi Goss was going to be sending a prescription, that Baldemar Caldwell agreed to, to [facility]  Prescription was not received  Please send script again to [facility]  Thank you

## 2021-09-30 NOTE — TELEPHONE ENCOUNTER
In previous encounter, Anushka Yanes sated to have pt try pantoprazole 40 mg for 30 days  rx sent to provider

## 2021-10-02 RX ORDER — PANTOPRAZOLE SODIUM 40 MG/1
40 TABLET, DELAYED RELEASE ORAL DAILY
Qty: 30 TABLET | Refills: 0 | Status: SHIPPED | OUTPATIENT
Start: 2021-10-02

## 2021-10-12 ENCOUNTER — HOSPITAL ENCOUNTER (OUTPATIENT)
Dept: CT IMAGING | Facility: HOSPITAL | Age: 36
Discharge: HOME | End: 2021-10-12
Payer: COMMERCIAL

## 2021-10-12 ENCOUNTER — APPOINTMENT (OUTPATIENT)
Dept: LAB | Facility: HOSPITAL | Age: 36
End: 2021-10-12
Payer: COMMERCIAL

## 2021-10-12 DIAGNOSIS — Z90.49 S/P RIGHT HEMICOLECTOMY: ICD-10-CM

## 2021-10-12 DIAGNOSIS — Z08 ENCOUNTER FOR FOLLOW-UP SURVEILLANCE OF COLON CANCER: ICD-10-CM

## 2021-10-12 DIAGNOSIS — Z85.038 ENCOUNTER FOR FOLLOW-UP SURVEILLANCE OF COLON CANCER: ICD-10-CM

## 2021-10-12 DIAGNOSIS — C18.3 CANCER OF HEPATIC FLEXURE (HCC): ICD-10-CM

## 2021-10-12 LAB
ALBUMIN SERPL BCP-MCNC: 3.3 G/DL (ref 3.5–5)
ALP SERPL-CCNC: 71 U/L (ref 46–116)
ALT SERPL W P-5'-P-CCNC: 11 U/L (ref 12–78)
ANION GAP SERPL CALCULATED.3IONS-SCNC: 9 MMOL/L (ref 4–13)
AST SERPL W P-5'-P-CCNC: 8 U/L (ref 5–45)
BASOPHILS # BLD AUTO: 0.06 THOUSANDS/ΜL (ref 0–0.1)
BASOPHILS NFR BLD AUTO: 1 % (ref 0–1)
BILIRUB SERPL-MCNC: 0.21 MG/DL (ref 0.2–1)
BUN SERPL-MCNC: 7 MG/DL (ref 5–25)
CALCIUM ALBUM COR SERPL-MCNC: 9.3 MG/DL (ref 8.3–10.1)
CALCIUM SERPL-MCNC: 8.7 MG/DL (ref 8.3–10.1)
CEA SERPL-MCNC: 1.4 NG/ML (ref 0–3)
CHLORIDE SERPL-SCNC: 102 MMOL/L (ref 100–108)
CO2 SERPL-SCNC: 27 MMOL/L (ref 21–32)
CREAT SERPL-MCNC: 0.68 MG/DL (ref 0.6–1.3)
EOSINOPHIL # BLD AUTO: 0.35 THOUSAND/ΜL (ref 0–0.61)
EOSINOPHIL NFR BLD AUTO: 4 % (ref 0–6)
ERYTHROCYTE [DISTWIDTH] IN BLOOD BY AUTOMATED COUNT: 12.9 % (ref 11.6–15.1)
GFR SERPL CREATININE-BSD FRML MDRD: 113 ML/MIN/1.73SQ M
GLUCOSE P FAST SERPL-MCNC: 86 MG/DL (ref 65–99)
HCT VFR BLD AUTO: 35.4 % (ref 34.8–46.1)
HGB BLD-MCNC: 11.3 G/DL (ref 11.5–15.4)
IMM GRANULOCYTES # BLD AUTO: 0.05 THOUSAND/UL (ref 0–0.2)
IMM GRANULOCYTES NFR BLD AUTO: 1 % (ref 0–2)
LYMPHOCYTES # BLD AUTO: 1.81 THOUSANDS/ΜL (ref 0.6–4.47)
LYMPHOCYTES NFR BLD AUTO: 21 % (ref 14–44)
MCH RBC QN AUTO: 28.8 PG (ref 26.8–34.3)
MCHC RBC AUTO-ENTMCNC: 31.9 G/DL (ref 31.4–37.4)
MCV RBC AUTO: 90 FL (ref 82–98)
MONOCYTES # BLD AUTO: 0.44 THOUSAND/ΜL (ref 0.17–1.22)
MONOCYTES NFR BLD AUTO: 5 % (ref 4–12)
NEUTROPHILS # BLD AUTO: 6.03 THOUSANDS/ΜL (ref 1.85–7.62)
NEUTS SEG NFR BLD AUTO: 68 % (ref 43–75)
NRBC BLD AUTO-RTO: 0 /100 WBCS
PLATELET # BLD AUTO: 299 THOUSANDS/UL (ref 149–390)
PMV BLD AUTO: 11.3 FL (ref 8.9–12.7)
POTASSIUM SERPL-SCNC: 3.7 MMOL/L (ref 3.5–5.3)
PROT SERPL-MCNC: 6.9 G/DL (ref 6.4–8.2)
RBC # BLD AUTO: 3.93 MILLION/UL (ref 3.81–5.12)
SODIUM SERPL-SCNC: 138 MMOL/L (ref 136–145)
WBC # BLD AUTO: 8.74 THOUSAND/UL (ref 4.31–10.16)

## 2021-10-12 PROCEDURE — 71260 CT THORAX DX C+: CPT

## 2021-10-12 PROCEDURE — 80053 COMPREHEN METABOLIC PANEL: CPT

## 2021-10-12 PROCEDURE — 82378 CARCINOEMBRYONIC ANTIGEN: CPT

## 2021-10-12 PROCEDURE — 74177 CT ABD & PELVIS W/CONTRAST: CPT

## 2021-10-12 PROCEDURE — 85025 COMPLETE CBC W/AUTO DIFF WBC: CPT

## 2021-10-12 PROCEDURE — G1004 CDSM NDSC: HCPCS

## 2021-10-12 PROCEDURE — 36415 COLL VENOUS BLD VENIPUNCTURE: CPT

## 2021-10-12 RX ADMIN — IOHEXOL 100 ML: 350 INJECTION, SOLUTION INTRAVENOUS at 07:34

## 2021-10-18 ENCOUNTER — OFFICE VISIT (OUTPATIENT)
Dept: GASTROENTEROLOGY | Facility: CLINIC | Age: 36
End: 2021-10-18
Payer: COMMERCIAL

## 2021-10-18 VITALS
BODY MASS INDEX: 25.21 KG/M2 | HEIGHT: 62 IN | HEART RATE: 92 BPM | SYSTOLIC BLOOD PRESSURE: 116 MMHG | WEIGHT: 137 LBS | DIASTOLIC BLOOD PRESSURE: 62 MMHG

## 2021-10-18 DIAGNOSIS — R11.0 NAUSEA: ICD-10-CM

## 2021-10-18 DIAGNOSIS — R19.7 DIARRHEA, UNSPECIFIED TYPE: Primary | ICD-10-CM

## 2021-10-18 DIAGNOSIS — K58.0 IRRITABLE BOWEL SYNDROME WITH DIARRHEA: ICD-10-CM

## 2021-10-18 DIAGNOSIS — K21.9 GASTROESOPHAGEAL REFLUX DISEASE, UNSPECIFIED WHETHER ESOPHAGITIS PRESENT: ICD-10-CM

## 2021-10-18 PROCEDURE — 99213 OFFICE O/P EST LOW 20 MIN: CPT | Performed by: PHYSICIAN ASSISTANT

## 2021-10-18 RX ORDER — PANTOPRAZOLE SODIUM 40 MG/1
40 TABLET, DELAYED RELEASE ORAL DAILY
Qty: 30 TABLET | Refills: 3 | Status: SHIPPED | OUTPATIENT
Start: 2021-10-18 | End: 2022-03-24 | Stop reason: ALTCHOICE

## 2021-10-18 RX ORDER — METOCLOPRAMIDE 5 MG/1
5 TABLET ORAL 4 TIMES DAILY
Qty: 120 TABLET | Refills: 6 | Status: SHIPPED | OUTPATIENT
Start: 2021-10-18

## 2021-10-19 ENCOUNTER — OFFICE VISIT (OUTPATIENT)
Dept: HEMATOLOGY ONCOLOGY | Facility: CLINIC | Age: 36
End: 2021-10-19
Payer: COMMERCIAL

## 2021-10-19 VITALS
HEIGHT: 62 IN | BODY MASS INDEX: 25.3 KG/M2 | HEART RATE: 102 BPM | TEMPERATURE: 98.4 F | DIASTOLIC BLOOD PRESSURE: 80 MMHG | RESPIRATION RATE: 12 BRPM | SYSTOLIC BLOOD PRESSURE: 128 MMHG | OXYGEN SATURATION: 100 % | WEIGHT: 137.5 LBS

## 2021-10-19 DIAGNOSIS — Z90.49 S/P RIGHT HEMICOLECTOMY: ICD-10-CM

## 2021-10-19 DIAGNOSIS — R21 SKIN RASH: ICD-10-CM

## 2021-10-19 DIAGNOSIS — R59.0 MESENTERIC LYMPHADENOPATHY: ICD-10-CM

## 2021-10-19 DIAGNOSIS — Z85.038 HISTORY OF COLON CANCER: Primary | ICD-10-CM

## 2021-10-19 DIAGNOSIS — Z08 ENCOUNTER FOR FOLLOW-UP SURVEILLANCE OF COLON CANCER: ICD-10-CM

## 2021-10-19 DIAGNOSIS — Z85.038 ENCOUNTER FOR FOLLOW-UP SURVEILLANCE OF COLON CANCER: ICD-10-CM

## 2021-10-19 PROCEDURE — 99214 OFFICE O/P EST MOD 30 MIN: CPT | Performed by: INTERNAL MEDICINE

## 2021-10-27 ENCOUNTER — TELEPHONE (OUTPATIENT)
Dept: GASTROENTEROLOGY | Facility: CLINIC | Age: 36
End: 2021-10-27

## 2021-10-28 ENCOUNTER — HOSPITAL ENCOUNTER (OUTPATIENT)
Dept: RADIOLOGY | Age: 36
Discharge: HOME | End: 2021-10-28
Payer: COMMERCIAL

## 2021-10-28 DIAGNOSIS — R59.0 MESENTERIC LYMPHADENOPATHY: ICD-10-CM

## 2021-10-28 DIAGNOSIS — Z08 ENCOUNTER FOR FOLLOW-UP SURVEILLANCE OF COLON CANCER: ICD-10-CM

## 2021-10-28 DIAGNOSIS — Z90.49 S/P RIGHT HEMICOLECTOMY: ICD-10-CM

## 2021-10-28 DIAGNOSIS — Z85.038 ENCOUNTER FOR FOLLOW-UP SURVEILLANCE OF COLON CANCER: ICD-10-CM

## 2021-10-28 DIAGNOSIS — C18.2 PRIMARY ADENOCARCINOMA OF ASCENDING COLON (HCC): ICD-10-CM

## 2021-10-28 LAB — GLUCOSE SERPL-MCNC: 93 MG/DL (ref 65–140)

## 2021-10-28 PROCEDURE — 78815 PET IMAGE W/CT SKULL-THIGH: CPT

## 2021-10-28 PROCEDURE — 82948 REAGENT STRIP/BLOOD GLUCOSE: CPT

## 2021-10-28 PROCEDURE — A9552 F18 FDG: HCPCS

## 2021-11-03 ENCOUNTER — OFFICE VISIT (OUTPATIENT)
Dept: HEMATOLOGY ONCOLOGY | Facility: CLINIC | Age: 36
End: 2021-11-03
Payer: COMMERCIAL

## 2021-11-03 VITALS
SYSTOLIC BLOOD PRESSURE: 144 MMHG | TEMPERATURE: 98 F | HEART RATE: 117 BPM | HEIGHT: 62 IN | OXYGEN SATURATION: 93 % | WEIGHT: 139 LBS | BODY MASS INDEX: 25.58 KG/M2 | DIASTOLIC BLOOD PRESSURE: 88 MMHG | RESPIRATION RATE: 18 BRPM

## 2021-11-03 DIAGNOSIS — R59.0 MESENTERIC LYMPHADENOPATHY: ICD-10-CM

## 2021-11-03 DIAGNOSIS — Z08 ENCOUNTER FOR FOLLOW-UP SURVEILLANCE OF COLON CANCER: ICD-10-CM

## 2021-11-03 DIAGNOSIS — C18.3 CANCER OF HEPATIC FLEXURE (HCC): Primary | ICD-10-CM

## 2021-11-03 DIAGNOSIS — Z85.038 ENCOUNTER FOR FOLLOW-UP SURVEILLANCE OF COLON CANCER: ICD-10-CM

## 2021-11-03 DIAGNOSIS — D64.9 ANEMIA, UNSPECIFIED TYPE: ICD-10-CM

## 2021-11-03 PROCEDURE — 99214 OFFICE O/P EST MOD 30 MIN: CPT | Performed by: STUDENT IN AN ORGANIZED HEALTH CARE EDUCATION/TRAINING PROGRAM

## 2022-01-24 DIAGNOSIS — C18.3 CANCER OF HEPATIC FLEXURE (HCC): Primary | ICD-10-CM

## 2022-01-24 NOTE — PROGRESS NOTES
Call from radiology patient will need a BMP prior to CT Scan on Wednesday  Spoke to Declan Dia, she will be able to get the blood work tomorrow morning  Order for BMP placed

## 2022-01-25 ENCOUNTER — APPOINTMENT (OUTPATIENT)
Dept: LAB | Facility: HOSPITAL | Age: 37
End: 2022-01-25
Payer: COMMERCIAL

## 2022-01-25 DIAGNOSIS — D64.9 ANEMIA, UNSPECIFIED TYPE: ICD-10-CM

## 2022-01-25 DIAGNOSIS — C18.3 CANCER OF HEPATIC FLEXURE (HCC): ICD-10-CM

## 2022-01-25 LAB
ANION GAP SERPL CALCULATED.3IONS-SCNC: 5 MMOL/L (ref 4–13)
BUN SERPL-MCNC: 7 MG/DL (ref 5–25)
CALCIUM SERPL-MCNC: 8.6 MG/DL (ref 8.3–10.1)
CHLORIDE SERPL-SCNC: 106 MMOL/L (ref 100–108)
CO2 SERPL-SCNC: 28 MMOL/L (ref 21–32)
CREAT SERPL-MCNC: 0.79 MG/DL (ref 0.6–1.3)
FERRITIN SERPL-MCNC: 13 NG/ML (ref 8–388)
FOLATE SERPL-MCNC: 8.2 NG/ML (ref 3.1–17.5)
GFR SERPL CREATININE-BSD FRML MDRD: 96 ML/MIN/1.73SQ M
GLUCOSE P FAST SERPL-MCNC: 97 MG/DL (ref 65–99)
IRON SATN MFR SERPL: 11 % (ref 15–50)
IRON SERPL-MCNC: 36 UG/DL (ref 50–170)
POTASSIUM SERPL-SCNC: 4.4 MMOL/L (ref 3.5–5.3)
SODIUM SERPL-SCNC: 139 MMOL/L (ref 136–145)
TIBC SERPL-MCNC: 314 UG/DL (ref 250–450)
VIT B12 SERPL-MCNC: 308 PG/ML (ref 100–900)

## 2022-01-25 PROCEDURE — 80048 BASIC METABOLIC PNL TOTAL CA: CPT

## 2022-01-25 PROCEDURE — 83550 IRON BINDING TEST: CPT

## 2022-01-25 PROCEDURE — 82746 ASSAY OF FOLIC ACID SERUM: CPT

## 2022-01-25 PROCEDURE — 82728 ASSAY OF FERRITIN: CPT

## 2022-01-25 PROCEDURE — 82607 VITAMIN B-12: CPT

## 2022-01-25 PROCEDURE — 36415 COLL VENOUS BLD VENIPUNCTURE: CPT

## 2022-01-25 PROCEDURE — 83540 ASSAY OF IRON: CPT

## 2022-01-26 ENCOUNTER — HOSPITAL ENCOUNTER (OUTPATIENT)
Dept: CT IMAGING | Facility: HOSPITAL | Age: 37
Discharge: HOME | End: 2022-01-26
Payer: COMMERCIAL

## 2022-01-26 DIAGNOSIS — Z85.038 ENCOUNTER FOR FOLLOW-UP SURVEILLANCE OF COLON CANCER: ICD-10-CM

## 2022-01-26 DIAGNOSIS — Z08 ENCOUNTER FOR FOLLOW-UP SURVEILLANCE OF COLON CANCER: ICD-10-CM

## 2022-01-26 DIAGNOSIS — C18.3 CANCER OF HEPATIC FLEXURE (HCC): ICD-10-CM

## 2022-01-26 DIAGNOSIS — R59.0 MESENTERIC LYMPHADENOPATHY: ICD-10-CM

## 2022-01-26 PROCEDURE — G1004 CDSM NDSC: HCPCS

## 2022-01-26 PROCEDURE — 74177 CT ABD & PELVIS W/CONTRAST: CPT

## 2022-01-26 PROCEDURE — 71260 CT THORAX DX C+: CPT

## 2022-01-26 RX ADMIN — IOHEXOL 100 ML: 350 INJECTION, SOLUTION INTRAVENOUS at 10:19

## 2022-02-02 DIAGNOSIS — R11.0 NAUSEA: ICD-10-CM

## 2022-02-02 RX ORDER — FAMOTIDINE 20 MG/1
TABLET, FILM COATED ORAL
Qty: 30 TABLET | Refills: 3 | Status: SHIPPED | OUTPATIENT
Start: 2022-02-02

## 2022-02-08 ENCOUNTER — TELEPHONE (OUTPATIENT)
Dept: HEMATOLOGY ONCOLOGY | Facility: CLINIC | Age: 37
End: 2022-02-08

## 2022-02-08 ENCOUNTER — OFFICE VISIT (OUTPATIENT)
Dept: HEMATOLOGY ONCOLOGY | Facility: CLINIC | Age: 37
End: 2022-02-08
Payer: COMMERCIAL

## 2022-02-08 VITALS
OXYGEN SATURATION: 97 % | TEMPERATURE: 97 F | DIASTOLIC BLOOD PRESSURE: 76 MMHG | HEIGHT: 62 IN | WEIGHT: 142 LBS | BODY MASS INDEX: 26.13 KG/M2 | SYSTOLIC BLOOD PRESSURE: 124 MMHG | RESPIRATION RATE: 12 BRPM | HEART RATE: 126 BPM

## 2022-02-08 DIAGNOSIS — Z85.038 HISTORY OF COLON CANCER: Primary | ICD-10-CM

## 2022-02-08 DIAGNOSIS — Z08 ENCOUNTER FOR FOLLOW-UP SURVEILLANCE OF COLON CANCER: ICD-10-CM

## 2022-02-08 DIAGNOSIS — D50.9 IRON DEFICIENCY ANEMIA, UNSPECIFIED IRON DEFICIENCY ANEMIA TYPE: Primary | ICD-10-CM

## 2022-02-08 DIAGNOSIS — D50.9 IRON DEFICIENCY ANEMIA, UNSPECIFIED IRON DEFICIENCY ANEMIA TYPE: ICD-10-CM

## 2022-02-08 DIAGNOSIS — Z90.49 S/P RIGHT HEMICOLECTOMY: ICD-10-CM

## 2022-02-08 DIAGNOSIS — Z85.038 ENCOUNTER FOR FOLLOW-UP SURVEILLANCE OF COLON CANCER: ICD-10-CM

## 2022-02-08 PROCEDURE — 99214 OFFICE O/P EST MOD 30 MIN: CPT | Performed by: INTERNAL MEDICINE

## 2022-02-08 RX ORDER — IBUPROFEN 800 MG/1
TABLET ORAL
COMMUNITY
Start: 2021-12-09

## 2022-02-08 RX ORDER — SODIUM CHLORIDE 9 MG/ML
20 INJECTION, SOLUTION INTRAVENOUS ONCE
Status: CANCELLED | OUTPATIENT
Start: 2022-02-15

## 2022-02-08 RX ORDER — SODIUM CHLORIDE 9 MG/ML
20 INJECTION, SOLUTION INTRAVENOUS ONCE
Status: CANCELLED | OUTPATIENT
Start: 2022-02-08

## 2022-02-08 NOTE — TELEPHONE ENCOUNTER
Called and left message for patient regarding these appts  Advised patient that she can see these appts on MyChart  She should call back if any of these dates do not work for her

## 2022-02-08 NOTE — TELEPHONE ENCOUNTER
While we try to accommodate patient requests, our priority is to schedule treatment according to Doctor's orders and site availability  1  What would be a preferred day of the week that would work best for your infusion appointment?any      2  Do you prefer mornings or afternoons for your appointments? mornings    3  We are going to try our best to schedule you at the infusion center closest to your home  In the event that we are unable to what would be your next preferred infusion site or sites? 1  [facility]  2    3      4  Do you have transportation to take you to all of your appointments? Yes      5   Would you like the infusion center to draw labs from your port? (disregard if patient doesn't have a port or need labs for infusion appointment)

## 2022-02-08 NOTE — PROGRESS NOTES
[facility] - Hematology & Medical Oncology  Outpatient Visit Encounter Note    Faye Pires 39 y o  female [id number] Date:  2/8/2022      SUBJECTIVE      Initial Visit With Me    Faye Pires is a 39 y o  transferring her care from Dr Diane Winkler  She was last seen by him on 10/13/2020 as an office visit  She was being managed for colon cancer  In review of the chart and talking with the patient, she was diagnosed with [id number] Stage 2A in Fall 2019  She underwent right hemicolectomy and has been on surveillance since  She had an unplanned CT abdomen pelvis on 06/12/2020 for diarrhea assessment that did not show any disease recurrence  Her last CEA was normal on 9/30/2020  She has history of hysterectomy for fibroids and had post-op SBO and resolved then  However, she tells me that she has issues with pelvic pain now and has noticed blood in the stool  She tells me that she has noticed vague symptoms with on and off dyspnea as well  She tells me she is trying to make an appointment gastroenterology to see them as recommended by her PCP  This Visit    No acute complaints office visit  She is working with her GI doctor for episodes of nausea that are getting better with ongoing use of Zofran  Denies any fevers chills chest pain abdominal pain diarrhea blood loss anywhere  I have reviewed the relevant past medical, surgical, social and family history  I have also reviewed allergies and medications for this patient  Review of Systems  Review of Systems   All other systems reviewed and are negative  OBJECTIVE     Physical Exam  Vitals:    02/08/22 0902   BP: 124/76   BP Location: Left arm   Patient Position: Sitting   Pulse: (!) 126   Resp: 12   Temp: (!) 97 °F (36 1 °C)   TempSrc: Tympanic   SpO2: 97%   Weight: 64 4 kg (142 lb)   Height: 5' 2" (1 575 m)       Physical Exam  Vitals reviewed     Constitutional:       General: She is not in acute distress  Appearance: She is not toxic-appearing  HENT:      Head: Normocephalic and atraumatic  Eyes:      General: No scleral icterus  Cardiovascular:      Rate and Rhythm: Normal rate  Pulmonary:      Effort: Pulmonary effort is normal  No respiratory distress  Abdominal:      General: There is no distension  Musculoskeletal:         General: No swelling  Normal range of motion  Cervical back: Normal range of motion and neck supple  Neurological:      General: No focal deficit present  Mental Status: She is alert and oriented to person, place, and time  Mental status is at baseline  Imaging  Relevant imaging reviewed in chart    Labs  Relevant labs reviewed in chart   ASSESSMENT & PLAN      Diagnosis ICD-10-CM Associated Orders   1  History of colon cancer  Z85 038 CT chest abdomen pelvis w contrast     CEA   2  Encounter for follow-up surveillance of colon cancer  Z08 CT chest abdomen pelvis w contrast    Z85 038 CEA   3  S/P right hemicolectomy  Z90 49 CT chest abdomen pelvis w contrast   4  Iron deficiency anemia, unspecified iron deficiency anemia type  D50 9 Iron Panel (Includes Ferritin, Iron Sat%, Iron, and TIBC)     CBC         39 y o  female diagnosed with Stage IIA hepatic flexure adenocarcinoma in Fall 2019  She is status-post right hemicolectomy  She is under surveillance  See below for details   Goals of care/patient communication  o I reviewed her re-staging scan with her  I reviewed the NCCN guidelines with her  Hence, the surveillance will follow accordingly  o Surveillance Plan (Appx End Fall 2024)  - CT TAP - q6-12m unless clinically indicated otherwise  - CEA - q6m  - H&P - q6m  o I reviewed her labs and told her that she has iron deficiency  At this time there is no clear clinical etiology to explain this low iron state  She has a history of hysterectomy  Given her recent CT scans her cancer remains in a surveillance state    I reviewed the options of management with either oral or intravenous iron therapy  After explaining the benefit and risk of each approach, she told me she wishes to go ahead with the intravenous approach  As the package insert for Venofer was reviewed with her and thereafter ordered as well accordingly   TNM/Staging At Diagnosis  o T3N0M0  o Stage 2A   Disease Features/Tumor Markers/Genetics  o Notable Path Features: MSI-Stable  o Genetics 11/19/2019 - negative   Treatment  o SP hemicolectomy      Follow Up  Three months from now with repeat labs including CBC iron panel and CEA  And then also 2 weeks after her restaging CT scan    All questions were answered to the patient's satisfaction during this encounter  They appreciated and thanked me for spending time with them  The patient knows the contact information for our office and know to reach out for any relevant concerns related to this encounter  For all other listed problems and medical diagnosis in his chart - they are managed by PCP and/or other specialists, which patient acknowledges  Mary Espinoza MD  Hematology & Medical Oncology

## 2022-02-15 ENCOUNTER — HOSPITAL ENCOUNTER (OUTPATIENT)
Dept: INFUSION CENTER | Facility: CLINIC | Age: 37
Discharge: HOME | End: 2022-02-15
Payer: COMMERCIAL

## 2022-02-15 VITALS
HEART RATE: 102 BPM | SYSTOLIC BLOOD PRESSURE: 132 MMHG | TEMPERATURE: 97.8 F | DIASTOLIC BLOOD PRESSURE: 85 MMHG | RESPIRATION RATE: 18 BRPM

## 2022-02-15 DIAGNOSIS — D50.9 IRON DEFICIENCY ANEMIA, UNSPECIFIED IRON DEFICIENCY ANEMIA TYPE: Primary | ICD-10-CM

## 2022-02-15 PROCEDURE — 96365 THER/PROPH/DIAG IV INF INIT: CPT

## 2022-02-15 RX ORDER — SODIUM CHLORIDE 9 MG/ML
20 INJECTION, SOLUTION INTRAVENOUS ONCE
Status: COMPLETED | OUTPATIENT
Start: 2022-02-15 | End: 2022-02-15

## 2022-02-15 RX ORDER — SODIUM CHLORIDE 9 MG/ML
20 INJECTION, SOLUTION INTRAVENOUS ONCE
Status: CANCELLED | OUTPATIENT
Start: 2022-02-22

## 2022-02-15 RX ADMIN — IRON SUCROSE 300 MG: 20 INJECTION, SOLUTION INTRAVENOUS at 09:19

## 2022-02-15 RX ADMIN — SODIUM CHLORIDE 20 ML/HR: 0.9 INJECTION, SOLUTION INTRAVENOUS at 09:15

## 2022-02-15 NOTE — PROGRESS NOTES
Pt here venofer, offering no complaints  Left AC IV placed with positive blood return noted  Tolerated infusion without incident  PIV removed  AVS declined  Walked out in stable condition

## 2022-02-22 ENCOUNTER — HOSPITAL ENCOUNTER (OUTPATIENT)
Dept: INFUSION CENTER | Facility: CLINIC | Age: 37
Discharge: HOME | End: 2022-02-22
Payer: COMMERCIAL

## 2022-02-22 VITALS
TEMPERATURE: 98.1 F | HEART RATE: 116 BPM | DIASTOLIC BLOOD PRESSURE: 71 MMHG | RESPIRATION RATE: 18 BRPM | SYSTOLIC BLOOD PRESSURE: 126 MMHG

## 2022-02-22 DIAGNOSIS — D50.9 IRON DEFICIENCY ANEMIA, UNSPECIFIED IRON DEFICIENCY ANEMIA TYPE: Primary | ICD-10-CM

## 2022-02-22 PROCEDURE — 96365 THER/PROPH/DIAG IV INF INIT: CPT

## 2022-02-22 RX ORDER — SODIUM CHLORIDE 9 MG/ML
20 INJECTION, SOLUTION INTRAVENOUS ONCE
Status: COMPLETED | OUTPATIENT
Start: 2022-02-22 | End: 2022-02-22

## 2022-02-22 RX ORDER — SODIUM CHLORIDE 9 MG/ML
20 INJECTION, SOLUTION INTRAVENOUS ONCE
Status: CANCELLED | OUTPATIENT
Start: 2022-03-01

## 2022-02-22 RX ADMIN — SODIUM CHLORIDE 20 ML/HR: 9 INJECTION, SOLUTION INTRAVENOUS at 10:16

## 2022-02-22 RX ADMIN — IRON SUCROSE 300 MG: 20 INJECTION, SOLUTION INTRAVENOUS at 10:16

## 2022-03-01 ENCOUNTER — HOSPITAL ENCOUNTER (OUTPATIENT)
Dept: INFUSION CENTER | Facility: CLINIC | Age: 37
Discharge: HOME | End: 2022-03-01
Payer: COMMERCIAL

## 2022-03-01 VITALS
SYSTOLIC BLOOD PRESSURE: 116 MMHG | TEMPERATURE: 98 F | RESPIRATION RATE: 18 BRPM | DIASTOLIC BLOOD PRESSURE: 60 MMHG | HEART RATE: 103 BPM

## 2022-03-01 DIAGNOSIS — D50.9 IRON DEFICIENCY ANEMIA, UNSPECIFIED IRON DEFICIENCY ANEMIA TYPE: Primary | ICD-10-CM

## 2022-03-01 PROCEDURE — 96365 THER/PROPH/DIAG IV INF INIT: CPT

## 2022-03-01 RX ORDER — SODIUM CHLORIDE 9 MG/ML
20 INJECTION, SOLUTION INTRAVENOUS ONCE
Status: COMPLETED | OUTPATIENT
Start: 2022-03-01 | End: 2022-03-01

## 2022-03-01 RX ORDER — SODIUM CHLORIDE 9 MG/ML
20 INJECTION, SOLUTION INTRAVENOUS ONCE
Status: CANCELLED | OUTPATIENT
Start: 2022-03-08

## 2022-03-01 RX ADMIN — IRON SUCROSE 300 MG: 20 INJECTION, SOLUTION INTRAVENOUS at 08:43

## 2022-03-01 RX ADMIN — SODIUM CHLORIDE 20 ML/HR: 9 INJECTION, SOLUTION INTRAVENOUS at 08:42

## 2022-03-08 ENCOUNTER — HOSPITAL ENCOUNTER (OUTPATIENT)
Dept: INFUSION CENTER | Facility: CLINIC | Age: 37
Discharge: HOME | End: 2022-03-08
Payer: COMMERCIAL

## 2022-03-08 VITALS
DIASTOLIC BLOOD PRESSURE: 70 MMHG | HEART RATE: 110 BPM | TEMPERATURE: 97.3 F | SYSTOLIC BLOOD PRESSURE: 111 MMHG | RESPIRATION RATE: 16 BRPM

## 2022-03-08 DIAGNOSIS — D50.9 IRON DEFICIENCY ANEMIA, UNSPECIFIED IRON DEFICIENCY ANEMIA TYPE: Primary | ICD-10-CM

## 2022-03-08 PROCEDURE — 96366 THER/PROPH/DIAG IV INF ADDON: CPT

## 2022-03-08 PROCEDURE — 96365 THER/PROPH/DIAG IV INF INIT: CPT

## 2022-03-08 RX ORDER — SODIUM CHLORIDE 9 MG/ML
20 INJECTION, SOLUTION INTRAVENOUS ONCE
Status: CANCELLED | OUTPATIENT
Start: 2022-03-15

## 2022-03-08 RX ORDER — SODIUM CHLORIDE 9 MG/ML
20 INJECTION, SOLUTION INTRAVENOUS ONCE
Status: COMPLETED | OUTPATIENT
Start: 2022-03-08 | End: 2022-03-08

## 2022-03-08 RX ADMIN — SODIUM CHLORIDE 20 ML/HR: 9 INJECTION, SOLUTION INTRAVENOUS at 09:22

## 2022-03-08 RX ADMIN — IRON SUCROSE 300 MG: 20 INJECTION, SOLUTION INTRAVENOUS at 09:35

## 2022-03-08 NOTE — PROGRESS NOTES
Pt toelrated todays venofer infusion well  No adverse reactions noted  peripheraliv discontinued jelco intact  dischargedambulatory with avs

## 2022-03-15 ENCOUNTER — HOSPITAL ENCOUNTER (OUTPATIENT)
Dept: INFUSION CENTER | Facility: CLINIC | Age: 37
Discharge: HOME | End: 2022-03-15
Payer: COMMERCIAL

## 2022-03-15 VITALS
TEMPERATURE: 97.7 F | HEART RATE: 108 BPM | DIASTOLIC BLOOD PRESSURE: 70 MMHG | RESPIRATION RATE: 16 BRPM | SYSTOLIC BLOOD PRESSURE: 127 MMHG

## 2022-03-15 DIAGNOSIS — D50.9 IRON DEFICIENCY ANEMIA, UNSPECIFIED IRON DEFICIENCY ANEMIA TYPE: Primary | ICD-10-CM

## 2022-03-15 PROCEDURE — 96365 THER/PROPH/DIAG IV INF INIT: CPT

## 2022-03-15 RX ORDER — SODIUM CHLORIDE 9 MG/ML
20 INJECTION, SOLUTION INTRAVENOUS ONCE
Status: COMPLETED | OUTPATIENT
Start: 2022-03-15 | End: 2022-03-15

## 2022-03-15 RX ORDER — SODIUM CHLORIDE 9 MG/ML
20 INJECTION, SOLUTION INTRAVENOUS ONCE
Status: CANCELLED | OUTPATIENT
Start: 2022-03-15

## 2022-03-15 RX ADMIN — SODIUM CHLORIDE 20 ML/HR: 9 INJECTION, SOLUTION INTRAVENOUS at 09:41

## 2022-03-15 RX ADMIN — IRON SUCROSE 300 MG: 20 INJECTION, SOLUTION INTRAVENOUS at 09:43

## 2022-03-15 NOTE — PROGRESS NOTES
Pt to clinic for final venofer treatment, offers no complaints today, tolerated infusion without complications, aware this is her last infusion appointment, PIV removed, avs declined

## 2022-03-17 ENCOUNTER — TELEPHONE (OUTPATIENT)
Dept: HEMATOLOGY ONCOLOGY | Facility: CLINIC | Age: 37
End: 2022-03-17

## 2022-03-17 NOTE — TELEPHONE ENCOUNTER
I called the patient and left a message with the information that her appointment on 6/8 with Tammy Cnanon was rescheduled to Dr Crow Narayan schedule on 6/08 @ 8:40 am due to the provider leaving the network

## 2022-03-24 ENCOUNTER — OFFICE VISIT (OUTPATIENT)
Dept: OBGYN CLINIC | Age: 37
End: 2022-03-24
Payer: COMMERCIAL

## 2022-03-24 VITALS — BODY MASS INDEX: 26.5 KG/M2 | HEIGHT: 62 IN | WEIGHT: 144 LBS

## 2022-03-24 DIAGNOSIS — R31.0 HEMATURIA, GROSS: Primary | ICD-10-CM

## 2022-03-24 DIAGNOSIS — Z11.3 SCREEN FOR STD (SEXUALLY TRANSMITTED DISEASE): ICD-10-CM

## 2022-03-24 PROBLEM — R11.2 PONV (POSTOPERATIVE NAUSEA AND VOMITING): Status: RESOLVED | Noted: 2020-10-21 | Resolved: 2022-03-24

## 2022-03-24 PROBLEM — Z01.419 ENCOUNTER FOR GYNECOLOGICAL EXAMINATION WITHOUT ABNORMAL FINDING: Status: RESOLVED | Noted: 2020-07-20 | Resolved: 2022-03-24

## 2022-03-24 PROBLEM — Z87.891 EX-SMOKER: Status: ACTIVE | Noted: 2018-11-27

## 2022-03-24 PROBLEM — Z85.038 ENCOUNTER FOR FOLLOW-UP SURVEILLANCE OF COLON CANCER: Status: RESOLVED | Noted: 2019-11-01 | Resolved: 2022-03-24

## 2022-03-24 PROBLEM — C18.3 CANCER OF HEPATIC FLEXURE (HCC): Status: ACTIVE | Noted: 2019-10-16

## 2022-03-24 PROBLEM — Z98.890 PONV (POSTOPERATIVE NAUSEA AND VOMITING): Status: RESOLVED | Noted: 2020-10-21 | Resolved: 2022-03-24

## 2022-03-24 PROBLEM — E55.9 VITAMIN D DEFICIENCY: Status: ACTIVE | Noted: 2018-11-27

## 2022-03-24 PROBLEM — Z79.899 MEDICAL MARIJUANA USE: Status: ACTIVE | Noted: 2019-08-01

## 2022-03-24 PROBLEM — F31.13 BIPOLAR DISORDER WITH SEVERE MANIA (HCC): Status: ACTIVE | Noted: 2018-11-27

## 2022-03-24 PROBLEM — R10.2 PELVIC PAIN: Status: RESOLVED | Noted: 2020-07-20 | Resolved: 2022-03-24

## 2022-03-24 PROBLEM — J45.20 MILD INTERMITTENT ASTHMA WITHOUT COMPLICATION: Status: ACTIVE | Noted: 2020-02-18

## 2022-03-24 PROBLEM — Z08 ENCOUNTER FOR FOLLOW-UP SURVEILLANCE OF COLON CANCER: Status: RESOLVED | Noted: 2019-11-01 | Resolved: 2022-03-24

## 2022-03-24 LAB
BACTERIA UR QL AUTO: ABNORMAL /HPF
BILIRUB UR QL STRIP: NEGATIVE
CLARITY UR: CLEAR
COLOR UR: ABNORMAL
GLUCOSE UR STRIP-MCNC: NEGATIVE MG/DL
HGB UR QL STRIP.AUTO: ABNORMAL
KETONES UR STRIP-MCNC: NEGATIVE MG/DL
LEUKOCYTE ESTERASE UR QL STRIP: ABNORMAL
MUCOUS THREADS UR QL AUTO: ABNORMAL
NITRITE UR QL STRIP: NEGATIVE
NON-SQ EPI CELLS URNS QL MICRO: ABNORMAL /HPF
PH UR STRIP.AUTO: 7 [PH]
PROT UR STRIP-MCNC: NEGATIVE MG/DL
RBC #/AREA URNS AUTO: ABNORMAL /HPF
SP GR UR STRIP.AUTO: 1.01 (ref 1–1.03)
UROBILINOGEN UR STRIP-ACNC: <2 MG/DL
WBC #/AREA URNS AUTO: ABNORMAL /HPF

## 2022-03-24 PROCEDURE — 99213 OFFICE O/P EST LOW 20 MIN: CPT | Performed by: NURSE PRACTITIONER

## 2022-03-24 PROCEDURE — 87077 CULTURE AEROBIC IDENTIFY: CPT | Performed by: NURSE PRACTITIONER

## 2022-03-24 PROCEDURE — 87086 URINE CULTURE/COLONY COUNT: CPT | Performed by: NURSE PRACTITIONER

## 2022-03-24 PROCEDURE — 87491 CHLMYD TRACH DNA AMP PROBE: CPT | Performed by: NURSE PRACTITIONER

## 2022-03-24 PROCEDURE — 87591 N.GONORRHOEAE DNA AMP PROB: CPT | Performed by: NURSE PRACTITIONER

## 2022-03-24 PROCEDURE — 81001 URINALYSIS AUTO W/SCOPE: CPT | Performed by: NURSE PRACTITIONER

## 2022-03-24 RX ORDER — NITROFURANTOIN 25; 75 MG/1; MG/1
100 CAPSULE ORAL 2 TIMES DAILY
Qty: 6 CAPSULE | Refills: 0 | Status: SHIPPED | OUTPATIENT
Start: 2022-03-24 | End: 2022-03-27

## 2022-03-24 NOTE — PATIENT INSTRUCTIONS
Hematuria   AMBULATORY CARE:   Hematuria  is blood in your urine  Your urine may be bright red to dark brown  Signs and symptoms:   · Fever    · Nausea and vomiting    · Pain or bruising on your lower back or sides    · Pain or burning when you urinate    · More urination than usual, or the need to urinate right away    · Blood clots in the toilet after you urinate    Seek care immediately if:   · You have blood in your urine after a new injury, such as a fall  · You have severe back or side pain that does not go away with treatment  Call your doctor if:   · You are urinating very small amounts or not at all  · You feel like you cannot empty your bladder  · You have a fever that gets worse or does not go away with treatment  · You cannot keep liquids or medicines down  · Your urine gets darker, even after you drink extra liquids  · You have questions or concerns about your condition, treatment, or care  Treatment for hematuria  depends on the cause of your hematuria  Treatment may not be needed  You may need medicines to treat an infection  Ask your healthcare provider for more information about the treatment you may need  Drink liquids as directed: You may need to drink extra liquids to help flush the blood from your body through your urine  Water is the best liquid to drink  Ask how much liquid to drink each day and which liquids are best for you  Follow up with your doctor as directed:  Write down your questions so you remember to ask them during your visits  © Copyright [employer] 2022 Information is for End User's use only and may not be sold, redistributed or otherwise used for commercial purposes  All illustrations and images included in CareNotes® are the copyrighted property of A D A M , Inc  or Sharmaine Leone  The above information is an  only  It is not intended as medical advice for individual conditions or treatments   Talk to your doctor, nurse or pharmacist before following any medical regimen to see if it is safe and effective for you

## 2022-03-24 NOTE — PROGRESS NOTES
Diagnoses and all orders for this visit:    Hematuria, gross  -     nitrofurantoin (MACROBID) 100 mg capsule; Take 1 capsule (100 mg total) by mouth 2 (two) times a day for 3 days    Screen for STD (sexually transmitted disease)  -     Chlamydia/GC amplified DNA by PCR        Call if no symptom improvement, all questions answered, return for annual  Will consider Urology referral pending results        Pleasant 40 y o  here for urinary complaints of stinging and frequent urination since Saturday  She is also requesting STD testing because her partner was  from her last year for 4 months  She denies vaginal discharge, itching, or odor  She states she sees blood in her urine with occasional clots since Saturday  She does not feel it is sexually related since the bleeding happened prior to sex  She is currently under the care of an oncologist for colon cancer and hepatic flexure cancer  She denies current smoking but does use medical marijuana  She denies fever or pelvic pain      Past Medical History:   Diagnosis Date    Anxiety     Asthma     Breast cyst, left     Cancer [facility] [address] ) 2019    colon    Depression     Disease of thyroid gland     Pt hasn't seen physician regarding this since 2009    Irritable bowel syndrome     Uterine fibroid      Past Surgical History:   Procedure Laterality Date    COLONOSCOPY      CYSTOSCOPY N/A 10/21/2020    Procedure: CYSTOSCOPY;  Surgeon: Oswaldo Augustin MD;  [facility];  Service: Gynecology    HEMORRHOID SURGERY      IR PICC PLACEMENT SINGLE LUMEN  10/30/2020    [place] LAP,SURG,COLECTOMY, PARTIAL, W/ANAST N/A 11/12/2019    Procedure: ROBOTIC ASSISTED ILEOCOLECTOMY;  Surgeon: Curry Manriquez MD;  [facility];  Service: Robotics    [place] LAPAROSCOPY W TOT HYSTERECTUTERUS <=250 [address]  W TUBE/OVARY Bilateral 10/21/2020    Procedure: ROBOTIC ASSISTED LAPAROSCOPIC HYSTERECTOMY BILATERAL SALPINGECTOMY;  Surgeon: Oswaldo Augustin MD;  [facility];  Service: Gynecology     Social History     Tobacco Use    Smoking status: Former Smoker     Quit date:      Years since quittin 2    Smokeless tobacco: Never Used   Vaping Use    Vaping Use: Every day   Substance Use Topics    Alcohol use:  Yes     Alcohol/week: 1 0 standard drink     Types: 1 Glasses of wine per week    Drug use: Yes     Types: Marijuana     Comment: medical marijuana     Family History   Problem Relation Age of Onset    Thyroid cancer Mother     Diabetes Father     Heart disease Father     Colon polyps Maternal Uncle     Breast cancer Maternal Grandmother 52    Prostate cancer Paternal Grandfather     Pancreatic cancer Paternal Grandfather     Colon polyps Paternal Aunt     No Known Problems Sister     No Known Problems Brother     No Known Problems Daughter     No Known Problems Brother     No Known Problems Daughter     Colon cancer Neg Hx     Ovarian cancer Neg Hx     Uterine cancer Neg Hx     Cervical cancer Neg Hx        Current Outpatient Medications:     acetaminophen (TYLENOL) 325 mg tablet, Take 650 mg by mouth every 6 (six) hours as needed for mild pain, Disp: , Rfl:     albuterol (2 5 mg/3 mL) 0 083 % nebulizer solution, 2 5 mg every 4 (four) hours as needed , Disp: , Rfl:     amitriptyline (ELAVIL) 25 mg tablet, Take 150 mg by mouth  , Disp: , Rfl:     famotidine (PEPCID) 20 mg tablet, TAKE 1 TABLET BY MOUTH DAILY AT BEDTIME, Disp: 30 tablet, Rfl: 3    ibuprofen (MOTRIN) 800 mg tablet, , Disp: , Rfl:     metoclopramide (REGLAN) 5 mg tablet, Take 1 tablet (5 mg total) by mouth 4 (four) times a day, Disp: 120 tablet, Rfl: 6    Multiple Vitamins-Minerals (HAIR SKIN AND NAILS FORMULA PO), Take by mouth, Disp: , Rfl:     ondansetron (Zofran ODT) 4 mg disintegrating tablet, Take 1 tablet (4 mg total) by mouth every 6 (six) hours as needed for nausea or vomiting, Disp: 60 tablet, Rfl: 3    other medication, see sig,, Medical Marijuana, Disp: , Rfl:     pantoprazole (PROTONIX) 40 mg tablet, Take 1 tablet (40 mg total) by mouth daily, Disp: 30 tablet, Rfl: 0    nitrofurantoin (MACROBID) 100 mg capsule, Take 1 capsule (100 mg total) by mouth 2 (two) times a day for 3 days, Disp: 6 capsule, Rfl: 0    Allergies   Allergen Reactions    Nuts - Food Allergy      GI distress    Peanut-Containing Drug Products - Food Allergy      GI distress     OB History    Para Term  AB Living   2 2 2     2   SAB IAB Ectopic Multiple Live Births           2      # Outcome Date GA Lbr Danie/2nd Weight Sex Delivery Anes PTL Lv   2 Term            1 Term              2 daughter 15 and 11 yrs old    Vitals:    22 1301   Weight: 65 3 kg (144 lb)   Height: 5' 2" (1 575 m)     Body mass index is 26 34 kg/m²  Patient's last menstrual period was 2020 (exact date)  Review of Systems   Constitutional: Negative for chills, fatigue, fever and unexpected weight change  Respiratory: Negative for shortness of breath  Gastrointestinal: Negative for anal bleeding, blood in stool, constipation and diarrhea  Genitourinary: Negative for difficulty urinating, dysuria and hematuria  Physical Exam   Constitutional: She appears well-developed and well-nourished  No distress  Alert and oriented  HENT: atraumatic  Head: Normocephalic  Neck: Normal range of motion  Neck supple  Pulmonary: Effort normal   Abdominal: Soft  Pelvic exam was performed with patient supine  No labial fusion  There is no rash, tenderness, lesion or injury on the right labia  There is no rash, tenderness, lesion or injury on the left labia  Urethral meatus does not show any tenderness, inflammation or discharge  Palpation of midline bladder without pain or discomfort  Uterus and cervix are surgically absent  Right adnexum displays no mass, no tenderness and no fullness  Left adnexum displays no mass, no tenderness and no fullness  No erythema or tenderness in the vagina   No foreign body in the vagina  No signs of injury around the vagina  NO Vaginal discharge found  No blood in the vagina seen  Perineum and anus without areas of injury  No lesions noted or swelling  Lymphadenopathy:        Right: No inguinal adenopathy present  Left: No inguinal adenopathy present     +3 blood on her dip  +3 leuks

## 2022-03-26 LAB
BACTERIA UR CULT: ABNORMAL
C TRACH DNA SPEC QL NAA+PROBE: NEGATIVE
N GONORRHOEA DNA SPEC QL NAA+PROBE: NEGATIVE

## 2022-03-29 ENCOUNTER — TELEPHONE (OUTPATIENT)
Dept: OBGYN CLINIC | Facility: CLINIC | Age: 37
End: 2022-03-29

## 2022-03-29 DIAGNOSIS — B37.49 CANDIDA INFECTION OF GENITAL REGION: Primary | ICD-10-CM

## 2022-03-29 RX ORDER — FLUCONAZOLE 150 MG/1
150 TABLET ORAL ONCE
Qty: 2 TABLET | Refills: 0 | Status: SHIPPED | OUTPATIENT
Start: 2022-03-29 | End: 2022-03-29

## 2022-03-29 NOTE — TELEPHONE ENCOUNTER
----- Message from Amaris Valenzuela sent at 3/28/2022 10:25 PM EDT -----  Regarding: Follow Up  Hello, I am doing better and the bleeding has stopped  But I l still have alittle irritation

## 2022-03-29 NOTE — TELEPHONE ENCOUNTER
I am glad you are feeling better  You may have irritation from the antibiotics so I will send some yeast medication your pharmacy  Take care and keep me posted

## 2022-05-07 ENCOUNTER — APPOINTMENT (OUTPATIENT)
Dept: LAB | Facility: CLINIC | Age: 37
End: 2022-05-07
Payer: COMMERCIAL

## 2022-05-07 DIAGNOSIS — Z85.038 HISTORY OF COLON CANCER: ICD-10-CM

## 2022-05-07 DIAGNOSIS — D50.9 IRON DEFICIENCY ANEMIA, UNSPECIFIED IRON DEFICIENCY ANEMIA TYPE: ICD-10-CM

## 2022-05-07 DIAGNOSIS — Z08 ENCOUNTER FOR FOLLOW-UP SURVEILLANCE OF COLON CANCER: ICD-10-CM

## 2022-05-07 DIAGNOSIS — Z85.038 ENCOUNTER FOR FOLLOW-UP SURVEILLANCE OF COLON CANCER: ICD-10-CM

## 2022-05-07 LAB
CEA SERPL-MCNC: 1.2 NG/ML (ref 0–3)
ERYTHROCYTE [DISTWIDTH] IN BLOOD BY AUTOMATED COUNT: 13 % (ref 11.6–15.1)
FERRITIN SERPL-MCNC: 278 NG/ML (ref 8–388)
HCT VFR BLD AUTO: 36.3 % (ref 34.8–46.1)
HGB BLD-MCNC: 12.1 G/DL (ref 11.5–15.4)
IRON SATN MFR SERPL: 23 % (ref 15–50)
IRON SERPL-MCNC: 55 UG/DL (ref 50–170)
MCH RBC QN AUTO: 30.1 PG (ref 26.8–34.3)
MCHC RBC AUTO-ENTMCNC: 33.3 G/DL (ref 31.4–37.4)
MCV RBC AUTO: 90 FL (ref 82–98)
PLATELET # BLD AUTO: 285 THOUSANDS/UL (ref 149–390)
PMV BLD AUTO: 11.4 FL (ref 8.9–12.7)
RBC # BLD AUTO: 4.02 MILLION/UL (ref 3.81–5.12)
TIBC SERPL-MCNC: 244 UG/DL (ref 250–450)
WBC # BLD AUTO: 6.08 THOUSAND/UL (ref 4.31–10.16)

## 2022-05-07 PROCEDURE — 85027 COMPLETE CBC AUTOMATED: CPT

## 2022-05-07 PROCEDURE — 83540 ASSAY OF IRON: CPT

## 2022-05-07 PROCEDURE — 83550 IRON BINDING TEST: CPT

## 2022-05-07 PROCEDURE — 36415 COLL VENOUS BLD VENIPUNCTURE: CPT

## 2022-05-07 PROCEDURE — 82378 CARCINOEMBRYONIC ANTIGEN: CPT

## 2022-05-07 PROCEDURE — 82728 ASSAY OF FERRITIN: CPT

## 2022-05-10 ENCOUNTER — OFFICE VISIT (OUTPATIENT)
Dept: HEMATOLOGY ONCOLOGY | Facility: CLINIC | Age: 37
End: 2022-05-10
Payer: COMMERCIAL

## 2022-05-10 VITALS
HEIGHT: 62 IN | WEIGHT: 138 LBS | DIASTOLIC BLOOD PRESSURE: 68 MMHG | BODY MASS INDEX: 25.4 KG/M2 | HEART RATE: 105 BPM | TEMPERATURE: 97.9 F | OXYGEN SATURATION: 96 % | RESPIRATION RATE: 16 BRPM | SYSTOLIC BLOOD PRESSURE: 122 MMHG

## 2022-05-10 DIAGNOSIS — D50.9 IRON DEFICIENCY ANEMIA, UNSPECIFIED IRON DEFICIENCY ANEMIA TYPE: ICD-10-CM

## 2022-05-10 DIAGNOSIS — Z85.038 ENCOUNTER FOR FOLLOW-UP SURVEILLANCE OF COLON CANCER: ICD-10-CM

## 2022-05-10 DIAGNOSIS — Z08 ENCOUNTER FOR FOLLOW-UP SURVEILLANCE OF COLON CANCER: ICD-10-CM

## 2022-05-10 DIAGNOSIS — C18.3 CANCER OF HEPATIC FLEXURE (HCC): Primary | ICD-10-CM

## 2022-05-10 PROCEDURE — 99214 OFFICE O/P EST MOD 30 MIN: CPT | Performed by: INTERNAL MEDICINE

## 2022-05-10 NOTE — PROGRESS NOTES
[address] [facility] - Hematology & Medical Oncology  Outpatient Visit Encounter Note    Abdelrahman Manning 40 y o  female [id number] Date:  5/10/2022      SUBJECTIVE      Initial Visit With Me    Abdelrahman Manning is a 40 y o  transferring her care from Dr Magaly Montoya  She was last seen by him on 10/13/2020 as an office visit  She was being managed for colon cancer  In review of the chart and talking with the patient, she was diagnosed with [id number] Stage 2A in Fall 2019  She underwent right hemicolectomy and has been on surveillance since  She had an unplanned CT abdomen pelvis on 06/12/2020 for diarrhea assessment that did not show any disease recurrence  Her last CEA was normal on 9/30/2020  She has history of hysterectomy for fibroids and had post-op SBO and resolved then  However, she tells me that she has issues with pelvic pain now and has noticed blood in the stool  She tells me that she has noticed vague symptoms with on and off dyspnea as well  She tells me she is trying to make an appointment gastroenterology to see them as recommended by her PCP  This Visit    Doing well  No acute complaints  Working with GI for her bowel movements and other GI issues  Denies blood in stool  Has episodes of abdominal discomfort  No lumps or bumps  I have reviewed the relevant past medical, surgical, social and family history  I have also reviewed allergies and medications for this patient  Review of Systems  Review of Systems   All other systems reviewed and are negative  OBJECTIVE     Physical Exam  Vitals:    05/10/22 0923   BP: 122/68   BP Location: Left arm   Patient Position: Sitting   Cuff Size: Adult   Pulse: 105   Resp: 16   Temp: 97 9 °F (36 6 °C)   SpO2: 96%   Weight: 62 6 kg (138 lb)   Height: 5' 2" (1 575 m)       Physical Exam  Vitals reviewed  Constitutional:       General: She is not in acute distress  Appearance: She is not toxic-appearing  HENT:      Head: Normocephalic and atraumatic  Eyes:      General: No scleral icterus  Cardiovascular:      Rate and Rhythm: Normal rate  Pulmonary:      Effort: Pulmonary effort is normal  No respiratory distress  Abdominal:      General: There is no distension  Musculoskeletal:         General: No swelling  Normal range of motion  Cervical back: Normal range of motion and neck supple  Neurological:      General: No focal deficit present  Mental Status: She is alert and oriented to person, place, and time  Mental status is at baseline  Imaging  Relevant imaging reviewed in chart    Labs  Relevant labs reviewed in chart   ASSESSMENT & PLAN      Diagnosis ICD-10-CM Associated Orders   1  Cancer of hepatic flexure ([facility] [address] )  C18 3    2  Encounter for follow-up surveillance of colon cancer  Z08     Z85 038    3  Iron deficiency anemia, unspecified iron deficiency anemia type  D50 9          40 y o  female diagnosed with Stage IIA hepatic flexure adenocarcinoma in Fall 2019  She is status-post right hemicolectomy  She is under surveillance  See below for details   Goals of care/patient communication  o I reviewed her re-staging scan with her  I reviewed the NCCN guidelines with her  Hence, the surveillance will follow accordingly  o Surveillance Plan (Appx End Fall 2024)  - CT TAP - q6-12m unless clinically indicated otherwise  - CEA - q6m  - H&P - q6m  o Labs indicate that iron deficiency has resolved  Recommend she takes a daily multivitamin that contains iron   TNM/Staging At Diagnosis  o T3N0M0  o Stage 2A   Disease Features/Tumor Markers/Genetics  o Notable Path Features: MSI-Stable  o Genetics 11/19/2019 - negative   Treatment  o SP hemicolectomy      Follow Up  Cancel 6/8 appointment  Continue 8/23 appointment    All questions were answered to the patient's satisfaction during this encounter  They appreciated and thanked me for spending time with them   The patient knows the contact information for our office and know to reach out for any relevant concerns related to this encounter  For all other listed problems and medical diagnosis in his chart - they are managed by PCP and/or other specialists, which patient acknowledges  Mary Larry MD  Hematology & Medical Oncology

## 2022-06-23 ENCOUNTER — APPOINTMENT (EMERGENCY)
Dept: CT IMAGING | Facility: HOSPITAL | Age: 37
End: 2022-06-23

## 2022-06-23 ENCOUNTER — HOSPITAL ENCOUNTER (EMERGENCY)
Facility: HOSPITAL | Age: 37
Discharge: HOME | End: 2022-06-23
Attending: EMERGENCY MEDICINE | Admitting: EMERGENCY MEDICINE
Payer: COMMERCIAL

## 2022-06-23 VITALS
SYSTOLIC BLOOD PRESSURE: 117 MMHG | OXYGEN SATURATION: 100 % | TEMPERATURE: 98.1 F | HEIGHT: 62 IN | WEIGHT: 136.02 LBS | HEART RATE: 80 BPM | RESPIRATION RATE: 18 BRPM | BODY MASS INDEX: 25.03 KG/M2 | DIASTOLIC BLOOD PRESSURE: 74 MMHG

## 2022-06-23 DIAGNOSIS — S16.1XXA CERVICAL STRAIN: ICD-10-CM
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
## 2022-06-23 PROCEDURE — 99284 EMERGENCY DEPT VISIT MOD MDM: CPT

## 2022-06-23 PROCEDURE — 96372 THER/PROPH/DIAG INJ SC/IM: CPT

## 2022-06-23 PROCEDURE — 70450 CT HEAD/BRAIN W/O DYE: CPT

## 2022-06-23 PROCEDURE — 99284 EMERGENCY DEPT VISIT MOD MDM: CPT | Performed by: EMERGENCY MEDICINE

## 2022-06-23 PROCEDURE — 72125 CT NECK SPINE W/O DYE: CPT

## 2022-06-23 RX ORDER — KETOROLAC TROMETHAMINE 30 MG/ML
15 INJECTION, SOLUTION INTRAMUSCULAR; INTRAVENOUS ONCE
Status: COMPLETED | OUTPATIENT
Start: 2022-06-23 | End: 2022-06-23

## 2022-06-23 RX ORDER — ACETAMINOPHEN 325 MG/1
975 TABLET ORAL ONCE
Status: DISCONTINUED | OUTPATIENT
Start: 2022-06-23 | End: 2022-06-23 | Stop reason: HOSPADM

## 2022-06-23 RX ORDER — ONDANSETRON 4 MG/1
4 TABLET, ORALLY DISINTEGRATING ORAL ONCE
Status: COMPLETED | OUTPATIENT
Start: 2022-06-23 | End: 2022-06-23

## 2022-06-23 RX ADMIN — ONDANSETRON 4 MG: 4 TABLET, ORALLY DISINTEGRATING ORAL at 18:34

## 2022-06-23 RX ADMIN — KETOROLAC TROMETHAMINE 15 MG: 30 INJECTION, SOLUTION INTRAMUSCULAR at 19:44
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
## 2022-08-22 ENCOUNTER — TELEPHONE (OUTPATIENT)
Dept: HEMATOLOGY ONCOLOGY | Facility: CLINIC | Age: 37
End: 2022-08-22

## 2022-08-22 NOTE — TELEPHONE ENCOUNTER
Called and LMOM advising patient her appointment for 8/23 has been cancelled as she did not show up for her CT scan    I encouraged her to call [phone] so her scan and follow up can be rescheduled/

## 2023-01-31 ENCOUNTER — HOSPITAL ENCOUNTER (EMERGENCY)
Facility: HOSPITAL | Age: 38
Discharge: HOME | End: 2023-01-31
Attending: EMERGENCY MEDICINE | Admitting: EMERGENCY MEDICINE

## 2023-01-31 ENCOUNTER — APPOINTMENT (EMERGENCY)
Dept: CT IMAGING | Facility: HOSPITAL | Age: 38
End: 2023-01-31

## 2023-01-31 VITALS
OXYGEN SATURATION: 98 % | HEART RATE: 95 BPM | TEMPERATURE: 98.2 F | RESPIRATION RATE: 16 BRPM | DIASTOLIC BLOOD PRESSURE: 77 MMHG | SYSTOLIC BLOOD PRESSURE: 112 MMHG

## 2023-01-31 DIAGNOSIS — R10.9 NONSPECIFIC ABDOMINAL PAIN: Primary | ICD-10-CM

## 2023-01-31 LAB
ALBUMIN SERPL BCP-MCNC: 3.4 G/DL (ref 3.5–5)
ALP SERPL-CCNC: 63 U/L (ref 46–116)
ALT SERPL W P-5'-P-CCNC: 15 U/L (ref 12–78)
ANION GAP SERPL CALCULATED.3IONS-SCNC: 6 MMOL/L (ref 4–13)
AST SERPL W P-5'-P-CCNC: 11 U/L (ref 5–45)
BASOPHILS # BLD AUTO: 0.06 THOUSANDS/ÂΜL (ref 0–0.1)
BASOPHILS NFR BLD AUTO: 1 % (ref 0–1)
BILIRUB DIRECT SERPL-MCNC: 0.04 MG/DL (ref 0–0.2)
BILIRUB SERPL-MCNC: 0.18 MG/DL (ref 0.2–1)
BUN SERPL-MCNC: 7 MG/DL (ref 5–25)
CALCIUM SERPL-MCNC: 8.7 MG/DL (ref 8.3–10.1)
CHLORIDE SERPL-SCNC: 105 MMOL/L (ref 96–108)
CK SERPL-CCNC: 57 U/L (ref 26–192)
CO2 SERPL-SCNC: 28 MMOL/L (ref 21–32)
CREAT SERPL-MCNC: 0.63 MG/DL (ref 0.6–1.3)
EOSINOPHIL # BLD AUTO: 0.29 THOUSAND/ÂΜL (ref 0–0.61)
EOSINOPHIL NFR BLD AUTO: 5 % (ref 0–6)
ERYTHROCYTE [DISTWIDTH] IN BLOOD BY AUTOMATED COUNT: 12.1 % (ref 11.6–15.1)
GFR SERPL CREATININE-BSD FRML MDRD: 114 ML/MIN/1.73SQ M
GLUCOSE SERPL-MCNC: 92 MG/DL (ref 65–140)
HCT VFR BLD AUTO: 36.2 % (ref 34.8–46.1)
HGB BLD-MCNC: 12 G/DL (ref 11.5–15.4)
IMM GRANULOCYTES # BLD AUTO: 0.02 THOUSAND/UL (ref 0–0.2)
IMM GRANULOCYTES NFR BLD AUTO: 0 % (ref 0–2)
LIPASE SERPL-CCNC: 616 U/L (ref 73–393)
LYMPHOCYTES # BLD AUTO: 2 THOUSANDS/ÂΜL (ref 0.6–4.47)
LYMPHOCYTES NFR BLD AUTO: 32 % (ref 14–44)
MCH RBC QN AUTO: 31.2 PG (ref 26.8–34.3)
MCHC RBC AUTO-ENTMCNC: 33.1 G/DL (ref 31.4–37.4)
MCV RBC AUTO: 94 FL (ref 82–98)
MONOCYTES # BLD AUTO: 0.37 THOUSAND/ÂΜL (ref 0.17–1.22)
MONOCYTES NFR BLD AUTO: 6 % (ref 4–12)
NEUTROPHILS # BLD AUTO: 3.5 THOUSANDS/ÂΜL (ref 1.85–7.62)
NEUTS SEG NFR BLD AUTO: 56 % (ref 43–75)
NRBC BLD AUTO-RTO: 0 /100 WBCS
PLATELET # BLD AUTO: 271 THOUSANDS/UL (ref 149–390)
PMV BLD AUTO: 10.2 FL (ref 8.9–12.7)
POTASSIUM SERPL-SCNC: 4.1 MMOL/L (ref 3.5–5.3)
PROT SERPL-MCNC: 6.8 G/DL (ref 6.4–8.4)
RBC # BLD AUTO: 3.85 MILLION/UL (ref 3.81–5.12)
SODIUM SERPL-SCNC: 139 MMOL/L (ref 135–147)
WBC # BLD AUTO: 6.24 THOUSAND/UL (ref 4.31–10.16)

## 2023-01-31 RX ADMIN — IOHEXOL 100 ML: 350 INJECTION, SOLUTION INTRAVENOUS at 13:46

## 2023-01-31 NOTE — ED PROVIDER NOTES
History  Chief Complaint   Patient presents with   • Leg Pain     Pt presents with c/o b/l thigh, pain began yesterday  States yesterday she had to hold her bowels because she wasn't able to get to the bathroom and she began to experience abd pain and leg pain  Since then, pain went away everywhere except her legs  Hx stage 2 colon cancer  HPI patient is a 24-year-old female reports that she works at [employer] and they are unwilling to give her breaks  Apparently she felt like she had to move her bowels yesterday and then developed abdominal pain because she was unable to go to the bathroom  She reports diffuse lower abdominal pain with associated leg soreness  Complains of bilateral soreness in her anterior legs primarily the region of her quadriceps  Patient has a history of stage II colon cancer and was concerned about metastasis or intra-abdominal pathology  She reports some back pain especially with movement  She reports pain with movement that radiates down into her anterior thighs  She reports soreness in both of her anterior thighs primarily the quadriceps muscles  Past medical history of stage II colon cancer status postresection, irritable bowel, depression  Family history noncontributory  Social history Works at [employer], former smoker    Prior to Admission Medications   Prescriptions Last Dose Informant Patient Reported? Taking?    Multiple Vitamins-Minerals (HAIR SKIN AND NAILS FORMULA PO)   Yes No   Sig: Take by mouth   acetaminophen (TYLENOL) 325 mg tablet   Yes No   Sig: Take 650 mg by mouth every 6 (six) hours as needed for mild pain   Patient not taking: Reported on 5/10/2022    albuterol (2 5 mg/3 mL) 0 083 % nebulizer solution   Yes No   Si 5 mg every 4 (four) hours as needed    Patient not taking: Reported on 5/10/2022    amitriptyline (ELAVIL) 150 MG tablet   Yes No   Sig: TAKE 1 TABLET BY MOUTH EVERY DAY AT NIGHT   amitriptyline (ELAVIL) 25 mg tablet   Yes No   Sig: Take 150 mg by mouth     famotidine (PEPCID) 20 mg tablet   No No   Sig: TAKE 1 TABLET BY MOUTH DAILY AT BEDTIME   Patient not taking: Reported on 5/10/2022   ibuprofen (MOTRIN) 800 mg tablet   Yes No   Patient not taking: Reported on 5/10/2022    metoclopramide (REGLAN) 5 mg tablet   No No   Sig: Take 1 tablet (5 mg total) by mouth 4 (four) times a day   Patient not taking: Reported on 5/10/2022    other medication, see sig,   Yes No   Sig: Medical Marijuana   pantoprazole (PROTONIX) 40 mg tablet   No No   Sig: Take 1 tablet (40 mg total) by mouth daily   Patient not taking: Reported on 5/10/2022       Facility-Administered Medications: None       Past Medical History:   Diagnosis Date   • Anxiety    • Asthma    • Breast cyst, left    • Cancer ([facility] [address] ) 2019    colon   • Depression    • Disease of thyroid gland     Pt hasn't seen physician regarding this since 2009   • Irritable bowel syndrome    • Uterine fibroid        Past Surgical History:   Procedure Laterality Date   • COLONOSCOPY     • CYSTOSCOPY N/A 10/21/2020    Procedure: Ruthann Ahuja;  Surgeon: Pepe Schmitt MD;  [facility];  Service: Gynecology   • HEMORRHOID SURGERY     • IR PICC PLACEMENT SINGLE LUMEN  10/30/2020   • WA LAPAROSCOPY COLECTOMY PARTIAL W/ANASTOMOSIS N/A 11/12/2019    Procedure: ROBOTIC ASSISTED ILEOCOLECTOMY;  Surgeon: Adry Alves MD;  [facility];  Service: Robotics   • WA LAPS TOTAL HYSTERECT 250 GM/< W/RMVL TUBE/OVARY Bilateral 10/21/2020    Procedure: ROBOTIC ASSISTED LAPAROSCOPIC HYSTERECTOMY BILATERAL SALPINGECTOMY;  Surgeon: Pepe Schmitt MD;  Location[facility];  Service: Gynecology       Family History   Problem Relation Age of Onset   • Thyroid cancer Mother    • Diabetes Father    • Heart disease Father    • Colon polyps Maternal Uncle    • Breast cancer Maternal Grandmother 52   • Prostate cancer Paternal Grandfather    • Pancreatic cancer Paternal Grandfather    • Colon polyps Paternal Aunt    • No Known Problems "Sister    • No Known Problems Brother    • No Known Problems Daughter    • No Known Problems Brother    • No Known Problems Daughter    • Colon cancer Neg Hx    • Ovarian cancer Neg Hx    • Uterine cancer Neg Hx    • Cervical cancer Neg Hx      I have reviewed and agree with the history as documented  E-Cigarette/Vaping   • E-Cigarette Use Current Every Day User    • Comments medical marijuana      E-Cigarette/Vaping Substances   • Nicotine No    • THC No    • CBD No    • Flavoring No    • Other No    • Unknown No      Social History     Tobacco Use   • Smoking status: Former     Types: Cigarettes     Quit date:      Years since quittin 0   • Smokeless tobacco: Never   Vaping Use   • Vaping Use: Every day   Substance Use Topics   • Alcohol use: Yes     Alcohol/week: 1 0 standard drink     Types: 1 Glasses of wine per week   • Drug use: Yes     Types: Marijuana     Comment: medical marijuana       Review of Systems   Constitutional: Negative for diaphoresis, fatigue and fever  HENT: Negative for congestion, ear pain, nosebleeds and sore throat  Eyes: Negative for photophobia, pain, discharge and visual disturbance  Respiratory: Negative for cough, choking, chest tightness, shortness of breath and wheezing  Cardiovascular: Negative for chest pain and palpitations  Gastrointestinal: Positive for abdominal pain  Negative for abdominal distention, diarrhea and vomiting  Genitourinary: Negative for dysuria, flank pain and frequency  Musculoskeletal: Negative for back pain, gait problem and joint swelling  Skin: Negative for color change and rash  Neurological: Negative for dizziness, syncope and headaches  Psychiatric/Behavioral: Negative for behavioral problems and confusion  The patient is not nervous/anxious  All other systems reviewed and are negative  Reports bilateral quadricep soreness    Physical Exam  Physical Exam  Vitals and nursing note reviewed     Constitutional:       " Appearance: She is well-developed  HENT:      Head: Normocephalic  Right Ear: External ear normal       Left Ear: External ear normal       Nose: Nose normal    Eyes:      General: Lids are normal       Pupils: Pupils are equal, round, and reactive to light  Cardiovascular:      Rate and Rhythm: Normal rate and regular rhythm  Pulses: Normal pulses  Heart sounds: Normal heart sounds  Pulmonary:      Effort: Pulmonary effort is normal  No respiratory distress  Breath sounds: Normal breath sounds  Abdominal:      General: Abdomen is flat  Bowel sounds are normal       Tenderness: There is abdominal tenderness  Comments: There is lower abdominal tenderness without rebound or guarding   Musculoskeletal:         General: No deformity  Normal range of motion  Cervical back: Normal range of motion and neck supple  Comments:  there is mild bilateral quadriceps tenderness, laterally within the muscle no medial tenderness no tenderness over Erji's canal, no calf tenderness patient reports her symptoms stop basically above her knees  Skin:     General: Skin is warm and dry  Comments: There is no redness there is no warmth there is no inflammation of the skin above the quadriceps  Neurological:      Mental Status: She is alert and oriented to person, place, and time     Psychiatric:         Mood and Affect: Mood normal      Pulse oximetry normal at 98% adequate oxygenation, there is no hypoxia    Vital Signs  ED Triage Vitals [01/31/23 1023]   Temperature Pulse Respirations Blood Pressure SpO2   98 2 °F (36 8 °C) (!) 115 22 122/71 98 %      Temp Source Heart Rate Source Patient Position - Orthostatic VS BP Location FiO2 (%)   Temporal Monitor Sitting Left arm --      Pain Score       --           Vitals:    01/31/23 1023 01/31/23 1314 01/31/23 1512   BP: 122/71 108/64 112/77   Pulse: (!) 115 83 95   Patient Position - Orthostatic VS: Sitting Sitting Lying         Visual Acuity      ED Medications  Medications   iohexol (OMNIPAQUE) 350 MG/ML injection (SINGLE-DOSE) 100 mL (100 mL Intravenous Given 1/31/23 1346)       Diagnostic Studies  Results Reviewed     Procedure Component Value Units Date/Time    Hepatic function panel [[id number]]  (Abnormal) Collected: 01/31/23 1310    Lab Status: Final result Specimen: Blood from Arm, Left Updated: 01/31/23 1352     Total Bilirubin 0 18 mg/dL      Bilirubin, Direct 0 04 mg/dL      Alkaline Phosphatase 63 U/L      AST 11 U/L      ALT 15 U/L      Total Protein 6 8 g/dL      Albumin 3 4 g/dL     Basic metabolic panel [[id number]] Collected: 01/31/23 1310    Lab Status: Final result Specimen: Blood from Arm, Left Updated: 01/31/23 1336     Sodium 139 mmol/L      Potassium 4 1 mmol/L      Chloride 105 mmol/L      CO2 28 mmol/L      ANION GAP 6 mmol/L      BUN 7 mg/dL      Creatinine 0 63 mg/dL      Glucose 92 mg/dL      Calcium 8 7 mg/dL      eGFR 114 ml/min/1 73sq m     Narrative:      [place] guidelines for Chronic Kidney Disease (CKD):   •  Stage 1 with normal or high GFR (GFR > 90 mL/min/1 73 square meters)  •  Stage 2 Mild CKD (GFR = 60-89 mL/min/1 73 square meters)  •  Stage 3A Moderate CKD (GFR = 45-59 mL/min/1 73 square meters)  •  Stage 3B Moderate CKD (GFR = 30-44 mL/min/1 73 square meters)  •  Stage 4 Severe CKD (GFR = 15-29 mL/min/1 73 square meters)  •  Stage 5 End Stage CKD (GFR <15 mL/min/1 73 square meters)  Note: GFR calculation is accurate only with a steady state creatinine    Lipase [[id number]]  (Abnormal) Collected: 01/31/23 1310    Lab Status: Final result Specimen: Blood from Arm, Left Updated: 01/31/23 1336     Lipase 616 u/L     CK [[id number]]  (Normal) Collected: 01/31/23 1310    Lab Status: Final result Specimen: Blood from Arm, Left Updated: 01/31/23 1333     Total CK 57 U/L     CBC and differential [[id number]] Collected: 01/31/23 1310    Lab Status: Final result Specimen: Blood from Arm, Left Updated: 01/31/23 1318     WBC 6 24 Thousand/uL      RBC 3 85 Million/uL      Hemoglobin 12 0 g/dL      Hematocrit 36 2 %      MCV 94 fL      MCH 31 2 pg      MCHC 33 1 g/dL      RDW 12 1 %      MPV 10 2 fL      Platelets 146 Thousands/uL      nRBC 0 /100 WBCs      Neutrophils Relative 56 %      Immat GRANS % 0 %      Lymphocytes Relative 32 %      Monocytes Relative 6 %      Eosinophils Relative 5 %      Basophils Relative 1 %      Neutrophils Absolute 3 50 Thousands/µL      Immature Grans Absolute 0 02 Thousand/uL      Lymphocytes Absolute 2 00 Thousands/µL      Monocytes Absolute 0 37 Thousand/µL      Eosinophils Absolute 0 29 Thousand/µL      Basophils Absolute 0 06 Thousands/µL                  CT abdomen pelvis with contrast   Final Result by Obie Evans MD (01/31 1451)      1  No acute findings in the abdomen or pelvis  2   Prior right hemicolectomy without evidence of recurrent or new metastatic disease in the abdomen or pelvis  3   Mildly enlarged mesenteric lymph nodes adjacent to 3rd portion of duodenum, unchanged from recent CT study, but decreased in size from October 2021  Workstation performed: [id number]         CT recon only lumbar spine (No Charge)   Final Result by Obie Evans MD (01/31 0123)      1  No acute or suspicious osseous abnormality  2  Left side foraminal/extraforaminal disc protrusion at L3-4 and probable small left foraminal disc protrusion at L4-5  Workstation performed: [id number]                    Procedures  Procedures         ED Course       Diagnostic testing: CT scan of the abdomen pelvis showed postsurgical changes no acute pathology, CT scan lumbar spine showed no metastasis there was some disc disease  Electrolytes were within normal limits normal BUN/creatinine no sign of renal dysfunction  Liver functions were normal no sign of hepatitis      Lipase was slightly elevated, discussed with patient, nonspecific finding there is no pancreatitis on CT discussed follow-up with gastroenterology  White count was normal 6 2 no sign of inflammation hemoglobin was normal at 12 no sign of anemia  Medical Decision Making  Nonspecific abdominal pain: acute illness or injury  Amount and/or Complexity of Data Reviewed  Labs: ordered  Radiology: ordered  Risk  Prescription drug management  Medical decision making 70-year-old female history of stage II colon cancer with resection in the past apparently developed abdominal pain and quadriceps tenderness and pain after holding a stool in during her working shift at [employer]  Patient has minimal lower abdominal tenderness now  CT showed no acute intra-abdominal pathology  No metastasis  There were no metastasis of the spine  Patient complained of some low back pain and had some numbness in her quadriceps but she had a normal CK there was no sign of muscle breakdown there is no focal weakness  She may have some referred pain from her back  Discussed with the patient presentation consistent with nonspecific abdominal pain possible low back pain  We discussed follow-up with her primary care provider we discussed indications to return  No indication for further agnostic testing here or hospitalization  Disposition  Final diagnoses:   Nonspecific abdominal pain     Time reflects when diagnosis was documented in both MDM as applicable and the Disposition within this note     Time User Action Codes Description Comment    1/31/2023  3:31 PM Adolfo Connie Add [R10 9] Nonspecific abdominal pain       ED Disposition     ED Disposition   Discharge    Condition   Stable    Date/Time   Tue Jan 31, 2023  3:31 PM    Comment   Octaviano Milligan discharge to home/self care                 Follow-up Information     Follow up With Specialties Details Why [address], [place] Family Medicine   [address]  [facility]  [address] [address]  [phone]            Discharge Medication List as of 1/31/2023  3:32 PM      CONTINUE these medications which have NOT CHANGED    Details   acetaminophen (TYLENOL) 325 mg tablet Take 650 mg by mouth every 6 (six) hours as needed for mild pain, Historical Med      albuterol (2 5 mg/3 mL) 0 083 % nebulizer solution 2 5 mg every 4 (four) hours as needed , Starting Tue 3/13/2018, Historical Med      amitriptyline (ELAVIL) 150 MG tablet TAKE 1 TABLET BY MOUTH EVERY DAY AT NIGHT, Historical Med      famotidine (PEPCID) 20 mg tablet TAKE 1 TABLET BY MOUTH DAILY AT BEDTIME, Normal      ibuprofen (MOTRIN) 800 mg tablet Starting Thu 12/9/2021, Historical Med      metoclopramide (REGLAN) 5 mg tablet Take 1 tablet (5 mg total) by mouth 4 (four) times a day, Starting Mon 10/18/2021, Normal      Multiple Vitamins-Minerals (HAIR SKIN AND NAILS FORMULA PO) Take by mouth, Historical Med      other medication, see sig, Medical Marijuana, Historical Med      pantoprazole (PROTONIX) 40 mg tablet Take 1 tablet (40 mg total) by mouth daily, Starting Sat 10/2/2021, Normal             No discharge procedures on file      PDMP Review       Value Time User    PDMP Reviewed  Yes 10/21/2020  3:11 PM Rola Lagos MD          ED Provider  Electronically Signed by           Ja Field MD  01/31/23 6758

## 2023-01-31 NOTE — DISCHARGE INSTRUCTIONS
Normal care  Rest  Follow-up with your primary care provider for further evaluation or further imaging  Return increasing pain worsening symptoms or any problems

## 2023-02-15 ENCOUNTER — TELEPHONE (OUTPATIENT)
Dept: OBGYN CLINIC | Facility: CLINIC | Age: 38
End: 2023-02-15

## 2023-02-16 RX ORDER — METHOCARBAMOL 500 MG/1
TABLET, FILM COATED ORAL
COMMUNITY
Start: 2023-01-09

## 2023-02-20 ENCOUNTER — OFFICE VISIT (OUTPATIENT)
Dept: GASTROENTEROLOGY | Facility: CLINIC | Age: 38
End: 2023-02-20

## 2023-02-20 VITALS
WEIGHT: 132 LBS | DIASTOLIC BLOOD PRESSURE: 72 MMHG | SYSTOLIC BLOOD PRESSURE: 90 MMHG | BODY MASS INDEX: 24.29 KG/M2 | HEIGHT: 62 IN | HEART RATE: 90 BPM

## 2023-02-20 DIAGNOSIS — Z85.038 PERSONAL HISTORY OF COLON CANCER, STAGE II: Primary | ICD-10-CM

## 2023-02-20 DIAGNOSIS — K21.9 GASTROESOPHAGEAL REFLUX DISEASE WITHOUT ESOPHAGITIS: ICD-10-CM

## 2023-02-20 DIAGNOSIS — K58.0 IRRITABLE BOWEL SYNDROME WITH DIARRHEA: ICD-10-CM

## 2023-02-20 RX ORDER — PANTOPRAZOLE SODIUM 40 MG/1
40 TABLET, DELAYED RELEASE ORAL DAILY
Qty: 30 TABLET | Refills: 6 | Status: SHIPPED | OUTPATIENT
Start: 2023-02-20

## 2023-02-20 RX ORDER — DICYCLOMINE HCL 20 MG
20 TABLET ORAL EVERY 6 HOURS PRN
Qty: 45 TABLET | Refills: 3 | Status: SHIPPED | OUTPATIENT
Start: 2023-02-20

## 2023-02-20 NOTE — PATIENT INSTRUCTIONS
Scheduled date of colonoscopy (as of today):5/6/23  Physician performing colonoscopy:Jaguar  Location of colonoscopy:[place]  Bowel prep reviewed with patient:Cristina/Miralax  Instructions reviewed with patient by:Dejan lam  Clearances:  none

## 2023-02-20 NOTE — PROGRESS NOTES
[facility] - Outpatient Follow-up Note  Sheri Damon 45 y o  female MRN: [id number]  Encounter: [id number]          ASSESSMENT AND PLAN:      1  Personal history of colon cancer, stage II  2  Gastroesophageal reflux disease without esophagitis  3  Irritable bowel syndrome with diarrhea  She has ongoing nausea, heartburn, abdominal pain and irregular bowel movements  Many of her symptoms are being triggered by poor working conditions and stress  Recent CT and labs were unremarkable except for an elevated lipase - will repeat the level  Dicyclomine prn pain  Pantoprazole daily  Zofran PRN nausea  Will update her colonoscopy - last exam in 2021 with an adenomatous polyp removed  Colon cancer was diagnosed in 2019    ______________________________________________________________________    SUBJECTIVE: 28-year-old female with a history of colon cancer diagnosed in 2019 as well as GERD and irritable bowel syndrome who presents for routine follow-up  She continues to report abdominal pain, irregular bowel movements and heartburn  She has nausea with occasional vomiting  She is working at [employer] [employer] and describes her working conditions as poor  There is no heat in the building which she reports is bringing on many of her symptoms  She was recently evaluated in the emergency room where she had a CT scan and labs  She had an elevated lipase but otherwise work-up was unremarkable  She denies any recent rectal bleeding but has noted dark stools  Her weight is stable      REVIEW OF SYSTEMS IS OTHERWISE NEGATIVE        Historical Information   Past Medical History:   Diagnosis Date   • Anxiety    • Asthma    • Breast cyst, left    • Cancer ([facility] [address] ) 2019    colon   • Depression    • Disease of thyroid gland     Pt hasn't seen physician regarding this since 2009   • Irritable bowel syndrome    • Uterine fibroid      Past Surgical History:   Procedure Laterality Date   • COLONOSCOPY     • CYSTOSCOPY N/A 10/21/2020    Procedure: CYSTOSCOPY;  Surgeon: Gail Abdullahi MD;  Location: [facility];  Service: Gynecology   • HEMORRHOID SURGERY     • IR PICC PLACEMENT SINGLE LUMEN  10/30/2020   • [place] LAPAROSCOPY COLECTOMY PARTIAL W/ANASTOMOSIS N/A 2019    Procedure: ROBOTIC ASSISTED ILEOCOLECTOMY;  Surgeon: Kendrick Ahumada, MD;  [facility]: [facility];  Service: Robotics   • [place] LAPS TOTAL HYSTERECT 250 GM/< W/RMVL TUBE/OVARY Bilateral 10/21/2020    Procedure: ROBOTIC ASSISTED LAPAROSCOPIC HYSTERECTOMY BILATERAL SALPINGECTOMY;  Surgeon: Gail Abdullahi MD;  Location: [facility];  Service: Gynecology     Social History   Social History     Substance and Sexual Activity   Alcohol Use Yes   • Alcohol/week: 1 0 standard drink   • Types: 1 Glasses of wine per week     Social History     Substance and Sexual Activity   Drug Use Yes   • Types: Marijuana    Comment: medical marijuana     Social History     Tobacco Use   Smoking Status Former   • Types: Cigarettes   • Quit date:    • Years since quittin 1   Smokeless Tobacco Never     Family History   Problem Relation Age of Onset   • Thyroid cancer Mother    • Diabetes Father    • Heart disease Father    • Colon polyps Maternal Uncle    • Breast cancer Maternal Grandmother 52   • Prostate cancer Paternal Grandfather    • Pancreatic cancer Paternal Grandfather    • Colon polyps Paternal Aunt    • No Known Problems Sister    • No Known Problems Brother    • No Known Problems Daughter    • No Known Problems Brother    • No Known Problems Daughter    • Colon cancer Neg Hx    • Ovarian cancer Neg Hx    • Uterine cancer Neg Hx    • Cervical cancer Neg Hx        Meds/Allergies       Current Outpatient Medications:   •  amitriptyline (ELAVIL) 150 MG tablet  •  dicyclomine (BENTYL) 20 mg tablet  •  methocarbamol (ROBAXIN) 500 mg tablet  •  Multiple Vitamins-Minerals (HAIR SKIN AND NAILS FORMULA PO)  •  other medication, see sig,  •  pantoprazole (PROTONIX) 40 mg tablet  • amitriptyline (ELAVIL) 25 mg tablet    Allergies   Allergen Reactions   • Nuts - Food Allergy      GI distress   • Peanut-Containing Drug Products - Food Allergy      GI distress           Objective     Blood pressure 90/72, pulse 90, height 5' 2" (1 575 m), weight 59 9 kg (132 lb), last menstrual period 09/04/2020, not currently breastfeeding  Body mass index is 24 14 kg/m²  PHYSICAL EXAM:      General Appearance:   Alert, cooperative, no distress   HEENT:   Normocephalic, atraumatic, anicteric      Neck:  Supple, symmetrical, trachea midline   Lungs:   Clear to auscultation bilaterally; no rales, rhonchi or wheezing; respirations unlabored    Heart[de-identified]   Regular rate and rhythm; no murmur, rub, or gallop  Abdomen:   Soft, non-tender, non-distended; normal bowel sounds; no masses, no organomegaly    Genitalia:   Deferred    Rectal:   Deferred    Extremities:  No cyanosis, clubbing or edema    Pulses:  2+ and symmetric    Skin:  No jaundice, rashes, or lesions    Lymph nodes:  No palpable cervical lymphadenopathy        Lab Results:   No visits with results within 1 Day(s) from this visit     Latest known visit with results is:   Admission on 01/31/2023, Discharged on 01/31/2023   Component Date Value   • WBC 01/31/2023 6 24    • RBC 01/31/2023 3 85    • Hemoglobin 01/31/2023 12 0    • Hematocrit 01/31/2023 36 2    • MCV 01/31/2023 94    • MCH 01/31/2023 31 2    • MCHC 01/31/2023 33 1    • RDW 01/31/2023 12 1    • MPV 01/31/2023 10 2    • Platelets 29/01/1540 271    • nRBC 01/31/2023 0    • Neutrophils Relative 01/31/2023 56    • Immat GRANS % 01/31/2023 0    • Lymphocytes Relative 01/31/2023 32    • Monocytes Relative 01/31/2023 6    • Eosinophils Relative 01/31/2023 5    • Basophils Relative 01/31/2023 1    • Neutrophils Absolute 01/31/2023 3 50    • Immature Grans Absolute 01/31/2023 0 02    • Lymphocytes Absolute 01/31/2023 2 00    • Monocytes Absolute 01/31/2023 0 37    • Eosinophils Absolute 01/31/2023 0 29    • Basophils Absolute 01/31/2023 0 06    • Sodium 01/31/2023 139    • Potassium 01/31/2023 4 1    • Chloride 01/31/2023 105    • CO2 01/31/2023 28    • ANION GAP 01/31/2023 6    • BUN 01/31/2023 7    • Creatinine 01/31/2023 0 63    • Glucose 01/31/2023 92    • Calcium 01/31/2023 8 7    • eGFR 01/31/2023 114    • Total Bilirubin 01/31/2023 0 18 (L)    • Bilirubin, Direct 01/31/2023 0 04    • Alkaline Phosphatase 01/31/2023 63    • AST 01/31/2023 11    • ALT 01/31/2023 15    • Total Protein 01/31/2023 6 8    • Albumin 01/31/2023 3 4 (L)    • Lipase 01/31/2023 616 (H)    • Total CK 01/31/2023 57          Radiology Results:   CT recon only lumbar spine (No Charge)    Result Date: 1/31/2023  Narrative: CT RECON ONLY LUMBAR SPINE (NO CHARGE) INDICATION:   pain Neck pain rule out spinal metastasis  COMPARISON:  None TECHNIQUE: Axial CT examination of the lumbar spine was obtained utilizing reconstructed images from CT of the chest, abdomen and pelvis performed the same day  Images were reformatted in the sagittal and coronal planes  This examination, like all CT scans performed in the [facility], was performed utilizing techniques to minimize radiation dose exposure, including the use of iterative reconstruction and automated exposure control  FINDINGS: ALIGNMENT: Normal alignment  No spondylolisthesis  No scoliosis  VERTEBRAE: No fracture  No acute osseous abnormality  DEGENERATIVE CHANGES: Left side foraminal/extraforaminal disc protrusion at L3-4 (8/49, 60 oh 4/37) and probable small left foraminal disc protrusion at L4-5  (8/61)  Mild inferior endplate degenerative changes of L3  Intervertebral disc space heights are well-maintained  PREVERTEBRAL AND PARASPINAL SOFT TISSUES: Normal      Impression: 1  No acute or suspicious osseous abnormality  2  Left side foraminal/extraforaminal disc protrusion at L3-4 and probable small left foraminal disc protrusion at L4-5   Workstation performed: [id number]     CT abdomen pelvis with contrast    Result Date: 1/31/2023  Narrative: CT ABDOMEN AND PELVIS WITH IV CONTRAST INDICATION:   Abdominal pain, acute, nonlocalized Abdominal pain history of colon cancer, rule out metastasis,  COMPARISON:  CT chest abdomen and pelvis dated January 26, 2022 and October 12, 2021  TECHNIQUE:  CT examination of the abdomen and pelvis was performed  Axial, sagittal, and coronal 2D reformatted images were created from the source data and submitted for interpretation  Radiation dose length product (DLP) for this visit:  784 mGy-cm   This examination, like all CT scans performed in the [facility], was performed utilizing techniques to minimize radiation dose exposure, including the use of iterative reconstruction and automated exposure control  IV Contrast:  100 mL of iohexol (OMNIPAQUE) Enteric Contrast:  Enteric contrast was not administered  FINDINGS: ABDOMEN LOWER CHEST:  No clinically significant abnormality identified in the visualized lower chest  LIVER/BILIARY TREE:  Unremarkable  GALLBLADDER:  No calcified gallstones  No pericholecystic inflammatory change  SPLEEN:  Unremarkable  PANCREAS:  Unremarkable  ADRENAL GLANDS:  Unremarkable  KIDNEYS/URETERS:  Unremarkable  No hydronephrosis  STOMACH AND BOWEL:  Stable postsurgical changes from right hemicolectomy and ileotransverse anastomosis  No focal mass is seen to suggest locally recurrent tumor  No bowel obstruction  No acute inflammatory changes  APPENDIX:  Status post right hemicolectomy  ABDOMINOPELVIC CAVITY:  No ascites  No pneumoperitoneum  Mildly enlarged mesenteric lymph nodes adjacent to 3rd portion of duodenum measuring 1 0 x 1 0 cm and 0 9 x 0 6 cm (2/76), unchanged from most recent CT, but decreased in size from October 2021 at which time they measured 1 3 x 1 2 cm and 1 5 x 0 9 cm when measured similarly  No additional enlarged lymph nodes  VESSELS:  Unremarkable for patient's age  PELVIS REPRODUCTIVE ORGANS:  Prior hysterectomy  No suspicious pelvic mass  High position of right ovary in the right lower quadrant with a 2 1 cm corpus luteum which demonstrates crenulated hyperenhancing wall (2/104)  URINARY BLADDER:  Unremarkable  ABDOMINAL WALL/INGUINAL REGIONS:  Unremarkable  OSSEOUS STRUCTURES:  No acute fracture or destructive osseous lesion  Impression: 1  No acute findings in the abdomen or pelvis  2   Prior right hemicolectomy without evidence of recurrent or new metastatic disease in the abdomen or pelvis  3   Mildly enlarged mesenteric lymph nodes adjacent to 3rd portion of duodenum, unchanged from recent CT study, but decreased in size from October 2021  Workstation performed: [id number]   Answers for HPI/ROS submitted by the patient on 2/20/2023  Chronicity: chronic  Onset: 1 to 4 weeks ago  Onset quality: gradual  Frequency: constantly  Episode duration: 3 Hours  Progression since onset: rapidly worsening  Pain location: LLQ  Pain - numeric: 5/10  Pain quality: cramping  Radiates to: periumbilical region, suprapubic region, back, perineum  anorexia: No  arthralgias: Yes  belching: Yes  constipation: No  diarrhea: Yes  dysuria: No  fever: No  flatus: No  frequency: Yes  headaches: Yes  hematochezia: No  hematuria: No  melena:  Yes  myalgias: Yes  nausea: Yes  weight loss: No  vomiting: Yes  Aggravated by: being still, certain positions, coughing, vomiting  Relieved by: recumbency  Diagnostic workup: CT scan

## 2023-03-06 ENCOUNTER — TELEPHONE (OUTPATIENT)
Dept: OTHER | Facility: OTHER | Age: 38
End: 2023-03-06

## 2023-03-06 NOTE — TELEPHONE ENCOUNTER
Left message for patient that we do not have forms from employer, checked w/all MA's and , gave fax number of [phone] to have forms re-faxed

## 2023-03-06 NOTE — TELEPHONE ENCOUNTER
Patient stated that a 3rd party with her employer was suppose to fax forms over to office to be filled out  Patient is requesting status of completiion  Please call back to address

## 2023-03-07 NOTE — TELEPHONE ENCOUNTER
rcvd fax of forms, given to Varinder Erwin is not in the office)  Routing to PA  Can you fill out or should we wait until Gómez Saldaña returns?   Thank you

## 2023-03-15 ENCOUNTER — APPOINTMENT (EMERGENCY)
Dept: CT IMAGING | Facility: HOSPITAL | Age: 38
End: 2023-03-15

## 2023-03-15 ENCOUNTER — HOSPITAL ENCOUNTER (EMERGENCY)
Facility: HOSPITAL | Age: 38
Discharge: HOME | End: 2023-03-16
Attending: EMERGENCY MEDICINE

## 2023-03-15 DIAGNOSIS — K62.89 ANAL OR RECTAL PAIN: Primary | ICD-10-CM

## 2023-03-15 LAB
ALBUMIN SERPL BCP-MCNC: 4.1 G/DL (ref 3.5–5)
ALP SERPL-CCNC: 52 U/L (ref 34–104)
ALT SERPL W P-5'-P-CCNC: 12 U/L (ref 7–52)
ANION GAP SERPL CALCULATED.3IONS-SCNC: 8 MMOL/L (ref 4–13)
AST SERPL W P-5'-P-CCNC: 38 U/L (ref 13–39)
BASOPHILS # BLD AUTO: 0.07 THOUSANDS/ÂΜL (ref 0–0.1)
BASOPHILS NFR BLD AUTO: 1 % (ref 0–1)
BILIRUB SERPL-MCNC: 0.25 MG/DL (ref 0.2–1)
BUN SERPL-MCNC: 6 MG/DL (ref 5–25)
CALCIUM SERPL-MCNC: 9.1 MG/DL (ref 8.4–10.2)
CHLORIDE SERPL-SCNC: 105 MMOL/L (ref 96–108)
CO2 SERPL-SCNC: 24 MMOL/L (ref 21–32)
CREAT SERPL-MCNC: 0.66 MG/DL (ref 0.6–1.3)
EOSINOPHIL # BLD AUTO: 0.24 THOUSAND/ÂΜL (ref 0–0.61)
EOSINOPHIL NFR BLD AUTO: 3 % (ref 0–6)
ERYTHROCYTE [DISTWIDTH] IN BLOOD BY AUTOMATED COUNT: 12 % (ref 11.6–15.1)
GFR SERPL CREATININE-BSD FRML MDRD: 112 ML/MIN/1.73SQ M
GLUCOSE SERPL-MCNC: 101 MG/DL (ref 65–140)
HCT VFR BLD AUTO: 35.3 % (ref 34.8–46.1)
HGB BLD-MCNC: 12 G/DL (ref 11.5–15.4)
IMM GRANULOCYTES # BLD AUTO: 0.03 THOUSAND/UL (ref 0–0.2)
IMM GRANULOCYTES NFR BLD AUTO: 0 % (ref 0–2)
LYMPHOCYTES # BLD AUTO: 2.27 THOUSANDS/ÂΜL (ref 0.6–4.47)
LYMPHOCYTES NFR BLD AUTO: 29 % (ref 14–44)
MCH RBC QN AUTO: 31 PG (ref 26.8–34.3)
MCHC RBC AUTO-ENTMCNC: 34 G/DL (ref 31.4–37.4)
MCV RBC AUTO: 91 FL (ref 82–98)
MONOCYTES # BLD AUTO: 0.43 THOUSAND/ÂΜL (ref 0.17–1.22)
MONOCYTES NFR BLD AUTO: 5 % (ref 4–12)
NEUTROPHILS # BLD AUTO: 4.92 THOUSANDS/ÂΜL (ref 1.85–7.62)
NEUTS SEG NFR BLD AUTO: 62 % (ref 43–75)
NRBC BLD AUTO-RTO: 0 /100 WBCS
PLATELET # BLD AUTO: 324 THOUSANDS/UL (ref 149–390)
PMV BLD AUTO: 10.1 FL (ref 8.9–12.7)
POTASSIUM SERPL-SCNC: 3.5 MMOL/L (ref 3.5–5.3)
PROT SERPL-MCNC: 7.2 G/DL (ref 6.4–8.4)
RBC # BLD AUTO: 3.87 MILLION/UL (ref 3.81–5.12)
SODIUM SERPL-SCNC: 137 MMOL/L (ref 135–147)
WBC # BLD AUTO: 7.96 THOUSAND/UL (ref 4.31–10.16)

## 2023-03-15 RX ADMIN — IOHEXOL 100 ML: 350 INJECTION, SOLUTION INTRAVENOUS at 22:43

## 2023-03-16 VITALS
TEMPERATURE: 98.2 F | RESPIRATION RATE: 20 BRPM | HEART RATE: 86 BPM | OXYGEN SATURATION: 97 % | SYSTOLIC BLOOD PRESSURE: 115 MMHG | DIASTOLIC BLOOD PRESSURE: 75 MMHG

## 2023-03-16 NOTE — ED PROVIDER NOTES
History  Chief Complaint   Patient presents with   • Medical Problem     Pt arrives c/o feeling an "anal lump " Pt states inability to pass solid stool due to obstructing lump  Denies pain and bleeding  Reports mild abdominal pain and nausea     31-year-old female with a history of colon cancer presents with a chief complaint of "a lump "  Patient notes ongoing problems with defecation for some time, stating "this is a problem I have had for a very long time, I want to say years "  Today however when she attempted to have a bowel movement she states "when I touch down there, there is a lump "  Patient has difficulty further describing the problem though she states it is "inside the whole area" but denies it being inside of her rectum  Patient notes diarrhea this morning that she states is fairly common for her as she has a history of IBS    Patient notes ongoing episodes of abdominal pain but denies any abdominal pain today, states she has been taking her medication as prescribed by gastroenterology  Patient denies any nausea or vomiting today though she notes she does have recurrent episodes  Patient denies vaginal bleeding or discharge  Patient has a hysterectomy  Patient denies any fever or chills  Focused Objective Exam:  Abdomen:  Inspection of an abdomen with previous abdominal surgical incisions noted without erythema, rashes or ecchymosis noted  No abdominal pulsations noted  Normal bowel sounds with no bruit auscultated  Soft abdomen  Palpitation noted no tenderness to palpitation; tenderness not over McBurney's point  No masses or pulsatile aorta noted on examination  No rebound or guarding noted on examination, non-peritoneal exam     Back:  No rash or signs of herpes zoster  No CVA tenderness noted  Skin:  No ecchymosis of the umbilicus (negative Jeremiah's sign) or flank (negative Grey Khan's sign)  Warm and dry      Medical Decision Making    Rectal fullness with a broad differential   On clinical examination patient points to a small area at the 3 o'clock position of the rectum that is tender to palpitation  There is some likely redundant tissue in this region, less likely an abscess though bedside ultrasound was obtained that demonstrated no clear hypoechoic area that would be convincing   There is no stool in the rectal vault that would be concerning for impaction  No masses that would be concerning for thrombosed hemorrhoids  Patient has a history of colon cancer and multiple prior surgiers which increases the evaluation of the complexity of their presenting complaint  Review of the medical record including higher gastroenterology notes  Differential is broad and includes significant likelihood of intra-abdominal pathology, including concerns for potential perirectal or perianal abscess  Will obtain CBC to evaluate for anemia and leukocytosis  Will obtain CMP to evaluate electrolytes, renal, biliary, and hepatic function  Will obtain lipase to evaluate for potential pancreatitis  Based on patient's age, history, physical exam and presenting complaint, will obtain contrast enhanced CT imaging of patient's abdomen and pelvis to further evaluate for possible intraabdominal pathology including perirectal or perianal abscess  Considering patient's complex history, I did discuss the need for continued follow up; notably patient has repeat colonoscopy scheduled with GI in May  Prior to Admission Medications   Prescriptions Last Dose Informant Patient Reported? Taking?    Multiple Vitamins-Minerals (HAIR SKIN AND NAILS FORMULA PO)   Yes No   Sig: Take by mouth   amitriptyline (ELAVIL) 150 MG tablet   Yes No   Sig: TAKE 1 TABLET BY MOUTH EVERY DAY AT NIGHT   amitriptyline (ELAVIL) 25 mg tablet   Yes No   Sig: Take 150 mg by mouth     dicyclomine (BENTYL) 20 mg tablet   No No   Sig: Take 1 tablet (20 mg total) by mouth every 6 (six) hours as needed (abdominal pain)   methocarbamol (ROBAXIN) 500 mg tablet   Yes No   Sig: TAKE 1 TABLET BY MOUTH 3 TIMES A DAY AS NEEDED FOR MUSCLE SPASMS     other medication, see sig,   Yes No   Sig: Medical Marijuana   pantoprazole (PROTONIX) 40 mg tablet   No No   Sig: Take 1 tablet (40 mg total) by mouth daily      Facility-Administered Medications: None       Past Medical History:   Diagnosis Date   • Anxiety    • Asthma    • Breast cyst, left    • Cancer ([address] ) 2019    colon   • Depression    • Disease of thyroid gland     Pt hasn't seen physician regarding this since 2009   • Irritable bowel syndrome    • Uterine fibroid        Past Surgical History:   Procedure Laterality Date   • COLONOSCOPY     • CYSTOSCOPY N/A 10/21/2020    Procedure: Bibi Clarke;  Surgeon: Bernadette Kahn MD;  [facility];  Service: Gynecology   • HEMORRHOID SURGERY     • IR PICC PLACEMENT SINGLE LUMEN  10/30/2020   • LA LAPAROSCOPY COLECTOMY PARTIAL W/ANASTOMOSIS N/A 11/12/2019    Procedure: ROBOTIC ASSISTED ILEOCOLECTOMY;  Surgeon: Haris Orozco MD;  [facility];  Service: Robotics   • LA LAPS TOTAL HYSTERECT 250 GM/< W/RMVL TUBE/OVARY Bilateral 10/21/2020    Procedure: ROBOTIC ASSISTED LAPAROSCOPIC HYSTERECTOMY BILATERAL SALPINGECTOMY;  Surgeon: Bernadette Kahn MD;  [facility];  Service: Gynecology       Family History   Problem Relation Age of Onset   • Thyroid cancer Mother    • Diabetes Father    • Heart disease Father    • Colon polyps Maternal Uncle    • Breast cancer Maternal Grandmother 52   • Prostate cancer Paternal Grandfather    • Pancreatic cancer Paternal Grandfather    • Colon polyps Paternal Aunt    • No Known Problems Sister    • No Known Problems Brother    • No Known Problems Daughter    • No Known Problems Brother    • No Known Problems Daughter    • Colon cancer Neg Hx    • Ovarian cancer Neg Hx    • Uterine cancer Neg Hx    • Cervical cancer Neg Hx      I have reviewed and agree with the history as documented  E-Cigarette/Vaping   • E-Cigarette Use Current Every Day User    • Comments medical marijuana      E-Cigarette/Vaping Substances   • Nicotine No    • THC No    • CBD No    • Flavoring No    • Other No    • Unknown No      Social History     Tobacco Use   • Smoking status: Former     Types: Cigarettes     Quit date: 2017     Years since quittin 2   • Smokeless tobacco: Never   Vaping Use   • Vaping Use: Every day   Substance Use Topics   • Alcohol use: Yes     Alcohol/week: 1 0 standard drink     Types: 1 Glasses of wine per week   • Drug use: Yes     Types: Marijuana     Comment: medical marijuana       Review of Systems    Physical Exam  Physical Exam  Exam conducted with a chaperone present Toña Sanchez)  Genitourinary:     Rectum: Tenderness present  No internal hemorrhoid  Comments: There is a small area on the right border of the rectum in the area of 3 o'clock position  There is no erythema  There is no fluctuance  There is some tenderness in this region          Vital Signs  ED Triage Vitals   Temperature Pulse Respirations Blood Pressure SpO2   03/15/23 2134 03/15/23 2134 03/15/23 2134 03/15/23 2134 03/15/23 2134   98 2 °F (36 8 °C) 99 20 137/89 99 %      Temp Source Heart Rate Source Patient Position - Orthostatic VS BP Location FiO2 (%)   03/15/23 2134 03/15/23 2134 03/15/23 2134 03/15/23 2134 --   Oral Monitor Lying Right arm       Pain Score       03/15/23 2300       3           Vitals:    03/15/23 2134 03/15/23 2300 23 0000   BP: 137/89 118/70 115/75   Pulse: 99 84 86   Patient Position - Orthostatic VS: Lying Lying Sitting         Visual Acuity      ED Medications  Medications   iohexol (OMNIPAQUE) 350 MG/ML injection (SINGLE-DOSE) 100 mL (100 mL Intravenous Given 3/15/23 2243)       Diagnostic Studies  Results Reviewed     Procedure Component Value Units Date/Time    Comprehensive metabolic panel [[id number]] Collected: 03/15/23 2209    Lab Status: Final result Specimen: Blood from Arm, Left Updated: 03/15/23 2232     Sodium 137 mmol/L      Potassium 3 5 mmol/L      Chloride 105 mmol/L      CO2 24 mmol/L      ANION GAP 8 mmol/L      BUN 6 mg/dL      Creatinine 0 66 mg/dL      Glucose 101 mg/dL      Calcium 9 1 mg/dL      AST 38 U/L      ALT 12 U/L      Alkaline Phosphatase 52 U/L      Total Protein 7 2 g/dL      Albumin 4 1 g/dL      Total Bilirubin 0 25 mg/dL      eGFR 112 ml/min/1 73sq m     Narrative:      National Kidney Disease Foundation guidelines for Chronic Kidney Disease (CKD):   •  Stage 1 with normal or high GFR (GFR > 90 mL/min/1 73 square meters)  •  Stage 2 Mild CKD (GFR = 60-89 mL/min/1 73 square meters)  •  Stage 3A Moderate CKD (GFR = 45-59 mL/min/1 73 square meters)  •  Stage 3B Moderate CKD (GFR = 30-44 mL/min/1 73 square meters)  •  Stage 4 Severe CKD (GFR = 15-29 mL/min/1 73 square meters)  •  Stage 5 End Stage CKD (GFR <15 mL/min/1 73 square meters)  Note: GFR calculation is accurate only with a steady state creatinine    CBC and differential [[id number]] Collected: 03/15/23 2209    Lab Status: Final result Specimen: Blood from Arm, Left Updated: 03/15/23 2215     WBC 7 96 Thousand/uL      RBC 3 87 Million/uL      Hemoglobin 12 0 g/dL      Hematocrit 35 3 %      MCV 91 fL      MCH 31 0 pg      MCHC 34 0 g/dL      RDW 12 0 %      MPV 10 1 fL      Platelets 498 Thousands/uL      nRBC 0 /100 WBCs      Neutrophils Relative 62 %      Immat GRANS % 0 %      Lymphocytes Relative 29 %      Monocytes Relative 5 %      Eosinophils Relative 3 %      Basophils Relative 1 %      Neutrophils Absolute 4 92 Thousands/µL      Immature Grans Absolute 0 03 Thousand/uL      Lymphocytes Absolute 2 27 Thousands/µL      Monocytes Absolute 0 43 Thousand/µL      Eosinophils Absolute 0 24 Thousand/µL      Basophils Absolute 0 07 Thousands/µL                  CT abdomen pelvis with contrast   Final Result by Jenny Montero MD (03/15 5966)      No acute pathology visualized on CT of the abdomen and pelvis with IV without oral contrast       Recommend GI consultation for further evaluation  Workstation performed: [id number]                    Procedures  Procedures         ED Course  ED Course as of 03/19/23 2106   Thu Mar 16, 2023   0016 Patient's CT without acute findings, no abscess identified  Discussed with the patient diagnostic uncertainty  Discussed contacting GI tomorrow to discuss continued workup and evaluation  Discussed return precautions in detail  SBIRT 22yo+    Flowsheet Row Most Recent Value   SBIRT (25 yo +)    In order to provide better care to our patients, we are screening all of our patients for alcohol and drug use  Would it be okay to ask you these screening questions? Yes Filed at: 03/15/2023 2137   Initial Alcohol Screen: US AUDIT-C     1  How often do you have a drink containing alcohol? 0 Filed at: 03/15/2023 2137   2  How many drinks containing alcohol do you have on a typical day you are drinking? 0 Filed at: 03/15/2023 2137   3a  Male UNDER 65: How often do you have five or more drinks on one occasion? 0 Filed at: 03/15/2023 2137   3b  FEMALE Any Age, or MALE 65+: How often do you have 4 or more drinks on one occassion? 0 Filed at: 03/15/2023 2137   Audit-C Score 0 Filed at: 03/15/2023 2137   MARICARMEN: How many times in the past year have you    Used an illegal drug or used a prescription medication for non-medical reasons? Never Filed at: 03/15/2023 2137                    MDM    Disposition  Final diagnoses:   Anal or rectal pain     Time reflects when diagnosis was documented in both MDM as applicable and the Disposition within this note     Time User Action Codes Description Comment    3/16/2023 12:08 AM Rafa Ceja Add [K62 89] Anal or rectal pain       ED Disposition     ED Disposition   Discharge    Condition   Stable    Date/Time   Thu Mar 16, 2023 12:08 AM    Comment   Elizabeth Youngblood discharge to home/self care  Follow-up Information     Follow up With Specialties Details Why Contact Info Additional [facility] Call today To discuss whether they can establish closer follow-up and continued evaluation  [address] [facility] [place], [address], [place], [place], [place], [address] [facility] Emergency Department Emergency Medicine Go to  If symptoms worsen [address] Emergency Department, [address] [place] [address], [address]          Discharge Medication List as of 3/16/2023 12:09 AM      CONTINUE these medications which have NOT CHANGED    Details   amitriptyline (ELAVIL) 150 MG tablet TAKE 1 TABLET BY MOUTH EVERY DAY AT NIGHT, Historical Med      dicyclomine (BENTYL) 20 mg tablet Take 1 tablet (20 mg total) by mouth every 6 (six) hours as needed (abdominal pain), Starting Mon 2/20/2023, Normal      methocarbamol (ROBAXIN) 500 mg tablet TAKE 1 TABLET BY MOUTH 3 TIMES A DAY AS NEEDED FOR MUSCLE SPASMS , Historical Med      Multiple Vitamins-Minerals (HAIR SKIN AND NAILS FORMULA PO) Take by mouth, Historical Med      other medication, see sig, Medical Marijuana, Historical Med      pantoprazole (PROTONIX) 40 mg tablet Take 1 tablet (40 mg total) by mouth daily, Starting Mon 2/20/2023, Normal             No discharge procedures on file      PDMP Review       Value Time User    PDMP Reviewed  Yes 10/21/2020  3:11 PM Yoselin Pendleton MD          ED Provider  Electronically Signed by           Mark Osborn MD  03/19/23 8151

## 2023-03-17 NOTE — TELEPHONE ENCOUNTER
Patient called in to check on the status of her forms for her workplace  Patient stated she did not receive the call and voicemail Naya sent on 3/7  Please call patient to discuss status of forms and to go over how she wants them filled out

## 2023-03-23 ENCOUNTER — OFFICE VISIT (OUTPATIENT)
Dept: GASTROENTEROLOGY | Facility: CLINIC | Age: 38
End: 2023-03-23

## 2023-03-23 VITALS
SYSTOLIC BLOOD PRESSURE: 102 MMHG | HEIGHT: 62 IN | BODY MASS INDEX: 25.54 KG/M2 | HEART RATE: 118 BPM | DIASTOLIC BLOOD PRESSURE: 70 MMHG | OXYGEN SATURATION: 98 % | WEIGHT: 138.8 LBS

## 2023-03-23 DIAGNOSIS — K21.9 GASTROESOPHAGEAL REFLUX DISEASE, UNSPECIFIED WHETHER ESOPHAGITIS PRESENT: ICD-10-CM

## 2023-03-23 DIAGNOSIS — K58.0 IRRITABLE BOWEL SYNDROME WITH DIARRHEA: Primary | ICD-10-CM

## 2023-03-23 DIAGNOSIS — K62.89 RECTAL PAIN: ICD-10-CM

## 2023-03-23 DIAGNOSIS — Z85.038 PERSONAL HISTORY OF COLON CANCER, STAGE II: ICD-10-CM

## 2023-03-23 NOTE — PATIENT INSTRUCTIONS
Scheduled date of colonoscopy (as of today):4/13/23  Physician performing colonoscopy:Jaguar  Location of colonoscopy:[facility]  Bowel prep reviewed with patient:Cristina/Miralax  Instructions reviewed with patient by:Dejan lam  Clearances:  none

## 2023-03-23 NOTE — PROGRESS NOTES
[facility] - Outpatient Follow-up Note  Bismark Jhaveri 45 y o  female MRN: [id number]  Encounter: [id number]          ASSESSMENT AND PLAN:      1  Irritable bowel syndrome with diarrhea  2  Gastroesophageal reflux disease, unspecified whether esophagitis present  Continue Dicyclomine prn  Increase Pantoprazole to BID  She is on Elavil from her PCP    She notes her symptoms of nausea and pain have improved at night (which is when she takes her meds) but are still problematic during the day    3  Personal history of colon cancer, stage II  4  Rectal pain  Update colonoscopy  Recent ER evaluation with rectal pain/bulge which is no longer present - occurs with bowel movements  Consider need for colorectal eval if colonoscopy is normal    ______________________________________________________________________    SUBJECTIVE: 79-year-old female with a history of irritable bowel syndrome, GERD and colorectal cancer who presents for follow-up  At her last appointment her major complaints were abdominal pain and nausea  She was started on both pantoprazole and dicyclomine  She has been taking both of these at night with her Elavil  She reports that overnight her symptoms have improved but she continues with nausea and pain during the day  She continues to struggle with bowel movements  She reports that this is because she does not always have access to the bathroom  Really finds that she has to hold her bowel movement in  If she is able to go to the bathroom as needed she reports that her bowel movements are regular  She has no rectal bleeding  He continues to report rectal pain  She recently noted a large bulge from the anal canal when having a bowel movement  Because of this she went to the emergency room for evaluation  A bulge could be felt during an internal rectal exam however nothing was visualized    A CT of the abdomen pelvis without contrast was normal   She is scheduled for colonoscopy routinely in May  She reports that the bulge is not there presently and only occurs when she bears down to have a bowel movement  REVIEW OF SYSTEMS IS OTHERWISE NEGATIVE        Historical Information   Past Medical History:   Diagnosis Date   • Anxiety    • Asthma    • Breast cyst, left    • Cancer ([address] ) 2019    colon   • Depression    • Disease of thyroid gland     Pt hasn't seen physician regarding this since    • Irritable bowel syndrome    • Uterine fibroid      Past Surgical History:   Procedure Laterality Date   • COLONOSCOPY     • CYSTOSCOPY N/A 10/21/2020    Procedure: CYSTOSCOPY;  Surgeon: Vu Laboy MD;  [facility];  Service: Gynecology   • HEMORRHOID SURGERY     • IR PICC PLACEMENT SINGLE LUMEN  10/30/2020   • MI LAPAROSCOPY COLECTOMY PARTIAL W/ANASTOMOSIS N/A 2019    Procedure: ROBOTIC ASSISTED ILEOCOLECTOMY;  Surgeon: Marisel Michel MD;  [facility];  Service: Robotics   • MI LAPS TOTAL HYSTERECT 250 GM/< W/RMVL TUBE/OVARY Bilateral 10/21/2020    Procedure: ROBOTIC ASSISTED LAPAROSCOPIC HYSTERECTOMY BILATERAL SALPINGECTOMY;  Surgeon: Vu Laboy MD;  [facility];  Service: Gynecology     Social History   Social History     Substance and Sexual Activity   Alcohol Use Yes   • Alcohol/week: 1 0 standard drink   • Types: 1 Glasses of wine per week     Social History     Substance and Sexual Activity   Drug Use Yes   • Types: Marijuana    Comment: medical marijuana     Social History     Tobacco Use   Smoking Status Former   • Types: Cigarettes   • Quit date:    • Years since quittin 2   Smokeless Tobacco Never     Family History   Problem Relation Age of Onset   • Thyroid cancer Mother    • Diabetes Father    • Heart disease Father    • Colon polyps Maternal Uncle    • Breast cancer Maternal Grandmother 52   • Prostate cancer Paternal Grandfather    • Pancreatic cancer Paternal Grandfather    • Colon polyps Paternal Aunt    • No Known Problems Sister    • No Known Problems Brother    • No Known Problems Daughter    • No Known Problems Brother    • No Known Problems Daughter    • Colon cancer Neg Hx    • Ovarian cancer Neg Hx    • Uterine cancer Neg Hx    • Cervical cancer Neg Hx        Meds/Allergies       Current Outpatient Medications:   •  amitriptyline (ELAVIL) 150 MG tablet  •  dicyclomine (BENTYL) 20 mg tablet  •  Multiple Vitamins-Minerals (HAIR SKIN AND NAILS FORMULA PO)  •  other medication, see sig,  •  pantoprazole (PROTONIX) 40 mg tablet  •  amitriptyline (ELAVIL) 25 mg tablet    Allergies   Allergen Reactions   • Nuts - Food Allergy      GI distress   • Peanut-Containing Drug Products - Food Allergy      GI distress           Objective     Blood pressure 102/70, pulse (!) 118, height 5' 2" (1 575 m), weight 63 kg (138 lb 12 8 oz), last menstrual period 09/04/2020, SpO2 98 %, not currently breastfeeding  Body mass index is 25 39 kg/m²  PHYSICAL EXAM:      General Appearance:   Alert, cooperative, no distress   HEENT:   Normocephalic, atraumatic, anicteric      Neck:  Supple, symmetrical, trachea midline   Lungs:   Clear to auscultation bilaterally; no rales, rhonchi or wheezing; respirations unlabored    Heart[de-identified]   Regular rate and rhythm; no murmur, rub, or gallop  Abdomen:   Soft, non-tender, non-distended; normal bowel sounds; no masses, no organomegaly    Genitalia:   Deferred    Rectal:   Deferred    Extremities:  No cyanosis, clubbing or edema    Pulses:  2+ and symmetric    Skin:  No jaundice, rashes, or lesions    Lymph nodes:  No palpable cervical lymphadenopathy        Lab Results:   No visits with results within 1 Day(s) from this visit     Latest known visit with results is:   Admission on 03/15/2023, Discharged on 03/16/2023   Component Date Value   • WBC 03/15/2023 7 96    • RBC 03/15/2023 3 87    • Hemoglobin 03/15/2023 12 0    • Hematocrit 03/15/2023 35 3    • MCV 03/15/2023 91    • MCH 03/15/2023 31 0    • MCHC 03/15/2023 34 0    • RDW 03/15/2023 12 0    • MPV 03/15/2023 10 1    • Platelets 11/70/4800 324    • nRBC 03/15/2023 0    • Neutrophils Relative 03/15/2023 62    • Immat GRANS % 03/15/2023 0    • Lymphocytes Relative 03/15/2023 29    • Monocytes Relative 03/15/2023 5    • Eosinophils Relative 03/15/2023 3    • Basophils Relative 03/15/2023 1    • Neutrophils Absolute 03/15/2023 4 92    • Immature Grans Absolute 03/15/2023 0 03    • Lymphocytes Absolute 03/15/2023 2 27    • Monocytes Absolute 03/15/2023 0 43    • Eosinophils Absolute 03/15/2023 0 24    • Basophils Absolute 03/15/2023 0 07    • Sodium 03/15/2023 137    • Potassium 03/15/2023 3 5    • Chloride 03/15/2023 105    • CO2 03/15/2023 24    • ANION GAP 03/15/2023 8    • BUN 03/15/2023 6    • Creatinine 03/15/2023 0 66    • Glucose 03/15/2023 101    • Calcium 03/15/2023 9 1    • AST 03/15/2023 38    • ALT 03/15/2023 12    • Alkaline Phosphatase 03/15/2023 52    • Total Protein 03/15/2023 7 2    • Albumin 03/15/2023 4 1    • Total Bilirubin 03/15/2023 0 25    • eGFR 03/15/2023 112          Radiology Results:   CT abdomen pelvis with contrast    Result Date: 3/15/2023  Narrative: CT ABDOMEN AND PELVIS WITH IV CONTRAST INDICATION:   evaluate perianal/perirectal abscess  COMPARISON:  1/31/2023  TECHNIQUE:  CT examination of the abdomen and pelvis was performed  Axial, sagittal, and coronal 2D reformatted images were created from the source data and submitted for interpretation  Radiation dose length product (DLP) for this visit:  601 mGy-cm   This examination, like all CT scans performed in the [facility], was performed utilizing techniques to minimize radiation dose exposure, including the use of iterative reconstruction and automated exposure control  IV Contrast:  100 mL of iohexol (OMNIPAQUE) Enteric Contrast:  Enteric contrast was not administered   FINDINGS: ABDOMEN LOWER CHEST:  No clinically significant abnormality identified in the visualized lower chest  LIVER/BILIARY TREE:  Unremarkable  GALLBLADDER:  No calcified gallstones  No pericholecystic inflammatory change  SPLEEN:  Unremarkable  PANCREAS:  Unremarkable  ADRENAL GLANDS:  Unremarkable  KIDNEYS/URETERS:  Unremarkable  No hydronephrosis  STOMACH AND BOWEL:  Post right hemicolectomy  APPENDIX:  Post right hemicolectomy  ABDOMINOPELVIC CAVITY:  No ascites  No pneumoperitoneum  Periduodenal adenopathy unchanged    VESSELS:  Unremarkable for patient's age  PELVIS REPRODUCTIVE ORGANS:  Unremarkable for patient's age  URINARY BLADDER:  Unremarkable  ABDOMINAL WALL/INGUINAL REGIONS:  Unremarkable  No perirectal abnormality  OSSEOUS STRUCTURES:  No acute fracture or destructive osseous lesion  Impression: No acute pathology visualized on CT of the abdomen and pelvis with IV without oral contrast  Recommend GI consultation for further evaluation   Workstation performed: [id number]

## 2023-11-03 ENCOUNTER — TELEPHONE (OUTPATIENT)
Age: 38
End: 2023-11-03

## 2023-11-03 NOTE — TELEPHONE ENCOUNTER
Pt is requesting a letter and medical records. I can facility in providing pt with medical records. Please advise Thanks. Kendrick Lennox

## 2023-11-03 NOTE — TELEPHONE ENCOUNTER
Patients GI provider:  EVELIA Sen     Number to return call: [phone]    Reason for call: Pt calling regarding paperwork that was recently filled out earlier this year. Pt explained that she was very ill working at [employer] and they didn't accommodate her needs and now [employer] is taking her to court stating she left on her own and they never had any paperwork from office. Pt is requesting documentation on all visits and notes that relate to this, or a note from provider explaining patients conditions due to needing it for court.     Scheduled procedure/appointment date if applicable: N/A

## 2023-11-09 DIAGNOSIS — K58.0 IRRITABLE BOWEL SYNDROME WITH DIARRHEA: ICD-10-CM

## 2023-11-10 RX ORDER — DICYCLOMINE HCL 20 MG
20 TABLET ORAL EVERY 6 HOURS PRN
Qty: 45 TABLET | Refills: 3 | Status: SHIPPED | OUTPATIENT
Start: 2023-11-10

## 2023-11-10 NOTE — TELEPHONE ENCOUNTER
Called and spoke with pt. She is going to be by before 4:30 today to get the last to office notes she needs.

## 2023-11-10 NOTE — TELEPHONE ENCOUNTER
Pt called back to follow up on letter that is needed for court date which is 11/14/23    Call back # [phone]

## 2023-11-29 RX ORDER — HYDROCODONE BITARTRATE AND ACETAMINOPHEN 5; 325 MG/1; MG/1
TABLET ORAL
COMMUNITY
Start: 2023-09-01 | End: 2023-11-30

## 2023-11-29 RX ORDER — CEPHALEXIN 500 MG/1
CAPSULE ORAL
COMMUNITY
Start: 2023-09-01 | End: 2023-11-30

## 2023-11-30 ENCOUNTER — OFFICE VISIT (OUTPATIENT)
Dept: GASTROENTEROLOGY | Facility: CLINIC | Age: 38
End: 2023-11-30
Payer: COMMERCIAL

## 2023-11-30 ENCOUNTER — TELEPHONE (OUTPATIENT)
Dept: HEMATOLOGY ONCOLOGY | Facility: CLINIC | Age: 38
End: 2023-11-30

## 2023-11-30 VITALS
OXYGEN SATURATION: 97 % | HEART RATE: 120 BPM | BODY MASS INDEX: 25.43 KG/M2 | HEIGHT: 62 IN | DIASTOLIC BLOOD PRESSURE: 80 MMHG | WEIGHT: 138.2 LBS | SYSTOLIC BLOOD PRESSURE: 112 MMHG

## 2023-11-30 DIAGNOSIS — K58.0 IRRITABLE BOWEL SYNDROME WITH DIARRHEA: ICD-10-CM

## 2023-11-30 DIAGNOSIS — K21.9 GASTROESOPHAGEAL REFLUX DISEASE WITHOUT ESOPHAGITIS: ICD-10-CM

## 2023-11-30 DIAGNOSIS — K21.9 GASTROESOPHAGEAL REFLUX DISEASE, UNSPECIFIED WHETHER ESOPHAGITIS PRESENT: Primary | ICD-10-CM

## 2023-11-30 PROCEDURE — 99213 OFFICE O/P EST LOW 20 MIN: CPT | Performed by: PHYSICIAN ASSISTANT

## 2023-11-30 RX ORDER — PANTOPRAZOLE SODIUM 40 MG/1
40 TABLET, DELAYED RELEASE ORAL 2 TIMES DAILY
Qty: 60 TABLET | Refills: 6 | Status: SHIPPED | OUTPATIENT
Start: 2023-11-30

## 2023-11-30 NOTE — TELEPHONE ENCOUNTER
I called Sixto Fernandez in response to a referral that was received for patient to establish care with Thoracic Surgery. Outreach was made to complete patient's intake questionnaire . Patient's intake questionnaire was reviewed and complete. Patient's intake has been sent to the team for clinical review.

## 2023-11-30 NOTE — PROGRESS NOTES
[facility] - Outpatient Follow-up Note  Sarah Youngblood 45 y.o. female MRN: [id number]  Encounter: [id number]          ASSESSMENT AND PLAN:      1. Gastroesophageal reflux disease, unspecified whether esophagitis present  She is on Pantoprazole 40mg once daily with ongoing heartburn and regurgitation most significant at night  EGD from 2021 showed a 5cm hiatal hernia  She is very interested in repair  Will plan barium swallow  Will repeat EGD  Will refer to thoracic surgery - will defer manometry unless required by surgery    Increase PPI to BID  Avoid eating within 2 hours lying down    2. Irritable bowel syndrome with diarrhea  Continues to be problematic  She had a hard time getting stool out in the morning and then it becomes watery and painful  She has failed fiber, probiotic, xifaxan, low fodmap diet    3. Personal history of colon cancer, stage II   Colonoscopy 4/2023 was normal  She has 2 lymph nodes in the abdominal cavity which had been monitored closely by oncology showing no growth over 3 years  She has not f/u with them since May of 2022 - she will call for an appt    ______________________________________________________________________    SUBJECTIVE: 27-year-old female with a history of colon cancer, GERD, irritable bowel syndrome who presents for routine follow-up. Unfortunately, after her last appointment over the summer she lost her insurance and was unable to continue her medications. She developed significant symptoms specifically of reflux and vomiting at that time. Over the past few weeks she is back on all of her medications as her insurance has been reestablished however her symptoms are not improving. She is on amitriptyline 150 mg at bedtime, dicyclomine 20 mg at bedtime and as needed, pantoprazole 40 mg daily. She reports ongoing heartburn, regurgitation, vomiting. She reports that her symptoms are most significant at night while sleeping.   She continues to report issues with her bowel movements. She fluctuates between diarrhea and constipation. She reports that her bowel movements are very painful. She has taken fiber, probiotic, Xifaxan, dicyclomine and most recently has trialed a low FODMAP diet without improvement in the symptoms. She had a colonoscopy in April of last year which was a normal exam.  Last upper endoscopy was in 2021 with a noted 5 cm hiatal hernia. She is wondering if this could be repaired. REVIEW OF SYSTEMS IS OTHERWISE NEGATIVE.       Historical Information   Past Medical History:   Diagnosis Date    Anxiety     Asthma     Breast cyst, left     Cancer ([address]) 2019    colon    Depression     Disease of thyroid gland     Pt hasn't seen physician regarding this since 2009    Irritable bowel syndrome     Uterine fibroid      Past Surgical History:   Procedure Laterality Date    ABDOMINAL SURGERY      COLONOSCOPY      CYSTOSCOPY N/A 10/21/2020    Procedure: CYSTOSCOPY;  Surgeon: Denver Enciso MD;  [facility];  Service: Gynecology    HEMORRHOID SURGERY      HYSTERECTOMY      IR PICC PLACEMENT SINGLE LUMEN  10/30/2020    NV LAPAROSCOPY COLECTOMY PARTIAL W/ANASTOMOSIS N/A 11/12/2019    Procedure: ROBOTIC ASSISTED ILEOCOLECTOMY;  Surgeon: Jenifer Nye MD;  [facility]: [facility];  Service: Robotics    NV LAPS TOTAL HYSTERECT 250 GM/< W/RMVL TUBE/OVARY Bilateral 10/21/2020    Procedure: ROBOTIC ASSISTED LAPAROSCOPIC HYSTERECTOMY BILATERAL SALPINGECTOMY;  Surgeon: Denver Enciso MD;  [facility];  Service: Gynecology     Social History   Social History     Substance and Sexual Activity   Alcohol Use Yes    Alcohol/week: 1.0 standard drink of alcohol    Types: 1 Glasses of wine per week     Social History     Substance and Sexual Activity   Drug Use Yes    Types: Marijuana    Comment: medical marijuana     Social History     Tobacco Use   Smoking Status Former    Types: Cigarettes    Quit date: 2017    Years since quitting: 6.9   Smokeless Tobacco Never     Family History   Problem Relation Age of Onset    Thyroid cancer Mother     Diabetes Father     Heart disease Father     Colon polyps Maternal Uncle     Breast cancer Maternal Grandmother 52    Prostate cancer Paternal Grandfather     Pancreatic cancer Paternal Grandfather     Colon polyps Paternal Aunt     No Known Problems Sister     No Known Problems Brother     No Known Problems Daughter     No Known Problems Brother     No Known Problems Daughter     Colon cancer Neg Hx     Ovarian cancer Neg Hx     Uterine cancer Neg Hx     Cervical cancer Neg Hx        Meds/Allergies       Current Outpatient Medications:     amitriptyline (ELAVIL) 150 MG tablet    dicyclomine (BENTYL) 20 mg tablet    Multiple Vitamins-Minerals (HAIR SKIN AND NAILS FORMULA PO)    other medication, see sig,    pantoprazole (PROTONIX) 40 mg tablet    Allergies   Allergen Reactions    Nuts - Food Allergy      GI distress    Peanut-Containing Drug Products - Food Allergy      GI distress           Objective     Blood pressure 112/80, pulse (!) 120, height 5' 2" (1.575 m), weight 62.7 kg (138 lb 3.2 oz), last menstrual period 09/04/2020, SpO2 97 %, not currently breastfeeding. Body mass index is 25.28 kg/m². PHYSICAL EXAM:      General Appearance:   Alert, cooperative, no distress   HEENT:   Normocephalic, atraumatic, anicteric. Neck:  Supple, symmetrical, trachea midline   Lungs:   Clear to auscultation bilaterally; no rales, rhonchi or wheezing; respirations unlabored    Heart[de-identified]   Regular rate and rhythm; no murmur, rub, or gallop.    Abdomen:   Soft, non-tender, non-distended; normal bowel sounds; no masses, no organomegaly    Genitalia:   Deferred    Rectal:   Deferred    Extremities:  No cyanosis, clubbing or edema    Pulses:  2+ and symmetric    Skin:  No jaundice, rashes, or lesions    Lymph nodes:  No palpable cervical lymphadenopathy        Lab Results:   No visits with results within 1 Day(s) from this visit. Latest known visit with results is:   Admission on 03/15/2023, Discharged on 03/16/2023   Component Date Value    WBC 03/15/2023 7.96     RBC 03/15/2023 3.87     Hemoglobin 03/15/2023 12.0     Hematocrit 03/15/2023 35.3     MCV 03/15/2023 91     MCH 03/15/2023 31.0     MCHC 03/15/2023 34.0     RDW 03/15/2023 12.0     MPV 03/15/2023 10.1     Platelets 70/23/3178 324     nRBC 03/15/2023 0     Neutrophils Relative 03/15/2023 62     Immat GRANS % 03/15/2023 0     Lymphocytes Relative 03/15/2023 29     Monocytes Relative 03/15/2023 5     Eosinophils Relative 03/15/2023 3     Basophils Relative 03/15/2023 1     Neutrophils Absolute 03/15/2023 4.92     Immature Grans Absolute 03/15/2023 0.03     Lymphocytes Absolute 03/15/2023 2.27     Monocytes Absolute 03/15/2023 0.43     Eosinophils Absolute 03/15/2023 0.24     Basophils Absolute 03/15/2023 0.07     Sodium 03/15/2023 137     Potassium 03/15/2023 3.5     Chloride 03/15/2023 105     CO2 03/15/2023 24     ANION GAP 03/15/2023 8     BUN 03/15/2023 6     Creatinine 03/15/2023 0.66     Glucose 03/15/2023 101     Calcium 03/15/2023 9.1     AST 03/15/2023 38     ALT 03/15/2023 12     Alkaline Phosphatase 03/15/2023 52     Total Protein 03/15/2023 7.2     Albumin 03/15/2023 4.1     Total Bilirubin 03/15/2023 0.25     eGFR 03/15/2023 112          Radiology Results:   No results found.

## 2023-12-05 ENCOUNTER — TELEPHONE (OUTPATIENT)
Dept: GASTROENTEROLOGY | Facility: CLINIC | Age: 38
End: 2023-12-05

## 2023-12-12 ENCOUNTER — ANESTHESIA (OUTPATIENT)
Dept: GASTROENTEROLOGY | Facility: HOSPITAL | Age: 38
End: 2023-12-12

## 2023-12-12 ENCOUNTER — ANESTHESIA EVENT (OUTPATIENT)
Dept: GASTROENTEROLOGY | Facility: HOSPITAL | Age: 38
End: 2023-12-12

## 2023-12-12 ENCOUNTER — HOSPITAL ENCOUNTER (OUTPATIENT)
Dept: GASTROENTEROLOGY | Facility: HOSPITAL | Age: 38
Setting detail: OUTPATIENT SURGERY
Discharge: HOME | End: 2023-12-12
Payer: COMMERCIAL

## 2023-12-12 VITALS
HEART RATE: 96 BPM | BODY MASS INDEX: 25.4 KG/M2 | OXYGEN SATURATION: 100 % | SYSTOLIC BLOOD PRESSURE: 110 MMHG | WEIGHT: 138.01 LBS | RESPIRATION RATE: 20 BRPM | DIASTOLIC BLOOD PRESSURE: 69 MMHG | HEIGHT: 62 IN | TEMPERATURE: 97.7 F

## 2023-12-12 DIAGNOSIS — K21.9 GASTROESOPHAGEAL REFLUX DISEASE, UNSPECIFIED WHETHER ESOPHAGITIS PRESENT: ICD-10-CM

## 2023-12-12 PROCEDURE — 43247 EGD REMOVE FOREIGN BODY: CPT | Performed by: INTERNAL MEDICINE

## 2023-12-12 RX ORDER — SODIUM CHLORIDE, SODIUM LACTATE, POTASSIUM CHLORIDE, CALCIUM CHLORIDE 600; 310; 30; 20 MG/100ML; MG/100ML; MG/100ML; MG/100ML
INJECTION, SOLUTION INTRAVENOUS CONTINUOUS PRN
Status: DISCONTINUED | OUTPATIENT
Start: 2023-12-12 | End: 2023-12-12

## 2023-12-12 RX ORDER — PROPOFOL 10 MG/ML
INJECTION, EMULSION INTRAVENOUS AS NEEDED
Status: DISCONTINUED | OUTPATIENT
Start: 2023-12-12 | End: 2023-12-12

## 2023-12-12 RX ORDER — ALBUTEROL SULFATE 2.5 MG/3ML
2.5 SOLUTION RESPIRATORY (INHALATION) ONCE
Status: COMPLETED | OUTPATIENT
Start: 2023-12-12 | End: 2023-12-12

## 2023-12-12 RX ORDER — LIDOCAINE HYDROCHLORIDE 20 MG/ML
INJECTION, SOLUTION EPIDURAL; INFILTRATION; INTRACAUDAL; PERINEURAL AS NEEDED
Status: DISCONTINUED | OUTPATIENT
Start: 2023-12-12 | End: 2023-12-12

## 2023-12-12 RX ADMIN — PROPOFOL 100 MG: 10 INJECTION, EMULSION INTRAVENOUS at 07:49

## 2023-12-12 RX ADMIN — ALBUTEROL SULFATE 2.5 MG: 2.5 SOLUTION RESPIRATORY (INHALATION) at 08:26

## 2023-12-12 RX ADMIN — SODIUM CHLORIDE, SODIUM LACTATE, POTASSIUM CHLORIDE, AND CALCIUM CHLORIDE: .6; .31; .03; .02 INJECTION, SOLUTION INTRAVENOUS at 07:50

## 2023-12-12 RX ADMIN — PROPOFOL 50 MG: 10 INJECTION, EMULSION INTRAVENOUS at 07:54

## 2023-12-12 RX ADMIN — PROPOFOL 50 MG: 10 INJECTION, EMULSION INTRAVENOUS at 07:53

## 2023-12-12 RX ADMIN — PROPOFOL 50 MG: 10 INJECTION, EMULSION INTRAVENOUS at 07:51

## 2023-12-12 RX ADMIN — LIDOCAINE HYDROCHLORIDE 50 MG: 20 INJECTION, SOLUTION EPIDURAL; INFILTRATION; INTRACAUDAL; PERINEURAL at 07:49

## 2023-12-12 NOTE — H&P
History and Physical - [facility]  Severa Power 45 y.o. female MRN: [id number]      HPI: Severa Power is a 45y.o. year old female who presents for evaluation of gastroesophageal reflux disease      REVIEW OF SYSTEMS: Per the HPI, and otherwise unremarkable.     Historical Information   Past Medical History:   Diagnosis Date    Anxiety     Asthma     Breast cyst, left     Cancer ([address])     colon    Depression     Disease of thyroid gland     Pt hasn't seen physician regarding this since     Irritable bowel syndrome     Uterine fibroid      Past Surgical History:   Procedure Laterality Date    ABDOMINAL SURGERY      COLONOSCOPY      CYSTOSCOPY N/A 10/21/2020    Procedure: CYSTOSCOPY;  Surgeon: Gillian Lucero MD;  [facility];  Service: Gynecology    HEMORRHOID SURGERY      HYSTERECTOMY      IR PICC PLACEMENT SINGLE LUMEN  10/30/2020    NE LAPAROSCOPY COLECTOMY PARTIAL W/ANASTOMOSIS N/A 2019    Procedure: ROBOTIC ASSISTED ILEOCOLECTOMY;  Surgeon: May Davidson MD;  [facility];  Service: Robotics    NE LAPS TOTAL HYSTERECT 250 GM/< W/RMVL TUBE/OVARY Bilateral 10/21/2020    Procedure: ROBOTIC ASSISTED LAPAROSCOPIC HYSTERECTOMY BILATERAL SALPINGECTOMY;  Surgeon: Gillian Lucero MD;  [facility];  Service: Gynecology     Social History   Social History     Substance and Sexual Activity   Alcohol Use Yes    Alcohol/week: 1.0 standard drink of alcohol    Types: 1 Glasses of wine per week    Comment: occasionally     Social History     Substance and Sexual Activity   Drug Use Yes    Frequency: 7.0 times per week    Types: Marijuana    Comment: medical marijuana     Social History     Tobacco Use   Smoking Status Former    Types: Cigarettes    Quit date:     Years since quittin.9   Smokeless Tobacco Never     Family History   Problem Relation Age of Onset    Thyroid cancer Mother     Diabetes Father     Heart disease Father     Colon polyps Maternal Uncle     Breast cancer Maternal Grandmother 52    Prostate cancer Paternal Grandfather     Pancreatic cancer Paternal Grandfather     Colon polyps Paternal Aunt     No Known Problems Sister     No Known Problems Brother     No Known Problems Daughter     No Known Problems Brother     No Known Problems Daughter     Colon cancer Neg Hx     Ovarian cancer Neg Hx     Uterine cancer Neg Hx     Cervical cancer Neg Hx        Meds/Allergies     (Not in a hospital admission)      Allergies   Allergen Reactions    Nuts - Food Allergy      GI distress    Peanut-Containing Drug Products - Food Allergy      GI distress       Objective     Blood pressure 113/76, pulse 85, temperature (!) 97.2 °F (36.2 °C), temperature source Temporal, resp. rate 22, height 5' 2" (1.575 m), weight 62.6 kg (138 lb 0.1 oz), last menstrual period 09/04/2020, SpO2 98 %, not currently breastfeeding. PHYSICAL EXAM    Gen: NAD  CV: RRR  CHEST: Clear  ABD: soft, NT/ND  EXT: no edema      ASSESSMENT/PLAN:  This is a 45y.o. year old female here for EGD, and she is stable and optimized for her procedure.

## 2023-12-12 NOTE — ANESTHESIA PREPROCEDURE EVALUATION
Procedure:  EGD    Relevant Problems   GI/HEPATIC   (+) Cancer of hepatic flexure (HCC)   (+) Small bowel obstruction (HCC)      HEMATOLOGY   (+) Iron deficiency anemia      NEURO/PSYCH   (+) Anxiety   (+) Depression      PULMONARY   (+) Asthma   (+) Mild intermittent asthma without complication      Other   (+) Mesenteric lymphadenopathy        Physical Exam    Airway    Mallampati score: I  TM Distance: >3 FB  Neck ROM: full     Dental   No notable dental hx     Cardiovascular      Pulmonary      Other Findings  post-pubertal.      Anesthesia Plan  ASA Score- 2     Anesthesia Type- IV sedation with anesthesia with ASA Monitors. Additional Monitors:     Airway Plan:            Plan Factors-    Chart reviewed. Patient summary reviewed. Induction- intravenous. Postoperative Plan-     Informed Consent- Anesthetic plan and risks discussed with patient. I personally reviewed this patient with the CRNA. Discussed and agreed on the Anesthesia Plan with the CRNA. Maritza Cho

## 2023-12-12 NOTE — ANESTHESIA POSTPROCEDURE EVALUATION
Post-Op Assessment Note    CV Status:  Stable  Pain Score: 0    Pain management: adequate       Mental Status:  Alert   Hydration Status:  Stable   PONV Controlled:  None   Airway Patency:  Patent     Post Op Vitals Reviewed: Yes      Staff: CRNA               BP   113/74   Temp  98   Pulse 103 (12/12/23 0807)   Resp   22   SpO2 98 % (12/12/23 0807)

## 2023-12-15 ENCOUNTER — HOSPITAL ENCOUNTER (OUTPATIENT)
Dept: RADIOLOGY | Facility: HOSPITAL | Age: 38
End: 2023-12-15
Payer: COMMERCIAL

## 2023-12-15 DIAGNOSIS — K21.9 GASTROESOPHAGEAL REFLUX DISEASE, UNSPECIFIED WHETHER ESOPHAGITIS PRESENT: ICD-10-CM

## 2023-12-15 PROCEDURE — 74220 X-RAY XM ESOPHAGUS 1CNTRST: CPT

## 2023-12-18 ENCOUNTER — PREP FOR PROCEDURE (OUTPATIENT)
Dept: GASTROENTEROLOGY | Facility: CLINIC | Age: 38
End: 2023-12-18

## 2023-12-18 ENCOUNTER — TELEPHONE (OUTPATIENT)
Dept: HEMATOLOGY ONCOLOGY | Facility: CLINIC | Age: 38
End: 2023-12-18

## 2023-12-18 DIAGNOSIS — R13.19 ESOPHAGEAL DYSPHAGIA: ICD-10-CM

## 2023-12-18 DIAGNOSIS — K21.9 GASTROESOPHAGEAL REFLUX DISEASE, UNSPECIFIED WHETHER ESOPHAGITIS PRESENT: Primary | ICD-10-CM

## 2023-12-18 NOTE — TELEPHONE ENCOUNTER
Appointment Change  Cancel, Reschedule, Change to Virtual      Who are you speaking with? Patient   If it is not the patient, is the caller listed on the communication consent form? N/A   Which provider is the appointment scheduled with? Dr. Diaz   When was the original appointment scheduled?    Please list date and time 12/19/23 @ 9:30am   At which location is the appointment scheduled to take place? [facility] ([facility])   Was the appointment rescheduled?     Was the appointment changed from an in person visit to a virtual visit?    If so, please list the details of the change. No   What is the reason for the appointment change? Patient was informed by referring physician to cancel due to new diagnosis       Was STAR transport scheduled? no   Does STAR transport need to be scheduled for the new visit (if applicable) no   Does the patient need an infusion appointment rescheduled? no   Does the patient have an upcoming infusion appointment scheduled? If so, when? no   Is the patient undergoing chemotherapy? no   For appointments cancelled with less than 24 hours:  Was the no-show policy reviewed? yes

## 2023-12-22 ENCOUNTER — TELEPHONE (OUTPATIENT)
Dept: GASTROENTEROLOGY | Facility: HOSPITAL | Age: 38
End: 2023-12-22

## 2023-12-27 ENCOUNTER — TELEPHONE (OUTPATIENT)
Dept: GASTROENTEROLOGY | Facility: HOSPITAL | Age: 38
End: 2023-12-27

## 2024-01-02 ENCOUNTER — HOSPITAL ENCOUNTER (OUTPATIENT)
Dept: GASTROENTEROLOGY | Facility: HOSPITAL | Age: 39
Discharge: HOME | End: 2024-01-02
Payer: COMMERCIAL

## 2024-01-02 VITALS
RESPIRATION RATE: 16 BRPM | SYSTOLIC BLOOD PRESSURE: 117 MMHG | DIASTOLIC BLOOD PRESSURE: 81 MMHG | HEART RATE: 106 BPM | OXYGEN SATURATION: 100 % | TEMPERATURE: 97.7 F

## 2024-01-02 DIAGNOSIS — R13.19 ESOPHAGEAL DYSPHAGIA: ICD-10-CM

## 2024-01-02 DIAGNOSIS — K21.9 GASTROESOPHAGEAL REFLUX DISEASE, UNSPECIFIED WHETHER ESOPHAGITIS PRESENT: ICD-10-CM

## 2024-01-02 PROCEDURE — 91010 ESOPHAGUS MOTILITY STUDY: CPT

## 2024-01-02 NOTE — PERIOPERATIVE NURSING NOTE
Patient brought in the room and explained the esophageal manometry procedure. After the confirmation of allergies, lidocaine 2 % viscous given via nostrils and  a transnasal insertion of the High Resolution esophageal manometry catheter was inserted via right nostril. Patient given water to drink during the insertion and once the catheter inserted pressure bands of both Upper esophageal sphincter  (UES) and Lower esophageal sphincter ( LES) were observed on the color contour. Patient instructed to take a deep breath to verify placement of the catheter, diaphragmatic pinch noted on inspiration. Catheter was secured to right cheek. Patient was assisted to supine position .Patient was instructed to relax  while acclimating the catheter for about 5 minutes. A 30 second baseline resting pressure was obtained to identify the UES and LES followed by a series of 10 liquid swallows using 5 cc room temperature normal saline to assess esophageal motility and bolus transit. Patient administered 7 viscous swallows using 5 cc viscous solution, 1 multiple rapid drink swallow using 2 cc room temperature normal saline given a total of 5 drinks. Patient instructed to sit up at the edge of the stretcher and given 5 upright liquid swallows using 5 cc room temperature normal saline and 1 rapid drink challenge using 200 cc room temperature water. At the end of the procedure the high resolution esophageal manometry catheter was removed from the nostril intact. Patient given discharge instructions and patient left in stable condition.

## 2024-01-03 PROCEDURE — 91020 GASTRIC MOTILITY STUDIES: CPT | Performed by: INTERNAL MEDICINE

## 2024-01-03 RX ORDER — ALBUTEROL SULFATE 90 UG/1
AEROSOL, METERED RESPIRATORY (INHALATION)
COMMUNITY
Start: 2023-12-12

## 2024-01-04 ENCOUNTER — OFFICE VISIT (OUTPATIENT)
Dept: GASTROENTEROLOGY | Facility: CLINIC | Age: 39
End: 2024-01-04
Payer: COMMERCIAL

## 2024-01-04 VITALS
DIASTOLIC BLOOD PRESSURE: 80 MMHG | HEIGHT: 62 IN | HEART RATE: 108 BPM | OXYGEN SATURATION: 99 % | SYSTOLIC BLOOD PRESSURE: 116 MMHG | BODY MASS INDEX: 25.28 KG/M2 | WEIGHT: 137.4 LBS

## 2024-01-04 DIAGNOSIS — R68.81 EARLY SATIETY: ICD-10-CM

## 2024-01-04 DIAGNOSIS — K21.9 GASTROESOPHAGEAL REFLUX DISEASE, UNSPECIFIED WHETHER ESOPHAGITIS PRESENT: Primary | ICD-10-CM

## 2024-01-04 DIAGNOSIS — R11.0 NAUSEA: ICD-10-CM

## 2024-01-04 PROCEDURE — 99213 OFFICE O/P EST LOW 20 MIN: CPT | Performed by: PHYSICIAN ASSISTANT

## 2024-01-04 NOTE — PROGRESS NOTES
[facility] - Outpatient Follow-up Note  Elizabeth Youngblood 38 y.o. female MRN: [id number]  Encounter: [id number]          ASSESSMENT AND PLAN:      1. Gastroesophageal reflux disease, unspecified whether esophagitis present  2. Nausea  She continues with significant symptoms  She remains on Pantoprazole BID and Zofran PRN  She is utilizing Zofran prn which does help    At her last appt she decided she wanted to puruse reflux surgery  Because of this we sent for a barium swallow which suggested esophageal dysmotility  Esophageal manometry was performed and other than her noted hiatal hernia was normal    She had an EGD in December with her noted hernia as well as food retention in her stomach  Will sent for GES    Will continue to hold off on surgical referral until these other issues are sorted out  ______________________________________________________________________    SUBJECTIVE: 89-year-old female with a long history of acid reflux, hiatal hernia and colorectal cancer who presents for follow-up after testing.  At her last appointment she had reported wanting to pursue antireflux surgery.  Because of this she was sent for a barium swallow.  The barium swallow suggested possible esophageal dysmotility.  It also documented a 2 cm hiatal hernia.  She was then sent for esophageal manometry which she had performed earlier this week.  Her motility was noted to be normal but again this documented a 2 cm hiatal hernia.  She had an upper endoscopy in preparation for surgery in December.  This documented hiatal hernia as well as food retention in the stomach.  Upon review of her previous records it appears she has never had a gastric emptying study.  She continues to report regurgitation, reflux and nausea.  She has been on Reglan in the past with no documented improvement in her symptoms however it has been over a year since we tried this.  She denies any hematemesis or melena.      REVIEW OF  SYSTEMS IS OTHERWISE NEGATIVE.      Historical Information   Past Medical History:   Diagnosis Date    Anxiety     Asthma     Breast cyst, left     Cancer (HCC) 2019    colon    Depression     Disease of thyroid gland     Pt hasn't seen physician regarding this since     Irritable bowel syndrome     Uterine fibroid      Past Surgical History:   Procedure Laterality Date    ABDOMINAL SURGERY      COLONOSCOPY      CYSTOSCOPY N/A 10/21/2020    Procedure: CYSTOSCOPY;  Surgeon: Alaina Beal MD;  Location: [facility];  Service: Gynecology    HEMORRHOID SURGERY      HYSTERECTOMY      IR PICC PLACEMENT SINGLE LUMEN  10/30/2020    [place] LAPAROSCOPY COLECTOMY PARTIAL W/ANASTOMOSIS N/A 2019    Procedure: ROBOTIC ASSISTED ILEOCOLECTOMY;  Surgeon: NICK Terry MD;  Location: [facility];  Service: Robotics    WV LAPS TOTAL HYSTERECT 250 GM/< W/RMVL TUBE/OVARY Bilateral 10/21/2020    Procedure: ROBOTIC ASSISTED LAPAROSCOPIC HYSTERECTOMY BILATERAL SALPINGECTOMY;  Surgeon: Alaina Beal MD;  Location: [facility];  Service: Gynecology    UPPER ENDOSCOPY W/ ESOPHAGEAL MANOMETRY  2023     Social History   Social History     Substance and Sexual Activity   Alcohol Use Yes    Alcohol/week: 1.0 standard drink of alcohol    Types: 1 Glasses of wine per week    Comment: occasionally     Social History     Substance and Sexual Activity   Drug Use Yes    Frequency: 7.0 times per week    Types: Marijuana    Comment: medical marijuana     Social History     Tobacco Use   Smoking Status Former    Current packs/day: 0.00    Types: Cigarettes    Quit date:     Years since quittin.0   Smokeless Tobacco Never     Family History   Problem Relation Age of Onset    Thyroid cancer Mother     Diabetes Father     Heart disease Father     Colon polyps Maternal Uncle     Breast cancer Maternal Grandmother 47    Prostate cancer Paternal Grandfather     Pancreatic cancer Paternal Grandfather     Colon polyps Paternal Aunt     No  "Known Problems Sister     No Known Problems Brother     No Known Problems Daughter     No Known Problems Brother     No Known Problems Daughter     Colon cancer Neg Hx     Ovarian cancer Neg Hx     Uterine cancer Neg Hx     Cervical cancer Neg Hx        Meds/Allergies       Current Outpatient Medications:     albuterol (PROVENTIL HFA,VENTOLIN HFA) 90 mcg/act inhaler    amitriptyline (ELAVIL) 150 MG tablet    dicyclomine (BENTYL) 20 mg tablet    Multiple Vitamins-Minerals (HAIR SKIN AND NAILS FORMULA PO)    other medication, see sig,    pantoprazole (PROTONIX) 40 mg tablet    Allergies   Allergen Reactions    Nuts - Food Allergy      GI distress    Peanut-Containing Drug Products - Food Allergy      GI distress           Objective     Blood pressure 116/80, pulse (!) 108, height 5' 2\" (1.575 m), weight 62.3 kg (137 lb 6.4 oz), last menstrual period 09/04/2020, SpO2 99%, not currently breastfeeding. Body mass index is 25.13 kg/m².      PHYSICAL EXAM:      General Appearance:   Alert, cooperative, no distress   HEENT:   Normocephalic, atraumatic, anicteric.     Neck:  Supple, symmetrical, trachea midline   Lungs:   Clear to auscultation bilaterally; no rales, rhonchi or wheezing; respirations unlabored    Heart::   Regular rate and rhythm; no murmur, rub, or gallop.   Abdomen:   Soft, non-tender, non-distended; normal bowel sounds; no masses, no organomegaly    Genitalia:   Deferred    Rectal:   Deferred    Extremities:  No cyanosis, clubbing or edema    Pulses:  2+ and symmetric    Skin:  No jaundice, rashes, or lesions    Lymph nodes:  No palpable cervical lymphadenopathy        Lab Results:   No visits with results within 1 Day(s) from this visit.   Latest known visit with results is:   Admission on 03/15/2023, Discharged on 03/16/2023   Component Date Value    WBC 03/15/2023 7.96     RBC 03/15/2023 3.87     Hemoglobin 03/15/2023 12.0     Hematocrit 03/15/2023 35.3     MCV 03/15/2023 91     MCH 03/15/2023 31.0     " MCHC 03/15/2023 34.0     RDW 03/15/2023 12.0     MPV 03/15/2023 10.1     Platelets 03/15/2023 324     nRBC 03/15/2023 0     Neutrophils Relative 03/15/2023 62     Immat GRANS % 03/15/2023 0     Lymphocytes Relative 03/15/2023 29     Monocytes Relative 03/15/2023 5     Eosinophils Relative 03/15/2023 3     Basophils Relative 03/15/2023 1     Neutrophils Absolute 03/15/2023 4.92     Immature Grans Absolute 03/15/2023 0.03     Lymphocytes Absolute 03/15/2023 2.27     Monocytes Absolute 03/15/2023 0.43     Eosinophils Absolute 03/15/2023 0.24     Basophils Absolute 03/15/2023 0.07     Sodium 03/15/2023 137     Potassium 03/15/2023 3.5     Chloride 03/15/2023 105     CO2 03/15/2023 24     ANION GAP 03/15/2023 8     BUN 03/15/2023 6     Creatinine 03/15/2023 0.66     Glucose 03/15/2023 101     Calcium 03/15/2023 9.1     AST 03/15/2023 38     ALT 03/15/2023 12     Alkaline Phosphatase 03/15/2023 52     Total Protein 03/15/2023 7.2     Albumin 03/15/2023 4.1     Total Bilirubin 03/15/2023 0.25     eGFR 03/15/2023 112          Radiology Results:   Esophageal manometry    Result Date: 1/3/2024  Narrative: Indication-GERD Esophageal manometry Motility-10 out of 10 swallows demonstrated normal esophageal contractility pattern with mean DCI of 2584 mmHg.s.cm LES-median IRP is within normal limits Gastroesophageal junction relative to LES measures 2.3 cm Rapid swallow index is greater than 1 [place] classification-normal esophageal motility with likely 2 cm hiatal hernia     FL barium swallow    Result Date: 12/15/2023  Narrative: BARIUM SWALLOW-ESOPHAGRAM INDICATION:   K21.9: Gastro-esophageal reflux disease without esophagitis. COMPARISON:  None IMAGES: 670 FLUOROSCOPY TIME:   1.6 minutes TECHNIQUE: The patient was given barium by mouth and images of the esophagus were obtained. FINDINGS: The esophagus is normal in caliber. Esophageal tertiary contractions are noted. No mucosal lesion, ulceration or evidence of fold thickening  is seen. Esophageal diverticulum is seen from the lower esophagus, image 6 series 10. Gastroesophageal reflux was not observed. Small hiatal hernia seen     Impression: Esophageal dysmotility with tertiary contractions there are 2 esophageal diverticula in relation to the lower esophagus Small hiatal hernia Mild delayed emptying of the esophagus Workstation performed: [id number]     EGD    Result Date: 12/12/2023  Narrative: Table formatting from the original result was not included.  Formerly [facility] [address] [facility] [id number] [phone] DATE OF SERVICE: 12/12/23 PHYSICIAN(S): Attending: Alex Montesinos DO Fellow: No Staff Documented INDICATION: Gastroesophageal reflux disease, unspecified whether esophagitis present POST-OP DIAGNOSIS: See the impression below. PREPROCEDURE: Informed consent was obtained for the procedure, including sedation.  Risks of perforation, hemorrhage, adverse drug reaction and aspiration were discussed. The patient was placed in the left lateral decubitus position. Patient was explained about the risks and benefits of the procedure. Risks including but not limited to bleeding, infection, and perforation were explained in detail. Also explained about less than 100% sensitivity with the exam and other alternatives. PROCEDURE: EGD DETAILS OF PROCEDURE: Patient was taken to the procedure room where a time out was performed to confirm correct patient and correct procedure. The patient underwent monitored anesthesia care, which was administered by an anesthesia professional. The patient's blood pressure, heart rate, level of consciousness, respirations and oxygen were monitored throughout the procedure. The scope was advanced to the second part of the duodenum. Retroflexion was performed in the fundus. The patient experienced no blood loss. The procedure was not difficult. The patient tolerated the procedure well. There were no apparent adverse events.  ANESTHESIA INFORMATION: ASA: II Anesthesia Type: Anesthesia type not filed in the log. MEDICATIONS: No administrations occurring from 0749 to 0758 on 12/12/23 FINDINGS: The cricopharynx, upper third of the esophagus, middle third of the esophagus, lower third of the esophagus, GE junction and Z-line appeared normal. Z-line is 35 cm from the incisors. Medium sliding hiatal hernia (type I hiatal hernia) without Efren lesions present - GE junction 35 cm from the incisors, diaphragmatic impression 39 cm from the incisors:  Hill classification: Grade III There is mild to moderate semiformed food in the fundus and body of the stomach that was suctioned away.  The findings were consistent with some degree of gastroparesis. The duodenal bulb and 2nd part of the duodenum appeared normal. SPECIMENS: * No specimens in log *     Impression: The cricopharynx, upper third of the esophagus, middle third of the esophagus, lower third of the esophagus, GE junction and Z-line appeared normal. Medium type I hiatal hernia The duodenal bulb and 2nd part of the duodenum appeared normal. RECOMMENDATION: 1.  Continue current medical therapy as prescribed   Alex Montesinos DO FACG, FACP   Answers submitted by the patient for this visit:  Abdominal Pain Questionnaire (Submitted on 1/4/2024)  Chief Complaint: Abdominal pain  Chronicity: chronic  Onset: more than 1 year ago  Onset quality: undetermined  Frequency: daily  Episode duration: 2 Days  Pain location: generalized abdominal region  Pain - numeric: 4/10  Pain quality: aching, cramping, a sensation of fullness, sharp  Radiates to: LLQ, LUQ, RLQ, periumbilical region, right shoulder, chest  anorexia: No  arthralgias: No  belching: Yes  constipation: Yes  diarrhea: Yes  dysuria: No  fever: No  flatus: No  frequency: No  headaches: No  hematochezia: No  hematuria: No  melena: No  myalgias: Yes  nausea: Yes  weight loss: No  vomiting: Yes  Aggravated by: bowel movement, certain positions,  coughing, eating, vomiting  Diagnostic workup: GI consult

## 2024-01-15 ENCOUNTER — TELEPHONE (OUTPATIENT)
Age: 39
End: 2024-01-15

## 2024-01-15 DIAGNOSIS — R11.2 NAUSEA AND VOMITING, UNSPECIFIED VOMITING TYPE: ICD-10-CM

## 2024-01-15 DIAGNOSIS — K21.9 GASTROESOPHAGEAL REFLUX DISEASE WITHOUT ESOPHAGITIS: Primary | ICD-10-CM

## 2024-01-15 RX ORDER — ONDANSETRON 4 MG/1
4 TABLET, FILM COATED ORAL EVERY 8 HOURS PRN
Qty: 20 TABLET | Refills: 0 | Status: CANCELLED | OUTPATIENT
Start: 2024-01-15

## 2024-01-15 RX ORDER — FAMOTIDINE 40 MG/1
40 TABLET, FILM COATED ORAL 2 TIMES DAILY PRN
Qty: 60 TABLET | Refills: 0 | Status: SHIPPED | OUTPATIENT
Start: 2024-01-15 | End: 2024-02-09

## 2024-01-15 RX ORDER — ONDANSETRON 4 MG/1
4 TABLET, FILM COATED ORAL EVERY 8 HOURS PRN
Qty: 20 TABLET | Refills: 2 | Status: SHIPPED | OUTPATIENT
Start: 2024-01-15

## 2024-01-15 NOTE — TELEPHONE ENCOUNTER
Reason for call:     Patient called was seen in the office on 1/4/24 with Pippa. Patient states Pippa was going to send a script to the pharmacy for her try but nothing has been sent yet. Patient does not know what the medication is called. Please call patient to advise.    [x] Refill   [] Prior Auth  [] Other:     Office:   [] PCP/Provider -   [x] Specialty/Provider - GEREMIAS Sen

## 2024-01-15 NOTE — TELEPHONE ENCOUNTER
Called spoke to pt. Pt reports she has been taking her medication as directed and has been vomiting and feeling nausea more than before. She has been vomiting food not foam. She is taking pantoprazole 40 mg BID. She is out of Zofran and needs a prescription sent.     Routing for approval.    Any other recommendation?

## 2024-01-15 NOTE — TELEPHONE ENCOUNTER
Will forward to Ragini to see if she has any other recs. Refilled her Zofran, and will add Pepcid for her to take at lunch and before bedtime.

## 2024-01-15 NOTE — TELEPHONE ENCOUNTER
Looked through Ragini's last note. Can you ask the patient what the medication is for? The only med I do not see which could have  is Zofran for nausea

## 2024-02-06 ENCOUNTER — NURSE TRIAGE (OUTPATIENT)
Dept: OTHER | Facility: OTHER | Age: 39
End: 2024-02-06

## 2024-02-06 ENCOUNTER — HOSPITAL ENCOUNTER (EMERGENCY)
Facility: HOSPITAL | Age: 39
Discharge: HOME | End: 2024-02-07
Attending: EMERGENCY MEDICINE
Payer: COMMERCIAL

## 2024-02-06 ENCOUNTER — HOSPITAL ENCOUNTER (OUTPATIENT)
Dept: NUCLEAR MEDICINE | Facility: HOSPITAL | Age: 39
Discharge: HOME | End: 2024-02-06
Payer: COMMERCIAL

## 2024-02-06 DIAGNOSIS — R11.0 NAUSEA: ICD-10-CM

## 2024-02-06 DIAGNOSIS — R51.9 HEADACHE: Primary | ICD-10-CM

## 2024-02-06 DIAGNOSIS — R68.81 EARLY SATIETY: ICD-10-CM

## 2024-02-06 DIAGNOSIS — K21.9 GASTROESOPHAGEAL REFLUX DISEASE, UNSPECIFIED WHETHER ESOPHAGITIS PRESENT: ICD-10-CM

## 2024-02-06 LAB
ALBUMIN SERPL BCP-MCNC: 4.3 G/DL (ref 3.5–5)
ALP SERPL-CCNC: 75 U/L (ref 34–104)
ALT SERPL W P-5'-P-CCNC: 10 U/L (ref 7–52)
ANION GAP SERPL CALCULATED.3IONS-SCNC: 8 MMOL/L
AST SERPL W P-5'-P-CCNC: 12 U/L (ref 13–39)
BACTERIA UR QL AUTO: ABNORMAL /HPF
BASOPHILS # BLD AUTO: 0.06 THOUSANDS/ÂΜL (ref 0–0.1)
BASOPHILS NFR BLD AUTO: 0 % (ref 0–1)
BILIRUB SERPL-MCNC: 0.34 MG/DL (ref 0.2–1)
BILIRUB UR QL STRIP: NEGATIVE
BUDDING YEAST: PRESENT
BUN SERPL-MCNC: 10 MG/DL (ref 5–25)
CALCIUM SERPL-MCNC: 9.3 MG/DL (ref 8.4–10.2)
CHLORIDE SERPL-SCNC: 105 MMOL/L (ref 96–108)
CLARITY UR: ABNORMAL
CO2 SERPL-SCNC: 22 MMOL/L (ref 21–32)
COLOR UR: YELLOW
CREAT SERPL-MCNC: 0.62 MG/DL (ref 0.6–1.3)
EOSINOPHIL # BLD AUTO: 0.1 THOUSAND/ÂΜL (ref 0–0.61)
EOSINOPHIL NFR BLD AUTO: 1 % (ref 0–6)
ERYTHROCYTE [DISTWIDTH] IN BLOOD BY AUTOMATED COUNT: 12.6 % (ref 11.6–15.1)
EXT PREGNANCY TEST URINE: NEGATIVE
EXT. CONTROL: NORMAL
GFR SERPL CREATININE-BSD FRML MDRD: 114 ML/MIN/1.73SQ M
GLUCOSE SERPL-MCNC: 113 MG/DL (ref 65–140)
GLUCOSE UR STRIP-MCNC: NEGATIVE MG/DL
HCT VFR BLD AUTO: 36 % (ref 34.8–46.1)
HGB BLD-MCNC: 12.2 G/DL (ref 11.5–15.4)
HGB UR QL STRIP.AUTO: NEGATIVE
IMM GRANULOCYTES # BLD AUTO: 0.04 THOUSAND/UL (ref 0–0.2)
IMM GRANULOCYTES NFR BLD AUTO: 0 % (ref 0–2)
KETONES UR STRIP-MCNC: ABNORMAL MG/DL
LEUKOCYTE ESTERASE UR QL STRIP: NEGATIVE
LYMPHOCYTES # BLD AUTO: 1.53 THOUSANDS/ÂΜL (ref 0.6–4.47)
LYMPHOCYTES NFR BLD AUTO: 10 % (ref 14–44)
MCH RBC QN AUTO: 30.3 PG (ref 26.8–34.3)
MCHC RBC AUTO-ENTMCNC: 33.9 G/DL (ref 31.4–37.4)
MCV RBC AUTO: 90 FL (ref 82–98)
MONOCYTES # BLD AUTO: 0.55 THOUSAND/ÂΜL (ref 0.17–1.22)
MONOCYTES NFR BLD AUTO: 4 % (ref 4–12)
MUCOUS THREADS UR QL AUTO: ABNORMAL
NEUTROPHILS # BLD AUTO: 12.37 THOUSANDS/ÂΜL (ref 1.85–7.62)
NEUTS SEG NFR BLD AUTO: 85 % (ref 43–75)
NITRITE UR QL STRIP: NEGATIVE
NON-SQ EPI CELLS URNS QL MICRO: ABNORMAL /HPF
NRBC BLD AUTO-RTO: 0 /100 WBCS
PH UR STRIP.AUTO: 8 [PH]
PLATELET # BLD AUTO: 389 THOUSANDS/UL (ref 149–390)
PMV BLD AUTO: 10.2 FL (ref 8.9–12.7)
POTASSIUM SERPL-SCNC: 3.5 MMOL/L (ref 3.5–5.3)
PROT SERPL-MCNC: 7.3 G/DL (ref 6.4–8.4)
PROT UR STRIP-MCNC: ABNORMAL MG/DL
RBC # BLD AUTO: 4.02 MILLION/UL (ref 3.81–5.12)
RBC #/AREA URNS AUTO: ABNORMAL /HPF
SODIUM SERPL-SCNC: 135 MMOL/L (ref 135–147)
SP GR UR STRIP.AUTO: 1.03 (ref 1–1.03)
UROBILINOGEN UR STRIP-ACNC: <2 MG/DL
WBC # BLD AUTO: 14.65 THOUSAND/UL (ref 4.31–10.16)
WBC #/AREA URNS AUTO: ABNORMAL /HPF

## 2024-02-06 PROCEDURE — 96366 THER/PROPH/DIAG IV INF ADDON: CPT

## 2024-02-06 PROCEDURE — 81001 URINALYSIS AUTO W/SCOPE: CPT | Performed by: EMERGENCY MEDICINE

## 2024-02-06 PROCEDURE — 85025 COMPLETE CBC W/AUTO DIFF WBC: CPT | Performed by: EMERGENCY MEDICINE

## 2024-02-06 PROCEDURE — 96365 THER/PROPH/DIAG IV INF INIT: CPT

## 2024-02-06 PROCEDURE — 99283 EMERGENCY DEPT VISIT LOW MDM: CPT

## 2024-02-06 PROCEDURE — A9541 TC99M SULFUR COLLOID: HCPCS

## 2024-02-06 PROCEDURE — 36415 COLL VENOUS BLD VENIPUNCTURE: CPT | Performed by: EMERGENCY MEDICINE

## 2024-02-06 PROCEDURE — 96375 TX/PRO/DX INJ NEW DRUG ADDON: CPT

## 2024-02-06 PROCEDURE — 80053 COMPREHEN METABOLIC PANEL: CPT | Performed by: EMERGENCY MEDICINE

## 2024-02-06 PROCEDURE — G1004 CDSM NDSC: HCPCS

## 2024-02-06 PROCEDURE — 81025 URINE PREGNANCY TEST: CPT | Performed by: EMERGENCY MEDICINE

## 2024-02-06 PROCEDURE — 78264 GASTRIC EMPTYING IMG STUDY: CPT

## 2024-02-06 RX ORDER — METOCLOPRAMIDE HYDROCHLORIDE 5 MG/ML
10 INJECTION INTRAMUSCULAR; INTRAVENOUS ONCE
Status: COMPLETED | OUTPATIENT
Start: 2024-02-06 | End: 2024-02-06

## 2024-02-06 RX ORDER — DIPHENHYDRAMINE HYDROCHLORIDE 50 MG/ML
25 INJECTION INTRAMUSCULAR; INTRAVENOUS ONCE
Status: COMPLETED | OUTPATIENT
Start: 2024-02-06 | End: 2024-02-06

## 2024-02-06 RX ORDER — KETOROLAC TROMETHAMINE 30 MG/ML
15 INJECTION, SOLUTION INTRAMUSCULAR; INTRAVENOUS ONCE
Status: DISCONTINUED | OUTPATIENT
Start: 2024-02-06 | End: 2024-02-07 | Stop reason: HOSPADM

## 2024-02-06 RX ORDER — MAGNESIUM SULFATE HEPTAHYDRATE 40 MG/ML
2 INJECTION, SOLUTION INTRAVENOUS ONCE
Status: COMPLETED | OUTPATIENT
Start: 2024-02-06 | End: 2024-02-07

## 2024-02-06 RX ORDER — KETOROLAC TROMETHAMINE 30 MG/ML
15 INJECTION, SOLUTION INTRAMUSCULAR; INTRAVENOUS ONCE
Status: COMPLETED | OUTPATIENT
Start: 2024-02-06 | End: 2024-02-06

## 2024-02-06 RX ADMIN — MAGNESIUM SULFATE HEPTAHYDRATE 2 G: 40 INJECTION, SOLUTION INTRAVENOUS at 22:25

## 2024-02-06 RX ADMIN — METOCLOPRAMIDE 10 MG: 5 INJECTION, SOLUTION INTRAMUSCULAR; INTRAVENOUS at 22:25

## 2024-02-06 RX ADMIN — SODIUM CHLORIDE 1000 ML: 0.9 INJECTION, SOLUTION INTRAVENOUS at 22:24

## 2024-02-06 RX ADMIN — KETOROLAC TROMETHAMINE 15 MG: 30 INJECTION, SOLUTION INTRAMUSCULAR; INTRAVENOUS at 22:25

## 2024-02-06 RX ADMIN — DIPHENHYDRAMINE HYDROCHLORIDE 25 MG: 50 INJECTION, SOLUTION INTRAMUSCULAR; INTRAVENOUS at 22:25

## 2024-02-07 VITALS
OXYGEN SATURATION: 99 % | HEIGHT: 62 IN | BODY MASS INDEX: 25.4 KG/M2 | TEMPERATURE: 97.7 F | DIASTOLIC BLOOD PRESSURE: 70 MMHG | SYSTOLIC BLOOD PRESSURE: 116 MMHG | HEART RATE: 93 BPM | RESPIRATION RATE: 18 BRPM | WEIGHT: 138 LBS

## 2024-02-07 PROCEDURE — 99284 EMERGENCY DEPT VISIT MOD MDM: CPT | Performed by: EMERGENCY MEDICINE

## 2024-02-07 NOTE — TELEPHONE ENCOUNTER
"Reason for Disposition  • [1] Vomiting AND [2] persists > 4 hours  • [1] SEVERE headache AND [2] sudden-onset (i.e., reaching maximum intensity within seconds to 1 hour)    Additional Information  • Negative: Patient sounds very sick or weak to the triager    Answer Assessment - Initial Assessment Questions  1. SYMPTOM: \"What's the main symptom you're concerned about?\" (e.g., pain, fever, vomiting)      Nausea, headache, vomiting  2. ONSET: \"When did symptoms  start?\"      It started right after the procedure  3. SURGERY: \"What surgery was performed?\"      Gastric emptying  4. DATE of SURGERY: \"When was surgery performed?\"       2/6/2024  5. ANESTHESIA: \" What type of anesthesia did you have?\" (e.g., general, spinal, epidural, local)      No anesthesia  6. PAIN: \"Is there any pain?\" If Yes, ask: \"How bad is it?\"  (Scale 1-10; or mild, moderate, severe)      She has headache  9/10 with motrin   7. FEVER: \"Do you have a fever?\" If Yes, ask: \"What is your temperature, how was it measured, and when did it start?\"      none  8. VOMITING: \"Is there any vomiting?\" If yes, ask: \"How many times?\"      Vomited 2 times today. The color is pink.  9. BLEEDING: \"Is there any bleeding?\" If Yes, ask: \"How much?\" and \"Where?\"      none  10. OTHER SYMPTOMS: \"Do you have any other symptoms?\" (e.g., drainage from wound, painful urination, constipation)        Nausea with zofran , facial numbness/tingling in cheeks and mouth 30 minutes ago.Her fingers also feel tingly.    Protocols used: Post-Op Symptoms and Questions-ADULT-AH, Headache-ADULT-AH    "

## 2024-02-07 NOTE — TELEPHONE ENCOUNTER
"Regarding: post procedure issues/migraine/vomiting /nausea/ numbness of the mouth  ----- Message from Diana Lizarraga sent at 2/6/2024  9:05 PM EST -----  I had a procedure done today. I have a migraine, vomiting , nausea, I also have numbness of the mouth . I took motrin and the nausea medication given to me. I have not been feeling good since procedure and I am not sure what to do\"    "

## 2024-02-09 DIAGNOSIS — R11.2 NAUSEA AND VOMITING, UNSPECIFIED VOMITING TYPE: ICD-10-CM

## 2024-02-09 DIAGNOSIS — K21.9 GASTROESOPHAGEAL REFLUX DISEASE WITHOUT ESOPHAGITIS: ICD-10-CM

## 2024-02-09 DIAGNOSIS — R11.2 NAUSEA AND VOMITING, UNSPECIFIED VOMITING TYPE: Primary | ICD-10-CM

## 2024-02-09 RX ORDER — FAMOTIDINE 40 MG/1
TABLET, FILM COATED ORAL
Qty: 60 TABLET | Refills: 5 | Status: SHIPPED | OUTPATIENT
Start: 2024-02-09

## 2024-02-09 RX ORDER — METOCLOPRAMIDE 5 MG/1
5 TABLET ORAL 3 TIMES DAILY
Qty: 90 TABLET | Refills: 3 | Status: SHIPPED | OUTPATIENT
Start: 2024-02-09

## 2024-02-24 NOTE — ED PROVIDER NOTES
"History  Chief Complaint   Patient presents with    Headache     Pt reports \"I had a gastric emptying done today and I started having a migraine during the procedure.\" Pt reports 2 episodes of vomiting, also had tingling in her fingers which has resolved      39-year-old female presents emergency department for evaluation of headache.  Patient started having headache after having a gastric emptying study done today.  Vomited twice, also bilateral finger tingling which has since resolved.  No visual changes no fevers no neck stiffness no numbness weakness or tingling otherwise.        Prior to Admission Medications   Prescriptions Last Dose Informant Patient Reported? Taking?   Multiple Vitamins-Minerals (HAIR SKIN AND NAILS FORMULA PO)  Self Yes No   Sig: Take by mouth   albuterol (PROVENTIL HFA,VENTOLIN HFA) 90 mcg/act inhaler  Self Yes No   Sig: INHALE 2 PUFFS EVERY 6 HOURS AS NEEDED FOR WHEEZING   amitriptyline (ELAVIL) 150 MG tablet  Self Yes No   Sig: TAKE 1 TABLET BY MOUTH EVERY DAY AT NIGHT   dicyclomine (BENTYL) 20 mg tablet  Self No No   Sig: TAKE 1 TABLET (20 MG TOTAL) BY MOUTH EVERY 6 (SIX) HOURS AS NEEDED (ABDOMINAL PAIN)   ondansetron (ZOFRAN) 4 mg tablet   No No   Sig: Take 1 tablet (4 mg total) by mouth every 8 (eight) hours as needed for nausea or vomiting   other medication, see sig,  Self Yes No   Sig: Medical Marijuana   pantoprazole (PROTONIX) 40 mg tablet  Self No No   Sig: Take 1 tablet (40 mg total) by mouth 2 (two) times a day      Facility-Administered Medications: None       Past Medical History:   Diagnosis Date    Anxiety     Asthma     Breast cyst, left     Cancer (HCC) 2019    colon    Depression     Disease of thyroid gland     Pt hasn't seen physician regarding this since 2009    Irritable bowel syndrome     Uterine fibroid        Past Surgical History:   Procedure Laterality Date    ABDOMINAL SURGERY      COLONOSCOPY      CYSTOSCOPY N/A 10/21/2020    Procedure: CYSTOSCOPY;  Surgeon: " Alaina Beal MD;  Location: [facility];  Service: Gynecology    HEMORRHOID SURGERY      HYSTERECTOMY      IR PICC PLACEMENT SINGLE LUMEN  10/30/2020    NV LAPAROSCOPY COLECTOMY PARTIAL W/ANASTOMOSIS N/A 2019    Procedure: ROBOTIC ASSISTED ILEOCOLECTOMY;  Surgeon: NICK Terry MD;  Location: [facility];  Service: Robotics    NV LAPS TOTAL HYSTERECT 250 GM/< W/RMVL TUBE/OVARY Bilateral 10/21/2020    Procedure: ROBOTIC ASSISTED LAPAROSCOPIC HYSTERECTOMY BILATERAL SALPINGECTOMY;  Surgeon: Alaina Beal MD;  Location: [facility];  Service: Gynecology    UPPER ENDOSCOPY W/ ESOPHAGEAL MANOMETRY  2023       Family History   Problem Relation Age of Onset    Thyroid cancer Mother     Diabetes Father     Heart disease Father     Colon polyps Maternal Uncle     Breast cancer Maternal Grandmother 47    Prostate cancer Paternal Grandfather     Pancreatic cancer Paternal Grandfather     Colon polyps Paternal Aunt     No Known Problems Sister     No Known Problems Brother     No Known Problems Daughter     No Known Problems Brother     No Known Problems Daughter     Colon cancer Neg Hx     Ovarian cancer Neg Hx     Uterine cancer Neg Hx     Cervical cancer Neg Hx      I have reviewed and agree with the history as documented.    E-Cigarette/Vaping    E-Cigarette Use Current Every Day User     Comments medical marijuana      E-Cigarette/Vaping Substances    Nicotine No     THC No     CBD No     Flavoring No     Other No     Unknown No      Social History     Tobacco Use    Smoking status: Former     Current packs/day: 0.00     Types: Cigarettes     Quit date:      Years since quittin.1    Smokeless tobacco: Never   Vaping Use    Vaping status: Every Day   Substance Use Topics    Alcohol use: Yes     Alcohol/week: 1.0 standard drink of alcohol     Types: 1 Glasses of wine per week     Comment: occasionally    Drug use: Yes     Frequency: 7.0 times per week     Types: Marijuana     Comment: medical marijuana        Review of Systems   Constitutional:  Negative for appetite change, chills, fatigue and fever.   HENT:  Negative for sneezing and sore throat.    Eyes:  Negative for visual disturbance.   Respiratory:  Negative for cough, choking, chest tightness, shortness of breath and wheezing.    Cardiovascular:  Negative for chest pain and palpitations.   Gastrointestinal:  Positive for nausea. Negative for abdominal pain, constipation, diarrhea and vomiting.   Genitourinary:  Negative for difficulty urinating and dysuria.   Neurological:  Positive for headaches. Negative for dizziness, weakness, light-headedness and numbness.   All other systems reviewed and are negative.      Physical Exam  Physical Exam  Constitutional:       General: She is not in acute distress.     Appearance: She is well-developed. She is not diaphoretic.   HENT:      Head: Normocephalic and atraumatic.   Eyes:      Pupils: Pupils are equal, round, and reactive to light.   Neck:      Vascular: No JVD.      Trachea: No tracheal deviation.   Cardiovascular:      Rate and Rhythm: Normal rate and regular rhythm.      Heart sounds: Normal heart sounds. No murmur heard.     No friction rub. No gallop.   Pulmonary:      Effort: Pulmonary effort is normal. No respiratory distress.      Breath sounds: Normal breath sounds. No wheezing or rales.   Abdominal:      General: Bowel sounds are normal. There is no distension.      Palpations: Abdomen is soft.      Tenderness: There is no abdominal tenderness. There is no guarding or rebound.   Skin:     General: Skin is warm and dry.      Coloration: Skin is not pale.   Neurological:      Mental Status: She is alert and oriented to person, place, and time.      Cranial Nerves: No cranial nerve deficit.      Motor: No abnormal muscle tone.   Psychiatric:         Behavior: Behavior normal.         Vital Signs  ED Triage Vitals [02/06/24 2154]   Temperature Pulse Respirations Blood Pressure SpO2   97.7 °F (36.5 °C)  (!) 106 18 143/72 98 %      Temp Source Heart Rate Source Patient Position - Orthostatic VS BP Location FiO2 (%)   Temporal Monitor Sitting Left arm --      Pain Score       --           Vitals:    02/06/24 2154 02/06/24 2200 02/07/24 0000   BP: 143/72 116/75 116/70   Pulse: (!) 106 103 93   Patient Position - Orthostatic VS: Sitting  Sitting         Visual Acuity      ED Medications  Medications   sodium chloride 0.9 % bolus 1,000 mL (0 mL Intravenous Stopped 2/7/24 0010)   ketorolac (TORADOL) injection 15 mg (15 mg Intravenous Given 2/6/24 2225)   magnesium sulfate 2 g/50 mL IVPB (premix) 2 g (0 g Intravenous Stopped 2/7/24 0010)   metoclopramide (REGLAN) injection 10 mg (10 mg Intravenous Given 2/6/24 2225)   diphenhydrAMINE (BENADRYL) injection 25 mg (25 mg Intravenous Given 2/6/24 2225)       Diagnostic Studies  Results Reviewed       Procedure Component Value Units Date/Time    Comprehensive metabolic panel [[id number]]  (Abnormal) Collected: 02/06/24 2231    Lab Status: Final result Specimen: Blood from Arm, Left Updated: 02/06/24 2251     Sodium 135 mmol/L      Potassium 3.5 mmol/L      Chloride 105 mmol/L      CO2 22 mmol/L      ANION GAP 8 mmol/L      BUN 10 mg/dL      Creatinine 0.62 mg/dL      Glucose 113 mg/dL      Calcium 9.3 mg/dL      AST 12 U/L      ALT 10 U/L      Alkaline Phosphatase 75 U/L      Total Protein 7.3 g/dL      Albumin 4.3 g/dL      Total Bilirubin 0.34 mg/dL      eGFR 114 ml/min/1.73sq m     Narrative:      National Kidney Disease Foundation guidelines for Chronic Kidney Disease (CKD):     Stage 1 with normal or high GFR (GFR > 90 mL/min/1.73 square meters)    Stage 2 Mild CKD (GFR = 60-89 mL/min/1.73 square meters)    Stage 3A Moderate CKD (GFR = 45-59 mL/min/1.73 square meters)    Stage 3B Moderate CKD (GFR = 30-44 mL/min/1.73 square meters)    Stage 4 Severe CKD (GFR = 15-29 mL/min/1.73 square meters)    Stage 5 End Stage CKD (GFR <15 mL/min/1.73 square meters)  Note: GFR  calculation is accurate only with a steady state creatinine    Urine Microscopic [[id number]]  (Abnormal) Collected: 02/06/24 2232    Lab Status: Final result Specimen: Urine, Clean Catch Updated: 02/06/24 2243     RBC, UA 4-10 /hpf      WBC, UA 2-4 /hpf      Epithelial Cells Occasional /hpf      Bacteria, UA Occasional /hpf      MUCUS THREADS Occasional     Budding Yeast Present    UA w Reflex to Microscopic w Reflex to Culture [[id number]]  (Abnormal) Collected: 02/06/24 2232    Lab Status: Final result Specimen: Urine, Clean Catch Updated: 02/06/24 2239     Color, UA Yellow     Clarity, UA Turbid     Specific Gravity, UA 1.026     pH, UA 8.0     Leukocytes, UA Negative     Nitrite, UA Negative     Protein, UA Trace mg/dl      Glucose, UA Negative mg/dl      Ketones, UA 10 (1+) mg/dl      Urobilinogen, UA <2.0 mg/dl      Bilirubin, UA Negative     Occult Blood, UA Negative    CBC and differential [[id number]]  (Abnormal) Collected: 02/06/24 2231    Lab Status: Final result Specimen: Blood from Arm, Left Updated: 02/06/24 2236     WBC 14.65 Thousand/uL      RBC 4.02 Million/uL      Hemoglobin 12.2 g/dL      Hematocrit 36.0 %      MCV 90 fL      MCH 30.3 pg      MCHC 33.9 g/dL      RDW 12.6 %      MPV 10.2 fL      Platelets 389 Thousands/uL      nRBC 0 /100 WBCs      Neutrophils Relative 85 %      Immat GRANS % 0 %      Lymphocytes Relative 10 %      Monocytes Relative 4 %      Eosinophils Relative 1 %      Basophils Relative 0 %      Neutrophils Absolute 12.37 Thousands/µL      Immature Grans Absolute 0.04 Thousand/uL      Lymphocytes Absolute 1.53 Thousands/µL      Monocytes Absolute 0.55 Thousand/µL      Eosinophils Absolute 0.10 Thousand/µL      Basophils Absolute 0.06 Thousands/µL     POCT pregnancy, urine [[id number]]  (Normal) Resulted: 02/06/24 2225    Lab Status: Final result Updated: 02/06/24 2225     EXT Preg Test, Ur Negative     Control Valid                   No orders to display               Procedures  Procedures         ED Course                               SBIRT 22yo+      Flowsheet Row Most Recent Value   Initial Alcohol Screen: US AUDIT-C     1. How often do you have a drink containing alcohol? 0 Filed at: 02/06/2024 2158   2. How many drinks containing alcohol do you have on a typical day you are drinking?  0 Filed at: 02/06/2024 2158   3a. Male UNDER 65: How often do you have five or more drinks on one occasion? 0 Filed at: 02/06/2024 2158   3b. FEMALE Any Age, or MALE 65+: How often do you have 4 or more drinks on one occassion? 0 Filed at: 02/06/2024 2158   Audit-C Score 0 Filed at: 02/06/2024 2158   MARICARMEN: How many times in the past year have you...    Used an illegal drug or used a prescription medication for non-medical reasons? Never Filed at: 02/06/2024 2158                      Medical Decision Making  39-year-old female with headache, will give migraine cocktail, reassess.    Amount and/or Complexity of Data Reviewed  Labs: ordered.    Risk  Prescription drug management.             Disposition  Final diagnoses:   Headache     Time reflects when diagnosis was documented in both MDM as applicable and the Disposition within this note       Time User Action Codes Description Comment    2/7/2024 12:08 AM Alex Herrera Add [R51.9] Headache           ED Disposition       ED Disposition   Discharge    Condition   Stable    Date/Time   Wed Feb 7, 2024 0008    Comment   Elizabeth Shahr discharge to home/self care.                   Follow-up Information       Follow up With Specialties Details Why Contact Info    Raquel Clark MD Family Medicine   [address] [place] [id number] [phone]              Discharge Medication List as of 2/7/2024 12:08 AM        CONTINUE these medications which have NOT CHANGED    Details   albuterol (PROVENTIL HFA,VENTOLIN HFA) 90 mcg/act inhaler INHALE 2 PUFFS EVERY 6 HOURS AS NEEDED FOR WHEEZING, Historical Med      amitriptyline  (ELAVIL) 150 MG tablet TAKE 1 TABLET BY MOUTH EVERY DAY AT NIGHT, Historical Med      dicyclomine (BENTYL) 20 mg tablet TAKE 1 TABLET (20 MG TOTAL) BY MOUTH EVERY 6 (SIX) HOURS AS NEEDED (ABDOMINAL PAIN), Starting Fri 11/10/2023, Normal      Multiple Vitamins-Minerals (HAIR SKIN AND NAILS FORMULA PO) Take by mouth, Historical Med      ondansetron (ZOFRAN) 4 mg tablet Take 1 tablet (4 mg total) by mouth every 8 (eight) hours as needed for nausea or vomiting, Starting Mon 1/15/2024, Normal      other medication, see sig, Medical Marijuana, Historical Med      pantoprazole (PROTONIX) 40 mg tablet Take 1 tablet (40 mg total) by mouth 2 (two) times a day, Starting Thu 11/30/2023, Normal      famotidine (PEPCID) 40 MG tablet Take 1 tablet (40 mg total) by mouth 2 (two) times a day as needed for heartburn, Starting Mon 1/15/2024, Normal             No discharge procedures on file.    PDMP Review         Value Time User    PDMP Reviewed  Yes 10/21/2020  3:11 PM Alaina Beal MD            ED Provider  Electronically Signed by             Alex Herrera MD  02/23/24 [id number]

## 2024-03-21 NOTE — PROGRESS NOTES
Pt presents for venofer infusion offering no complaints  Pt tolerated treatment without incident  PIV removed  AVS declined, next appointment reviewed 
Patient/Caregiver provided printed discharge information.

## 2024-04-22 ENCOUNTER — TELEPHONE (OUTPATIENT)
Dept: GASTROENTEROLOGY | Facility: CLINIC | Age: 39
End: 2024-04-22

## 2024-09-20 ENCOUNTER — NURSE TRIAGE (OUTPATIENT)
Age: 39
End: 2024-09-20

## 2024-09-20 NOTE — TELEPHONE ENCOUNTER
"Please advise  Last OV: 1/4/24   Hx: GERD, nausea       Pt transferred to nurses line.    Pt reports she had a BM today and noticed a large worm- pt explains it was 100% a worm and is concerned. Pt has a nausea feeling- explains it as feeling of \"starving\" she is on probiotic daily and drinking plenty of fluids. Pt does have some rectal itching but no blood in stool.     Last two days having small BM once a day but explains she usually have few soft BM a day.     Pt is scheduled for OV on Monday- please advise if you would like to order stool studies for pt in the meantime?       Reason for Disposition  • Mild rectal itching    Answer Assessment - Initial Assessment Questions  1. SYMPTOM:  \"What's the main symptom you're concerned about?\" (e.g., pain, itching, swelling, rash)      Worm in stool  2. ONSET: \"When did the worm  start?\"      Today   3. RECTAL PAIN: \"Do you have any pain around your rectum?\" \"How bad is the pain?\"  (Scale 1-10; or mild, moderate, severe)   - MILD (1-3): doesn't interfere with normal activities    - MODERATE (4-7): interferes with normal activities or awakens from sleep, limping    - SEVERE (8-10): excruciating pain, unable to have a bowel movement       0  4. RECTAL ITCHING: \"Do you have any itching in this area?\" \"How bad is the itching?\"  (Scale 1-10; or mild, moderate, severe)   - MILD - doesn't interfere with normal activities    - MODERATE-SEVERE: interferes with normal activities or awakens from sleep      mild  5. CONSTIPATION: \"Do you have constipation?\" If Yes, ask: \"How bad is it?\"      no  6. CAUSE: \"What do you think is causing the anus symptoms?\"        7. OTHER SYMPTOMS: \"Do you have any other symptoms?\"  (e.g., rectal bleeding, abdominal pain, vomiting, fever)    Protocols used: Rectal Symptoms-ADULT-[place]    "

## 2024-10-17 ENCOUNTER — TELEPHONE (OUTPATIENT)
Age: 39
End: 2024-10-17

## 2024-10-17 NOTE — TELEPHONE ENCOUNTER
Updated pt telephone number as her old number now belongs to someone else and he was receiving her text confirmations.

## 2024-11-07 ENCOUNTER — OFFICE VISIT (OUTPATIENT)
Dept: GASTROENTEROLOGY | Facility: CLINIC | Age: 39
End: 2024-11-07
Payer: COMMERCIAL

## 2024-11-07 VITALS
DIASTOLIC BLOOD PRESSURE: 80 MMHG | HEIGHT: 62 IN | SYSTOLIC BLOOD PRESSURE: 100 MMHG | TEMPERATURE: 97.4 F | OXYGEN SATURATION: 99 % | BODY MASS INDEX: 23.37 KG/M2 | WEIGHT: 127 LBS | HEART RATE: 80 BPM

## 2024-11-07 DIAGNOSIS — R63.4 WEIGHT LOSS, UNINTENTIONAL: ICD-10-CM

## 2024-11-07 DIAGNOSIS — K21.9 GASTROESOPHAGEAL REFLUX DISEASE, UNSPECIFIED WHETHER ESOPHAGITIS PRESENT: ICD-10-CM

## 2024-11-07 DIAGNOSIS — Z85.038 HISTORY OF COLON CANCER: ICD-10-CM

## 2024-11-07 DIAGNOSIS — R11.2 NAUSEA AND VOMITING, UNSPECIFIED VOMITING TYPE: ICD-10-CM

## 2024-11-07 DIAGNOSIS — K58.2 IRRITABLE BOWEL SYNDROME WITH BOTH CONSTIPATION AND DIARRHEA: ICD-10-CM

## 2024-11-07 DIAGNOSIS — E55.9 VITAMIN D DEFICIENCY: ICD-10-CM

## 2024-11-07 DIAGNOSIS — K44.9 HIATAL HERNIA: ICD-10-CM

## 2024-11-07 DIAGNOSIS — K31.84 GASTROPARESIS: Primary | ICD-10-CM

## 2024-11-07 DIAGNOSIS — R19.7 DIARRHEA OF PRESUMED INFECTIOUS ORIGIN: ICD-10-CM

## 2024-11-07 PROCEDURE — 99213 OFFICE O/P EST LOW 20 MIN: CPT | Performed by: PHYSICIAN ASSISTANT

## 2024-11-07 RX ORDER — METOCLOPRAMIDE 5 MG/1
5 TABLET ORAL 3 TIMES DAILY
Qty: 90 TABLET | Refills: 3 | Status: SHIPPED | OUTPATIENT
Start: 2024-11-07

## 2024-11-07 NOTE — PROGRESS NOTES
Ambulatory Visit  Name: Elizabeth Youngblood      : 1985      MRN: [id number]  Encounter Provider: Ragini Sen PA-C  Encounter Date: 2024   Encounter department: [facility] [place]    Assessment & Plan  Gastroparesis    Nausea and vomiting, unspecified vomiting type  Chronic and recurrent  EGD 23 with a medium sliding hiatal hernia and mild to moderate semiformed food in the fundus and body of the stomach  GES 24 with T-1/2 200 minutes  She maintains on Pantoprazole and Famotidine    Start Reglan 5mg TID  Discussed risk of tardive dyskinesia  Reviewed gastroparesis diet       Gastroesophageal reflux disease, unspecified whether esophagitis present    Hiatal hernia  EGD 23 with moderate sized hiatal hernia  UGI 23 with esophageal dysmotility and small hiatal hernia  Esophageal manometry 24 with 10 out of 10 swallows with normal esophageal contractility and 2 cm hiatal hernia  We had considered repair of her hernia but will hold off given new finding of gastroparesis       Irritable bowel syndrome with both constipation and diarrhea  She continues to fluctuate between diarrhea, constipation, normal  She has pain and bloating  She recently restated a probiotic from [facility]  Advised to restart Benefiber 1 packet daily  She maintains a mostly low FODMAP diet  Dicyclomine and peppermint oil PRN       Weight loss, unintentional  Recent 10lb unintentional weight loss  She did start a new job at [employer]  Will check labs and f/u weight in 6 weeks  Consider need for updated imaging  History of colon cancer  Dx in  with mass in the hepatic flexure with pathology noting adenoma with HGD  This was resected in 2019 without the need for adjuvant therapy  Colonoscopy in ,  and  without evidence of recurrence  Repeat colonoscopy in  routinely       Diarrhea of presumed infectious origin  She believes she passed a worm  "recently  Will plan ova/parasite testing      History of Present Illness     Elizabeth Youngblood is a 39 y.o. female with a history of colon cancer, irritable bowel syndrome, GERD, hiatal hernia, recent diagnosis of gastroparesis, anxiety, asthma who presents for routine follow-up.  At her last appointment her biggest complaint had been nausea and vomiting.  She had known acid reflux with a hiatal hernia.  We had been pursuing a possible hiatal hernia repair and in workup of this she underwent an EGD in December 2023 with noted food retention in her stomach.  Because of this she was sent for gastric emptying study which was performed on February 6 noting a T1 half of 200 minutes.  Unfortunately, due to various social issues she was unable to follow-up after that test until now.  She continues with the same symptoms.  She reports daily nausea, vomiting, fullness, pain, bloating and ongoing irregular bowel movements with fluctuations between diarrhea and constipation.  She recently started a new job at [employer].  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  Because of this she believes that she has been eating more healthy.  She has access to their food on a daily basis.  She has had no recent rectal bleeding.  She has lost about 10 pounds which she reports is unexpected.  She continues to maintain on pantoprazole and famotidine and uses dicyclomine as needed.  She continues to maintain a mostly low FODMAP diet.  She recently restarted a probiotic.  She is not on a fiber supplement.      Review of Systems        Objective     /80 (BP Location: Left arm, Patient Position: Sitting, Cuff Size: Standard)   Pulse 80   Temp (!) 97.4 °F (36.3 °C) (Temporal)   Ht 5' 2\" (1.575 m)   Wt 57.6 kg (127 lb)   LMP 09/04/2020 (Exact Date)   SpO2 99%   BMI 23.23 kg/m²     Physical Exam    "

## 2024-11-07 NOTE — ASSESSMENT & PLAN NOTE
Dx in 2019 with mass in the hepatic flexure with pathology noting adenoma with HGD  This was resected in November of 2019 without the need for adjuvant therapy  Colonoscopy in 2020, 2021 and 2023 without evidence of recurrence  Repeat colonoscopy in 2026 routinely

## 2024-12-19 ENCOUNTER — TELEMEDICINE (OUTPATIENT)
Dept: GASTROENTEROLOGY | Facility: CLINIC | Age: 39
End: 2024-12-19
Payer: COMMERCIAL

## 2024-12-19 VITALS — HEIGHT: 62 IN | BODY MASS INDEX: 23.74 KG/M2 | WEIGHT: 129 LBS

## 2024-12-19 DIAGNOSIS — K44.9 HIATAL HERNIA: ICD-10-CM

## 2024-12-19 DIAGNOSIS — K31.84 GASTROPARESIS: Primary | ICD-10-CM

## 2024-12-19 DIAGNOSIS — Z85.038 HISTORY OF COLON CANCER: ICD-10-CM

## 2024-12-19 DIAGNOSIS — R11.2 NAUSEA AND VOMITING, UNSPECIFIED VOMITING TYPE: ICD-10-CM

## 2024-12-19 DIAGNOSIS — K58.2 IRRITABLE BOWEL SYNDROME WITH BOTH CONSTIPATION AND DIARRHEA: ICD-10-CM

## 2024-12-19 DIAGNOSIS — K21.9 GASTROESOPHAGEAL REFLUX DISEASE, UNSPECIFIED WHETHER ESOPHAGITIS PRESENT: ICD-10-CM

## 2024-12-19 PROCEDURE — 99213 OFFICE O/P EST LOW 20 MIN: CPT | Performed by: PHYSICIAN ASSISTANT

## 2024-12-19 NOTE — PROGRESS NOTES
Virtual Regular Visit  Name: Elizabeth Youngblood      : 1985      MRN: [id number]  Encounter Provider: Ragini Sen PA-C  Encounter Date: 2024   Encounter department: [facility] [place]      Verification of patient location:  Patient is located at Home in the following state in which I hold an active license [place] :    Assessment & Plan  Gastroparesis  Symptoms improved on Metoclopramide 5mg QID  She is eating small portions  Her weight is 122 but has stabilized over the past few weeks       Nausea and vomiting, unspecified vomiting type  Her daily nausea have improved but she notes consistent nausea when lying down  She is not eating close to bedtime  She is no longer taking Pantoprazole - will restart       Gastroesophageal reflux disease, unspecified whether esophagitis present  Hiatal hernia  Restart Pantoprazole       Irritable bowel syndrome with both constipation and diarrhea  She continues to fluctuate between diarrhea, constipation, normal  She has pain and bloating  She recently restated a probiotic from [facility]  Benefiber 1 packet daily  She maintains a mostly low FODMAP diet  Dicyclomine and peppermint oil PRN    History of colon cancer  Diagnosed in  with a mass that is applied at flexure and pathology noting adenoma with high-grade dysplasia  This was resected in 2019 without the need for adjuvant therapy  Colonoscopy in ,  and  without evidence of recurrence  Will likely plan to repeat in  unless symptoms become more severe over the next few months        Encounter provider Ragini Sen PA-C    The patient was identified by name and date of birth. Elizabeth Youngblood was informed that this is a telemedicine visit and that the visit is being conducted through the Epic Embedded platform. She agrees to proceed..  My office door was closed. No one else was in the room.  She acknowledged consent and understanding of privacy and  security of the video platform. The patient has agreed to participate and understands they can discontinue the visit at any time.    Patient is aware this is a billable service.     History of Present Illness     HPI 39-year-old female with a history of colon cancer diagnosed in 2019 status post resection without the need for adjuvant therapy with negative colonoscopies x 3 since as well as a history of GERD, gastroparesis and irritable bowel syndrome who presents for routine follow-up.  Her gastroparesis was diagnosed earlier this year with a gastric emptying study documenting a T1 half of 200 minutes.  She was started on Reglan 5 mg 4 times daily at her last appointment in November.  She reports that this is really helping.  She continues to eat very small portions.  There was some concern about weight loss at her last appointment but this has since stabilized.  She reports that her most recent weight is 122 which she has been this way for several weeks.  She is still working overnights at [employer].  She reports that although she does not have nausea during the day she does have nausea as soon as she lays down to go to sleep.  She is not eating close to bedtime.  She is no longer taking her pantoprazole.  She is not experiencing heartburn but does have some regurgitation.  She has no hematemesis.  She continues to fluctuate between diarrhea and constipation.  Diarrhea has always been her bigger complaint.  At her last appointment labs were ordered but she has not had these completed.    Review of Systems   Constitutional:  Positive for appetite change and fatigue. Negative for activity change, fever and unexpected weight change.   Respiratory:  Negative for cough, shortness of breath and wheezing.    Gastrointestinal:  Positive for abdominal pain, constipation, diarrhea and nausea. Negative for abdominal distention, blood in stool and vomiting.   Endocrine: Negative for cold intolerance and heat intolerance.    Genitourinary:  Negative for dysuria, flank pain and hematuria.   Neurological:  Positive for headaches. Negative for dizziness and numbness.       Objective   LMP 09/04/2020 (Exact Date)         Visit Time  Total Visit Duration: 10 minutes

## 2025-01-08 DIAGNOSIS — K58.0 IRRITABLE BOWEL SYNDROME WITH DIARRHEA: ICD-10-CM

## 2025-01-09 RX ORDER — DICYCLOMINE HCL 20 MG
TABLET ORAL
Qty: 45 TABLET | Refills: 3 | Status: SHIPPED | OUTPATIENT
Start: 2025-01-09

## (undated) DEVICE — Device

## (undated) DEVICE — CLAMP TOWEL TUBING DISPOSABLE

## (undated) DEVICE — VESSEL SEALER EXTEND: Brand: ENDOWRIST

## (undated) DEVICE — SPONGE 4 X 4 XRAY 16 PLY STRL LF RFD

## (undated) DEVICE — CHLORHEXIDINE 4PCT 4 OZ

## (undated) DEVICE — ANTI-FOG SOLUTION WITH FOAM PAD: Brand: DEVON

## (undated) DEVICE — KIT ROBOT DRAPE DISP ACCESSORY KIT 3-ARM

## (undated) DEVICE — PREMIUM DRY TRAY LF: Brand: MEDLINE INDUSTRIES, INC.

## (undated) DEVICE — SEAL

## (undated) DEVICE — SUT VICRYL 2-0 REEL 54 IN J286G

## (undated) DEVICE — IRRIG ENDO FLO TUBING

## (undated) DEVICE — BIPOLAR CORD DISP

## (undated) DEVICE — KERLIX BANDAGE ROLL: Brand: KERLIX

## (undated) DEVICE — SUT PDS II 1 CTX 36 IN Z371T

## (undated) DEVICE — TOWEL SURG XR DETECT GREEN STRL RFD

## (undated) DEVICE — 40595 XL TRENDELENBURG POSITIONING KIT: Brand: 40595 XL TRENDELENBURG POSITIONING KIT

## (undated) DEVICE — TIP COVER ACCESSORY

## (undated) DEVICE — MAYO STAND COVER: Brand: CONVERTORS

## (undated) DEVICE — PAD GROUNDING ADULT

## (undated) DEVICE — ENDOPATH PNEUMONEEDLE INSUFFLATION NEEDLES WITH LUER LOCK CONNECTORS 120MM: Brand: ENDOPATH

## (undated) DEVICE — 3000CC GUARDIAN II: Brand: GUARDIAN

## (undated) DEVICE — TRAY FOLEY 16FR URIMETER SILICONE SURESTEP

## (undated) DEVICE — KIT, BETHLEHEM ROBOTIC PROST: Brand: CARDINAL HEALTH

## (undated) DEVICE — AIRSEAL TUBE SMOKE EVAC LUMENX3 FILTERED

## (undated) DEVICE — BETHLEHEM MAJOR GENERAL PACK: Brand: CARDINAL HEALTH

## (undated) DEVICE — TIP-UP FENESTRATED GRASPER: Brand: ENDOWRIST

## (undated) DEVICE — FENESTRATED BIPOLAR FORCEPS: Brand: ENDOWRIST;DAVINCI SI

## (undated) DEVICE — STAPLER 60 RELOAD BLUE: Brand: SUREFORM

## (undated) DEVICE — COLUMN DRAPE

## (undated) DEVICE — MONOPOLAR CURVED SCISSORS: Brand: ENDOWRIST;DAVINCI SI

## (undated) DEVICE — VESSEL SEALER: Brand: ENDOWRIST;DAVINCI SI

## (undated) DEVICE — LIGHT HANDLE COVER SLEEVE DISP BLUE STELLAR

## (undated) DEVICE — HEAVY DUTY TABLE COVER: Brand: CONVERTORS

## (undated) DEVICE — DRAPE SHEET THREE QUARTER

## (undated) DEVICE — SYRINGE 10ML LL

## (undated) DEVICE — INTENDED FOR TISSUE SEPARATION, AND OTHER PROCEDURES THAT REQUIRE A SHARP SURGICAL BLADE TO PUNCTURE OR CUT.: Brand: BARD-PARKER SAFETY BLADES SIZE 11, STERILE

## (undated) DEVICE — INSUFLATION TUBING INSUFLOW (LEXION)

## (undated) DEVICE — SUT MONOCRYL 4-0 PS-2 18 IN Y496G

## (undated) DEVICE — TRAY FOLEY 16FR URIMETER SURESTEP

## (undated) DEVICE — NEEDLE 25G X 1 1/2

## (undated) DEVICE — ROBOT INST CANNULA 8MM OBTURATOR BLUNT DISP

## (undated) DEVICE — CYSTO TUBING SINGLE IRRIGATION

## (undated) DEVICE — PROGRASP FORCEPS: Brand: ENDOWRIST;DAVINCI SI

## (undated) DEVICE — SUT STRATAFIX SPIRAL 2-0 CT-1 30 CM SXPP1B410

## (undated) DEVICE — VIAL DECANTER

## (undated) DEVICE — VISUALIZATION SYSTEM: Brand: CLEARIFY

## (undated) DEVICE — 3M™ IOBAN™ 2 ANTIMICROBIAL INCISE DRAPE 6650EZ: Brand: IOBAN™ 2

## (undated) DEVICE — SURGICEL 4 X 8

## (undated) DEVICE — ADHESIVE SKIN HIGH VISCOSITY EXOFIN MICRO 0.5ML

## (undated) DEVICE — SUT MONOCRYL 4-0 PS-2 27 IN Y426H

## (undated) DEVICE — GLOVE SRG BIOGEL 7.5

## (undated) DEVICE — LARGE NEEDLE DRIVER: Brand: ENDOWRIST

## (undated) DEVICE — SYRINGE 50ML LL

## (undated) DEVICE — STAPLER 60: Brand: SUREFORM

## (undated) DEVICE — INTENDED FOR TISSUE SEPARATION, AND OTHER PROCEDURES THAT REQUIRE A SHARP SURGICAL BLADE TO PUNCTURE OR CUT.: Brand: BARD-PARKER SAFETY BLADES SIZE 15, STERILE

## (undated) DEVICE — SUT STRATAFIX SPIRAL PDS PLUS 4-0 SH-1 9IN SXPP1B454

## (undated) DEVICE — MONOPOLAR CURVED SCISSORS: Brand: ENDOWRIST

## (undated) DEVICE — POOLE SUCTION HANDLE: Brand: CARDINAL HEALTH

## (undated) DEVICE — ASTOUND STANDARD SURGICAL GOWN, XL: Brand: CONVERTORS

## (undated) DEVICE — BLUE HEAT SCOPE WARMER

## (undated) DEVICE — ADHESIVE SKIN HIGH VISCOSITY EXOFIN 1ML

## (undated) DEVICE — LAPAROSCOPIC ACCESS SYSTEM: Brand: ALEXIS LAPAROSCOPIC SYSTEM WITH KII FIOS FIRST ENTRY

## (undated) DEVICE — SUT VICRYL 0 UR-6 27 IN J603H

## (undated) DEVICE — ARM DRAPE

## (undated) DEVICE — TROCAR PORT ACCESS 8 X100MML W BLDLS OPTICAL TIP AIRSEAL

## (undated) DEVICE — LAPAROSCOPIC TROCAR SLEEVE/SINGLE USE: Brand: KII® OPTICAL ACCESS SYSTEM

## (undated) DEVICE — GLOVE INDICATOR PI UNDERGLOVE SZ 8 BLUE

## (undated) DEVICE — DRAPE SURGIKIT SADDLE BAG LAP

## (undated) DEVICE — DRAPE EQUIPMENT RF WAND

## (undated) DEVICE — CADIERE FORCEPS: Brand: ENDOWRIST

## (undated) DEVICE — CHLORAPREP HI-LITE 26ML ORANGE

## (undated) DEVICE — REDUCER: Brand: ENDOWRIST

## (undated) DEVICE — GLOVE PI ULTRA TOUCH SZ 6

## (undated) DEVICE — MEGA NEEDLE DRIVER: Brand: ENDOWRIST;DAVINCI SI

## (undated) DEVICE — CANNULA SEAL

## (undated) DEVICE — LUBRICANT INST ELECTROLUBE ANTISTK WO PAD

## (undated) DEVICE — BASIC SINGLE BASIN 2-LF: Brand: MEDLINE INDUSTRIES, INC.

## (undated) DEVICE — TUBING SUCTION 5MM X 12 FT

## (undated) DEVICE — SUT VLOC 90 3-0 CV-23 9 IN VLOCM0844

## (undated) DEVICE — GLOVE INDICATOR PI UNDERGLOVE SZ 6.5 BLUE